# Patient Record
Sex: MALE | Race: WHITE | NOT HISPANIC OR LATINO | Employment: FULL TIME | URBAN - METROPOLITAN AREA
[De-identification: names, ages, dates, MRNs, and addresses within clinical notes are randomized per-mention and may not be internally consistent; named-entity substitution may affect disease eponyms.]

---

## 2019-05-19 ENCOUNTER — HISTORICAL (OUTPATIENT)
Dept: LAB | Facility: HOSPITAL | Age: 29
End: 2019-05-19

## 2023-01-26 ENCOUNTER — HOSPITAL ENCOUNTER (OUTPATIENT)
Dept: RADIOLOGY | Facility: HOSPITAL | Age: 33
Discharge: HOME | End: 2023-01-26
Attending: NURSE PRACTITIONER
Payer: COMMERCIAL

## 2023-01-26 ENCOUNTER — OFFICE VISIT (OUTPATIENT)
Dept: URGENT CARE | Facility: CLINIC | Age: 33
End: 2023-01-26
Payer: COMMERCIAL

## 2023-01-26 VITALS
WEIGHT: 204 LBS | HEART RATE: 74 BPM | TEMPERATURE: 98 F | DIASTOLIC BLOOD PRESSURE: 74 MMHG | SYSTOLIC BLOOD PRESSURE: 145 MMHG | OXYGEN SATURATION: 95 % | HEIGHT: 70 IN | BODY MASS INDEX: 29.2 KG/M2 | RESPIRATION RATE: 18 BRPM

## 2023-01-26 DIAGNOSIS — R20.2 PARESTHESIA OF BOTH HANDS: Primary | ICD-10-CM

## 2023-01-26 PROBLEM — F11.10 OPIOID ABUSE: Status: ACTIVE | Noted: 2023-01-26

## 2023-01-26 PROCEDURE — 99214 PR OFFICE/OUTPT VISIT, EST, LEVL IV, 30-39 MIN: ICD-10-PCS | Mod: S$PBB,,, | Performed by: NURSE PRACTITIONER

## 2023-01-26 PROCEDURE — 72040 X-RAY EXAM NECK SPINE 2-3 VW: CPT | Mod: TC

## 2023-01-26 PROCEDURE — 72070 X-RAY EXAM THORAC SPINE 2VWS: CPT | Mod: TC

## 2023-01-26 PROCEDURE — 99214 OFFICE O/P EST MOD 30 MIN: CPT | Mod: S$PBB,,, | Performed by: NURSE PRACTITIONER

## 2023-01-26 PROCEDURE — 99215 OFFICE O/P EST HI 40 MIN: CPT | Mod: PBBFAC | Performed by: NURSE PRACTITIONER

## 2023-01-26 RX ORDER — METHOCARBAMOL 500 MG/1
1000 TABLET, FILM COATED ORAL 4 TIMES DAILY
Qty: 56 TABLET | Refills: 0 | Status: SHIPPED | OUTPATIENT
Start: 2023-01-26 | End: 2023-02-02

## 2023-01-26 RX ORDER — METHYLPREDNISOLONE 4 MG/1
TABLET ORAL
Qty: 21 EACH | Refills: 0 | Status: SHIPPED | OUTPATIENT
Start: 2023-01-27 | End: 2023-02-16

## 2023-01-26 RX ORDER — DICLOFENAC SODIUM 75 MG/1
75 TABLET, DELAYED RELEASE ORAL 2 TIMES DAILY
Qty: 28 TABLET | Refills: 0 | Status: SHIPPED | OUTPATIENT
Start: 2023-01-26 | End: 2023-02-09

## 2023-01-26 NOTE — PATIENT INSTRUCTIONS
You have been referred to Dr. Palacio at [facility].  He is a hand and wrist specialist.  His office staff will call to give you an appointment, please answer any 337 area code numbers that you don't know in the next week.     - You got a steroid shot in the clinic today. This helps with inflammation and swelling.  - Please see a chiropractor: I recommend Wolf Torrez at [facility] in [place].   He accepts [facility] insurance.   Please have your XRAY results ready to show him when you go for an appointment.  - Wear your cock-up splints every night when you sleep  - Take diclofenac every day for the next 14 days, WITH food, never on an empty stomach, whether you have pain or not.    -Please use the prescriptions that were sent for you.   For the next 14 days do not take:  Ibuprofen  Naproxen  Advil  Aleve  Motrin  Ketorolac   Diclofenac  Meloxicam    It is okay to take Tylenol.  - I also sent you a non-habit forming muscle relaxer to try at night. 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     none

## 2023-01-26 NOTE — PROGRESS NOTES
"Subjective:       Patient ID: Elsie Ahuja is a 32 y.o. male.    Vitals:  height is 5' 10" (1.778 m) and weight is 92.5 kg (204 lb). His oral temperature is 98.2 °F (36.8 °C). His blood pressure is 145/74 (abnormal) and his pulse is 74. His respiration is 18 and oxygen saturation is 95%.     Chief Complaint: Numbness (Patient presents to [facility] with c/o numbness, tingling, unable to hold (fork, spoon) this morning. Patient states symptoms are on going for two weeks. )    HPI as stated in CC. Works offshore as /. Repetitive motion. No specific neck/back injury. Left rhomboid pain. Lifts weights. Inner upper left arm pain. Hands tingling, pins/needles, numb, worse at night.   ROS    Objective:      Physical Exam   Constitutional: He is oriented to person, place, and time.  Non-toxic appearance. He does not appear ill. No distress. normal  Cardiovascular:   Pulses:       Radial pulses are 2+ on the right side and 2+ on the left side.      Comments: Ulnar pulses palpable 1+ bilaterally   Pulmonary/Chest: Effort normal.   Musculoskeletal:         General: Tenderness present. No swelling, deformity or signs of injury.      Cervical back: He exhibits no tenderness.      Right hand: He exhibits decreased range of motion. Decreased strength noted. Motor /Testing: Wrist Extension: 4/5. Wrist Flexion: 5/5. : 4/5. Atrophy: There is no right thenar atrophy. There is no right hypothenar atrophy. Testing: Phalen's sign at right wrist. Tinel's sign at right wrist.      Left hand: He exhibits decreased range of motion. Decreased strength noted. Motor /Testing: Wrist Extension: 4/5. Wrist Flexion: 5/5. : 3/5. Atrophy: There is no left thenar atrophy. There is no left hypothenar atrophy. Testing: Phalen's sign at left wrist. no Tinel's sign at left wrist.   Neurological: He is alert and oriented to person, place, and time. He displays weakness and tremor. A sensory deficit is present. GCS eye subscore " is 4. GCS verbal subscore is 5. GCS motor subscore is 6.   Skin: Skin is warm and dry. Capillary refill takes 2 to 3 seconds.   Psychiatric: Mood, judgment and thought content normal.       Assessment:       1. Paresthesia of both hands        XR THORACIC SPINE AP LATERAL    Result Date: 1/26/2023  EXAMINATION: XR THORACIC SPINE AP LATERAL   CLINICAL HISTORY: Paresthesia of skin pain/numbness of bilateral hands;   COMPARISON: None.   FINDINGS: There is a slight S shaped curvature of the thoracic spine.  The vertebral heights and disc spaces are maintained.  The soft tissues are unremarkable.     1. No acute abnormality identified.   2. Slight S shaped curvature of the thoracic spine.   Electronically signed by: oRmy Moran   Date:    01/26/2023   Time:    10:50    XR Cervical Spine 2 or 3 Views    Result Date: 1/26/2023  EXAMINATION: XR CERVICAL SPINE 2 OR 3 VIEWS   CLINICAL HISTORY: Paresthesia of skin pain/numbness of bilateral hands; COMPARISON: None.   FINDINGS: Cervical alignment is normal.  The vertebral body heights and disc spaces are maintained.  The soft tissues are unremarkable.     No acute abnormality identified.   Electronically signed by: Romy Moran   Date:    01/26/2023   Time:    10:49     Plan:         Paresthesia of both hands  -     Ambulatory referral/consult to Orthopedics  -     XR Cervical Spine 2 or 3 Views  -     XR THORACIC SPINE AP LATERAL    Other orders  -     methylPREDNISolone sod suc(PF) injection 125 mg  -     methylPREDNISolone (MEDROL DOSEPACK) 4 mg tablet; use as directed  Dispense: 21 each; Refill: 0  -     diclofenac (VOLTAREN) 75 MG EC tablet; Take 1 tablet (75 mg total) by mouth 2 (two) times daily. With food. Do not combine with ibuprofen, naproxen or other NSAIDs. for 14 days  Dispense: 28 tablet; Refill: 0  -     methocarbamoL (ROBAXIN) 500 MG Tab; Take 2 tablets (1,000 mg total) by mouth 4 (four) times daily. May cause drowsiness or muscle uncoordination, do  not take before driving, working, drinking alcohol. for 7 days  Dispense: 56 tablet; Refill: 0       You have been referred to Dr. Palacio at [facility].  He is a hand and wrist specialist.  His office staff will call to give you an appointment, please answer any 337 area code numbers that you don't know in the next week.     - You got a steroid shot in the clinic today. This helps with inflammation and swelling.  - Please see a chiropractor: I recommend Wolf Torrez at [facility] in [place].   He accepts [employer] insurance.   Please have your XRAY results ready to show him when you go for an appointment.  - Wear your cock-up splints every night when you sleep  - Take diclofenac every day for the next 14 days, WITH food, never on an empty stomach, whether you have pain or not.    -Please use the prescriptions that were sent for you.   For the next 14 days do not take:  Ibuprofen  Naproxen  Advil  Aleve  Motrin  Ketorolac   Diclofenac  Meloxicam    It is okay to take Tylenol.  - I also sent you a non-habit forming muscle relaxer to try at night.

## 2023-03-06 ENCOUNTER — LAB VISIT (OUTPATIENT)
Dept: LAB | Facility: HOSPITAL | Age: 33
End: 2023-03-06
Attending: FAMILY MEDICINE
Payer: COMMERCIAL

## 2023-03-06 DIAGNOSIS — Z13.21 SCREENING FOR MALNUTRITION: ICD-10-CM

## 2023-03-06 DIAGNOSIS — E34.8 OTHER SPECIFIED ENDOCRINE DISORDERS: ICD-10-CM

## 2023-03-06 DIAGNOSIS — E55.9 AVITAMINOSIS D: ICD-10-CM

## 2023-03-06 DIAGNOSIS — I51.9 MYXEDEMA HEART DISEASE: ICD-10-CM

## 2023-03-06 DIAGNOSIS — E03.9 MYXEDEMA HEART DISEASE: ICD-10-CM

## 2023-03-06 DIAGNOSIS — R68.89 MECHANICAL AND MOTOR PROBLEMS WITH INTERNAL ORGANS: ICD-10-CM

## 2023-03-06 DIAGNOSIS — E29.1 3-OXO-5 ALPHA-STEROID DELTA 4-DEHYDROGENASE DEFICIENCY: Primary | ICD-10-CM

## 2023-03-06 LAB
ALBUMIN SERPL-MCNC: 4.1 G/DL (ref 3.5–5)
ALBUMIN/GLOB SERPL: 1.3 RATIO (ref 1.1–2)
ALP SERPL-CCNC: 83 UNIT/L (ref 40–150)
ALT SERPL-CCNC: 79 UNIT/L (ref 0–55)
AST SERPL-CCNC: 53 UNIT/L (ref 5–34)
BASOPHILS # BLD AUTO: 0.04 X10(3)/MCL (ref 0–0.2)
BASOPHILS NFR BLD AUTO: 1 %
BILIRUBIN DIRECT+TOT PNL SERPL-MCNC: 0.7 MG/DL
BUN SERPL-MCNC: 18.2 MG/DL (ref 8.9–20.6)
CALCIUM SERPL-MCNC: 9.7 MG/DL (ref 8.4–10.2)
CHLORIDE SERPL-SCNC: 105 MMOL/L (ref 98–107)
CO2 SERPL-SCNC: 26 MMOL/L (ref 22–29)
CREAT SERPL-MCNC: 1.11 MG/DL (ref 0.73–1.18)
DEPRECATED CALCIDIOL+CALCIFEROL SERPL-MC: 34.6 NG/ML (ref 30–80)
EOSINOPHIL # BLD AUTO: 0.14 X10(3)/MCL (ref 0–0.9)
EOSINOPHIL NFR BLD AUTO: 3.3 %
ERYTHROCYTE [DISTWIDTH] IN BLOOD BY AUTOMATED COUNT: 13.1 % (ref 11.5–17)
ESTRADIOL SERPL HS-MCNC: <24 PG/ML
FSH SERPL-ACNC: 1.27 MIU/ML
FT4I SERPL CALC-MCNC: 2.65 (ref 2.6–3.6)
GFR SERPLBLD CREATININE-BSD FMLA CKD-EPI: >60 MLS/MIN/1.73/M2
GLOBULIN SER-MCNC: 3.2 GM/DL (ref 2.4–3.5)
GLUCOSE SERPL-MCNC: 93 MG/DL (ref 74–100)
HCT VFR BLD AUTO: 48.8 % (ref 42–52)
HGB BLD-MCNC: 15.9 G/DL (ref 14–18)
IMM GRANULOCYTES # BLD AUTO: 0.04 X10(3)/MCL (ref 0–0.04)
IMM GRANULOCYTES NFR BLD AUTO: 1 %
LH SERPL-ACNC: 0.71 MIU/ML
LYMPHOCYTES # BLD AUTO: 1.34 X10(3)/MCL (ref 0.6–4.6)
LYMPHOCYTES NFR BLD AUTO: 31.9 %
MCH RBC QN AUTO: 29.4 PG
MCHC RBC AUTO-ENTMCNC: 32.6 G/DL (ref 33–36)
MCV RBC AUTO: 90.4 FL (ref 80–94)
MONOCYTES # BLD AUTO: 0.42 X10(3)/MCL (ref 0.1–1.3)
MONOCYTES NFR BLD AUTO: 10 %
NEUTROPHILS # BLD AUTO: 2.22 X10(3)/MCL (ref 2.1–9.2)
NEUTROPHILS NFR BLD AUTO: 52.8 %
NRBC BLD AUTO-RTO: 0 %
PLATELET # BLD AUTO: 234 X10(3)/MCL (ref 130–400)
PMV BLD AUTO: 10.2 FL (ref 7.4–10.4)
POTASSIUM SERPL-SCNC: 4 MMOL/L (ref 3.5–5.1)
PROLACTIN LEVEL (OHS): <0.82 NG/ML (ref 3.46–19.4)
PROT SERPL-MCNC: 7.3 GM/DL (ref 6.4–8.3)
RBC # BLD AUTO: 5.4 X10(6)/MCL (ref 4.7–6.1)
SODIUM SERPL-SCNC: 141 MMOL/L (ref 136–145)
T3FREE SERPL-MCNC: 3.65 PG/ML (ref 1.57–3.91)
T3RU NFR SERPL: 34.44 % (ref 31–39)
T3RU NFR SERPL: 34.44 % (ref 31–39)
T4 SERPL-MCNC: 7.69 UG/DL (ref 4.87–11.72)
T4 SERPL-MCNC: 7.69 UG/DL (ref 4.87–11.72)
TSH SERPL-ACNC: 1.35 UIU/ML (ref 0.35–4.94)
WBC # SPEC AUTO: 4.2 X10(3)/MCL (ref 4.5–11.5)

## 2023-03-06 PROCEDURE — 84402 ASSAY OF FREE TESTOSTERONE: CPT

## 2023-03-06 PROCEDURE — 83001 ASSAY OF GONADOTROPIN (FSH): CPT

## 2023-03-06 PROCEDURE — 82670 ASSAY OF TOTAL ESTRADIOL: CPT

## 2023-03-06 PROCEDURE — 83002 ASSAY OF GONADOTROPIN (LH): CPT

## 2023-03-06 PROCEDURE — 84436 ASSAY OF TOTAL THYROXINE: CPT

## 2023-03-06 PROCEDURE — 84305 ASSAY OF SOMATOMEDIN: CPT | Mod: 90

## 2023-03-06 PROCEDURE — 82306 VITAMIN D 25 HYDROXY: CPT

## 2023-03-06 PROCEDURE — 80053 COMPREHEN METABOLIC PANEL: CPT

## 2023-03-06 PROCEDURE — 84479 ASSAY OF THYROID (T3 OR T4): CPT

## 2023-03-06 PROCEDURE — 36415 COLL VENOUS BLD VENIPUNCTURE: CPT

## 2023-03-06 PROCEDURE — 84481 FREE ASSAY (FT-3): CPT

## 2023-03-06 PROCEDURE — 85025 COMPLETE CBC W/AUTO DIFF WBC: CPT

## 2023-03-06 PROCEDURE — 84443 ASSAY THYROID STIM HORMONE: CPT

## 2023-03-06 PROCEDURE — 84146 ASSAY OF PROLACTIN: CPT

## 2023-03-07 ENCOUNTER — OFFICE VISIT (OUTPATIENT)
Dept: FAMILY MEDICINE | Facility: CLINIC | Age: 33
End: 2023-03-07
Payer: COMMERCIAL

## 2023-03-07 VITALS
DIASTOLIC BLOOD PRESSURE: 82 MMHG | WEIGHT: 203.81 LBS | SYSTOLIC BLOOD PRESSURE: 126 MMHG | OXYGEN SATURATION: 98 % | RESPIRATION RATE: 16 BRPM | HEART RATE: 70 BPM | HEIGHT: 70 IN | BODY MASS INDEX: 29.18 KG/M2 | TEMPERATURE: 99 F

## 2023-03-07 DIAGNOSIS — B35.1 ONYCHOMYCOSIS: ICD-10-CM

## 2023-03-07 DIAGNOSIS — F90.9 ATTENTION DEFICIT HYPERACTIVITY DISORDER (ADHD), UNSPECIFIED ADHD TYPE: ICD-10-CM

## 2023-03-07 DIAGNOSIS — Z83.3 FAMILY HISTORY OF DIABETES MELLITUS: ICD-10-CM

## 2023-03-07 DIAGNOSIS — Z00.00 WELLNESS EXAMINATION: Primary | ICD-10-CM

## 2023-03-07 DIAGNOSIS — K21.9 GASTROESOPHAGEAL REFLUX DISEASE, UNSPECIFIED WHETHER ESOPHAGITIS PRESENT: ICD-10-CM

## 2023-03-07 DIAGNOSIS — F41.9 ANXIETY: ICD-10-CM

## 2023-03-07 DIAGNOSIS — F17.210 CIGARETTE SMOKER: ICD-10-CM

## 2023-03-07 PROCEDURE — 1159F MED LIST DOCD IN RCRD: CPT | Mod: CPTII,,, | Performed by: FAMILY MEDICINE

## 2023-03-07 PROCEDURE — 3079F DIAST BP 80-89 MM HG: CPT | Mod: CPTII,,, | Performed by: FAMILY MEDICINE

## 2023-03-07 PROCEDURE — 3008F BODY MASS INDEX DOCD: CPT | Mod: CPTII,,, | Performed by: FAMILY MEDICINE

## 2023-03-07 PROCEDURE — 99395 PR PREVENTIVE VISIT,EST,18-39: ICD-10-PCS | Mod: ,,, | Performed by: FAMILY MEDICINE

## 2023-03-07 PROCEDURE — 1160F PR REVIEW ALL MEDS BY PRESCRIBER/CLIN PHARMACIST DOCUMENTED: ICD-10-PCS | Mod: CPTII,,, | Performed by: FAMILY MEDICINE

## 2023-03-07 PROCEDURE — 3074F SYST BP LT 130 MM HG: CPT | Mod: CPTII,,, | Performed by: FAMILY MEDICINE

## 2023-03-07 PROCEDURE — 3074F PR MOST RECENT SYSTOLIC BLOOD PRESSURE < 130 MM HG: ICD-10-PCS | Mod: CPTII,,, | Performed by: FAMILY MEDICINE

## 2023-03-07 PROCEDURE — 99395 PREV VISIT EST AGE 18-39: CPT | Mod: ,,, | Performed by: FAMILY MEDICINE

## 2023-03-07 PROCEDURE — 1160F RVW MEDS BY RX/DR IN RCRD: CPT | Mod: CPTII,,, | Performed by: FAMILY MEDICINE

## 2023-03-07 PROCEDURE — 3008F PR BODY MASS INDEX (BMI) DOCUMENTED: ICD-10-PCS | Mod: CPTII,,, | Performed by: FAMILY MEDICINE

## 2023-03-07 PROCEDURE — 3079F PR MOST RECENT DIASTOLIC BLOOD PRESSURE 80-89 MM HG: ICD-10-PCS | Mod: CPTII,,, | Performed by: FAMILY MEDICINE

## 2023-03-07 PROCEDURE — 1159F PR MEDICATION LIST DOCUMENTED IN MEDICAL RECORD: ICD-10-PCS | Mod: CPTII,,, | Performed by: FAMILY MEDICINE

## 2023-03-07 RX ORDER — TERBINAFINE HYDROCHLORIDE 250 MG/1
250 TABLET ORAL DAILY
Qty: 42 TABLET | Refills: 1 | Status: SHIPPED | OUTPATIENT
Start: 2023-03-07 | End: 2023-03-08

## 2023-03-07 RX ORDER — CABERGOLINE 0.5 MG/1
0.5 TABLET ORAL
COMMUNITY
Start: 2023-03-03

## 2023-03-07 RX ORDER — TADALAFIL 20 MG/1
20 TABLET ORAL DAILY PRN
COMMUNITY
Start: 2023-03-03

## 2023-03-07 RX ORDER — LISDEXAMFETAMINE DIMESYLATE 40 MG/1
40 CAPSULE ORAL EVERY MORNING
COMMUNITY
Start: 2023-01-30

## 2023-03-07 RX ORDER — ANASTROZOLE 1 MG/1
1 TABLET ORAL
COMMUNITY
Start: 2023-03-03

## 2023-03-07 RX ORDER — BUSPIRONE HYDROCHLORIDE 5 MG/1
TABLET ORAL
COMMUNITY
Start: 2023-02-28

## 2023-03-07 RX ORDER — PANTOPRAZOLE SODIUM 40 MG/1
40 TABLET, DELAYED RELEASE ORAL DAILY
Qty: 30 TABLET | Refills: 11 | Status: SHIPPED | OUTPATIENT
Start: 2023-03-07 | End: 2024-03-06

## 2023-03-07 RX ORDER — ALBUTEROL SULFATE 90 UG/1
AEROSOL, METERED RESPIRATORY (INHALATION)
COMMUNITY
Start: 2022-10-23 | End: 2023-03-07

## 2023-03-07 RX ORDER — TAMOXIFEN CITRATE 20 MG/1
20 TABLET ORAL
COMMUNITY
Start: 2023-03-03

## 2023-03-07 RX ORDER — AMOXICILLIN 875 MG/1
TABLET, FILM COATED ORAL
COMMUNITY
Start: 2023-03-02 | End: 2023-07-10

## 2023-03-07 NOTE — PROGRESS NOTES
"Subjective:        Patient ID: Elsie Ahuja is a 32 y.o. male.    Chief Complaint: Follow-up (No PCP /Sees Dr. Momin for hormones/And Dr. Gay for psych /Sees Dr. Arita for carpal tunnel )      Patient presents to clinic unaccompanied to establish care.  He would like to do a wellness visit today    C/o gerd.  Has tried prilosec and zegerid.     Having some dental work.  On amoxicillin preventatively.  Has one day left.    He has anxiety and adhd.  Is on buspar and vyvanse.  Follows with psych, dr. Christy gay.  Sees her q3 months.      He follows with dr. Momin for hormones.      He has carpal tunnel bilaterally and sees dr. Palacio. Has appt with him 3/13/23.  Wearing wrist splints at night.     He is not allergic to any medications. He smokes cigarettes.      Has family history of diabetes in both parents. No family history of colon cancer.     Review of Systems   Constitutional: Negative.    HENT: Negative.     Eyes: Negative.    Respiratory: Negative.     Cardiovascular: Negative.    Gastrointestinal:         Gerd   Endocrine: Negative.    Genitourinary: Negative.    Musculoskeletal: Negative.    Skin:         Toenail fungus   Allergic/Immunologic: Negative.    Neurological: Negative.    Hematological: Negative.    Psychiatric/Behavioral: Negative.     All other systems reviewed and are negative.      Review of patient's allergies indicates:  No Known Allergies   Vitals:    03/07/23 1504   BP: 126/82   BP Location: Left arm   Pulse: 70   Resp: 16   Temp: 99.3 °F (37.4 °C)   TempSrc: Temporal   SpO2: 98%   Weight: 92.4 kg (203 lb 12.8 oz)   Height: 5' 10" (1.778 m)      Social History     Socioeconomic History    Marital status:    Tobacco Use    Smoking status: Some Days     Packs/day: 0.00     Types: Cigarettes    Smokeless tobacco: Never    Tobacco comments:     Smokes socially. Less than 1 pack in 6 months    Substance and Sexual Activity    Alcohol use: Not Currently    Drug use: Never    " Sexual activity: Yes     Partners: Female     Birth control/protection: I.U.D.      Family History   Problem Relation Age of Onset    Arthritis Mother     Depression Mother     Hyperlipidemia Mother     Hypertension Mother     Arthritis Father     Diabetes Father     Hearing loss Father     Hyperlipidemia Father     Hypertension Father           Objective:     Physical Exam  Vitals and nursing note reviewed.   Constitutional:       Appearance: Normal appearance. He is normal weight.   HENT:      Head: Normocephalic.      Nose: Nose normal.      Mouth/Throat:      Mouth: Mucous membranes are moist.      Pharynx: Oropharynx is clear.   Eyes:      Extraocular Movements: Extraocular movements intact.   Cardiovascular:      Rate and Rhythm: Normal rate and regular rhythm.   Pulmonary:      Effort: Pulmonary effort is normal.      Breath sounds: Normal breath sounds.   Musculoskeletal:         General: Normal range of motion.        Feet:    Feet:      Left foot:      Toenail Condition: Fungal disease present.     Comments: Left great toenail with fungal disease  Skin:     General: Skin is warm and dry.   Neurological:      General: No focal deficit present.      Mental Status: He is alert and oriented to person, place, and time. Mental status is at baseline.   Psychiatric:         Mood and Affect: Mood normal.     Current Outpatient Medications on File Prior to Visit   Medication Sig Dispense Refill    amoxicillin (AMOXIL) 875 MG tablet       anastrozole (ARIMIDEX) 1 mg Tab Take 1 mg by mouth 3 (three) times a week.      busPIRone (BUSPAR) 5 MG Tab take one tablet by mouth two times daily for anxiety      cabergoline (DOSTINEX) 0.5 mg tablet Take 0.5 mg by mouth every 7 days.      tadalafiL (CIALIS) 20 MG Tab Take 20 mg by mouth daily as needed.      tamoxifen (NOLVADEX) 20 MG Tab Take 20 mg by mouth.      VYVANSE 40 mg Cap Take 40 mg by mouth every morning.      [DISCONTINUED] albuterol (PROVENTIL/VENTOLIN HFA) 90  mcg/actuation inhaler        No current facility-administered medications on file prior to visit.     Health Maintenance   Topic Date Due    Hepatitis C Screening  Never done    Lipid Panel  Never done    TETANUS VACCINE  02/02/2033      Results for orders placed or performed in visit on 03/06/23   Comprehensive Metabolic Panel   Result Value Ref Range    Sodium Level 141 136 - 145 mmol/L    Potassium Level 4.0 3.5 - 5.1 mmol/L    Chloride 105 98 - 107 mmol/L    Carbon Dioxide 26 22 - 29 mmol/L    Glucose Level 93 74 - 100 mg/dL    Blood Urea Nitrogen 18.2 8.9 - 20.6 mg/dL    Creatinine 1.11 0.73 - 1.18 mg/dL    Calcium Level Total 9.7 8.4 - 10.2 mg/dL    Protein Total 7.3 6.4 - 8.3 gm/dL    Albumin Level 4.1 3.5 - 5.0 g/dL    Globulin 3.2 2.4 - 3.5 gm/dL    Albumin/Globulin Ratio 1.3 1.1 - 2.0 ratio    Bilirubin Total 0.7 <=1.5 mg/dL    Alkaline Phosphatase 83 40 - 150 unit/L    Alanine Aminotransferase 79 (H) 0 - 55 unit/L    Aspartate Aminotransferase 53 (H) 5 - 34 unit/L    eGFR >60 mls/min/1.73/m2   Estradiol   Result Value Ref Range    Estradiol Level <24 pg/mL   Follicle Stimulating Hormone   Result Value Ref Range    Follicle Stimulating Hormone 1.27 mIU/mL   Prolactin   Result Value Ref Range    Prolactin Level <0.82 (L) 3.46 - 19.40 ng/mL   Vitamin D   Result Value Ref Range    Vit D 25 OH 34.6 30.0 - 80.0 ng/mL   T3, Free (OLG)   Result Value Ref Range    T3 Free 3.65 1.57 - 3.91 pg/mL   Luteinizing Hormone   Result Value Ref Range    Luteinizing Hormone 0.71 mIU/mL   T3, Uptake   Result Value Ref Range    T3 Uptake 34.44 31.00 - 39.00 %   T4 (In-House)   Result Value Ref Range    Thyroxine 7.69 4.87 - 11.72 ug/dL   TSH   Result Value Ref Range    Thyroid Stimulating Hormone 1.348 0.350 - 4.940 uIU/mL   CBC with Differential   Result Value Ref Range    WBC 4.2 (L) 4.5 - 11.5 x10(3)/mcL    RBC 5.40 4.70 - 6.10 x10(6)/mcL    Hgb 15.9 14.0 - 18.0 g/dL    Hct 48.8 42.0 - 52.0 %    MCV 90.4 80.0 - 94.0 fL     MCH 29.4 pg    MCHC 32.6 (L) 33.0 - 36.0 g/dL    RDW 13.1 11.5 - 17.0 %    Platelet 234 130 - 400 x10(3)/mcL    MPV 10.2 7.4 - 10.4 fL    Neut % 52.8 %    Lymph % 31.9 %    Mono % 10.0 %    Eos % 3.3 %    Basophil % 1.0 %    Lymph # 1.34 0.6 - 4.6 x10(3)/mcL    Neut # 2.22 2.1 - 9.2 x10(3)/mcL    Mono # 0.42 0.1 - 1.3 x10(3)/mcL    Eos # 0.14 0 - 0.9 x10(3)/mcL    Baso # 0.04 0 - 0.2 x10(3)/mcL    IG# 0.04 0 - 0.04 x10(3)/mcL    IG% 1.0 %    NRBC% 0.0 %   T7   Result Value Ref Range    T3 Uptake 34.4400 31.00 - 39.00 %    Thyroxine 7.69 4.87 - 11.72 ug/dL    Free Thyroxine Index 2.65 2.60 - 3.60          Assessment & Plan:     Active Problem List with Overview Notes    Diagnosis Date Noted    Cigarette smoker 03/07/2023    Wellness examination 03/07/2023    Family history of diabetes mellitus 03/07/2023    Onychomycosis 03/07/2023    Anxiety     ADHD (attention deficit hyperactivity disorder)     Opioid abuse 01/26/2023       1. Wellness examination  Assessment & Plan:  Labs today. Will call with results when available  Denies family history of colon cancer  Declines immunizations today    Orders:  -     Cancel: CBC Auto Differential; Future; Expected date: 03/07/2024  -     Cancel: Comprehensive Metabolic Panel; Future; Expected date: 03/07/2024  -     Cancel: Lipid Panel; Future; Expected date: 03/07/2024  -     Cancel: TSH; Future; Expected date: 03/07/2024  -     Cancel: Hepatitis C Antibody; Future; Expected date: 03/07/2024  -     Cancel: HIV 1/2 Ag/Ab (4th Gen); Future; Expected date: 03/07/2024  -     Lipid Panel; Future; Expected date: 03/07/2023  -     Hemoglobin A1C; Future; Expected date: 03/07/2023  -     Hepatitis C Antibody; Future; Expected date: 03/07/2023  -     HIV 1/2 Ag/Ab (4th Gen); Future; Expected date: 03/07/2023    2. Anxiety  Assessment & Plan:  Stable on buspar. Keep appts with psych    Orders:  -     Cancel: CBC Auto Differential; Future; Expected date: 03/07/2024  -     Cancel:  Comprehensive Metabolic Panel; Future; Expected date: 03/07/2024  -     Cancel: Lipid Panel; Future; Expected date: 03/07/2024  -     Cancel: TSH; Future; Expected date: 03/07/2024  -     Cancel: Hepatitis C Antibody; Future; Expected date: 03/07/2024  -     Cancel: HIV 1/2 Ag/Ab (4th Gen); Future; Expected date: 03/07/2024  -     Lipid Panel; Future; Expected date: 03/07/2023  -     Hemoglobin A1C; Future; Expected date: 03/07/2023  -     Hepatitis C Antibody; Future; Expected date: 03/07/2023  -     HIV 1/2 Ag/Ab (4th Gen); Future; Expected date: 03/07/2023    3. Attention deficit hyperactivity disorder (ADHD), unspecified ADHD type  Assessment & Plan:  On vyvanse.  Keep appts with psych    Orders:  -     Cancel: CBC Auto Differential; Future; Expected date: 03/07/2024  -     Cancel: Comprehensive Metabolic Panel; Future; Expected date: 03/07/2024  -     Cancel: Lipid Panel; Future; Expected date: 03/07/2024  -     Cancel: TSH; Future; Expected date: 03/07/2024  -     Cancel: Hepatitis C Antibody; Future; Expected date: 03/07/2024  -     Cancel: HIV 1/2 Ag/Ab (4th Gen); Future; Expected date: 03/07/2024  -     Lipid Panel; Future; Expected date: 03/07/2023  -     Hemoglobin A1C; Future; Expected date: 03/07/2023  -     Hepatitis C Antibody; Future; Expected date: 03/07/2023  -     HIV 1/2 Ag/Ab (4th Gen); Future; Expected date: 03/07/2023    4. Cigarette smoker  Assessment & Plan:  Referral placed    Orders:  -     Ambulatory referral/consult to Smoking Cessation Program; Future; Expected date: 03/14/2023  -     Cancel: CBC Auto Differential; Future; Expected date: 03/07/2024  -     Cancel: Comprehensive Metabolic Panel; Future; Expected date: 03/07/2024  -     Cancel: Lipid Panel; Future; Expected date: 03/07/2024  -     Cancel: TSH; Future; Expected date: 03/07/2024  -     Cancel: Hepatitis C Antibody; Future; Expected date: 03/07/2024  -     Cancel: HIV 1/2 Ag/Ab (4th Gen); Future; Expected date: 03/07/2024  -      Lipid Panel; Future; Expected date: 03/07/2023  -     Hemoglobin A1C; Future; Expected date: 03/07/2023  -     Hepatitis C Antibody; Future; Expected date: 03/07/2023  -     HIV 1/2 Ag/Ab (4th Gen); Future; Expected date: 03/07/2023    5. Family history of diabetes mellitus  Assessment & Plan:  Will add a1c to labs      6. Onychomycosis  Assessment & Plan:  Declines topical soak.  Will send in terbinafine 250mg x 6 weeks.  Then he will need to get labs. Lab orders in. If wnl, will continue for 6 more weeks for total of 12 weeks. Avoid tylenol, alcohol and herbal teas. Patient is agreeable to plan and verbalized understanding    Orders:  -     Comprehensive Metabolic Panel; Future; Expected date: 04/18/2023    7. Gastroesophageal reflux disease, unspecified whether esophagitis present  -     pantoprazole (PROTONIX) 40 MG tablet; Take 1 tablet (40 mg total) by mouth once daily.  Dispense: 30 tablet; Refill: 11    Other orders  -     terbinafine HCL (LAMISIL) 250 mg tablet; Take 1 tablet (250 mg total) by mouth once daily. GO DO LABS PRIOR TO REFILL OF MEDS.  Dispense: 42 tablet; Refill: 1         Follow up in about 1 year (around 3/7/2024) for Wellness with Labs.

## 2023-03-07 NOTE — ASSESSMENT & PLAN NOTE
Declines topical soak.  Will send in terbinafine 250mg x 6 weeks.  Then he will need to get labs. Lab orders in. If wnl, will continue for 6 more weeks for total of 12 weeks. Avoid tylenol, alcohol and herbal teas. Patient is agreeable to plan and verbalized understanding

## 2023-03-07 NOTE — ASSESSMENT & PLAN NOTE
Labs today. Will call with results when available  Denies family history of colon cancer  Declines immunizations today

## 2023-03-08 ENCOUNTER — TELEPHONE (OUTPATIENT)
Dept: FAMILY MEDICINE | Facility: CLINIC | Age: 33
End: 2023-03-08
Payer: COMMERCIAL

## 2023-03-08 ENCOUNTER — LAB VISIT (OUTPATIENT)
Dept: LAB | Facility: HOSPITAL | Age: 33
End: 2023-03-08
Attending: FAMILY MEDICINE
Payer: COMMERCIAL

## 2023-03-08 DIAGNOSIS — R76.8 HEPATITIS C ANTIBODY TEST POSITIVE: Primary | ICD-10-CM

## 2023-03-08 DIAGNOSIS — F41.9 ANXIETY: ICD-10-CM

## 2023-03-08 DIAGNOSIS — Z00.00 WELLNESS EXAMINATION: ICD-10-CM

## 2023-03-08 DIAGNOSIS — F90.9 ATTENTION DEFICIT HYPERACTIVITY DISORDER (ADHD), UNSPECIFIED ADHD TYPE: ICD-10-CM

## 2023-03-08 DIAGNOSIS — B35.1 ONYCHOMYCOSIS: ICD-10-CM

## 2023-03-08 DIAGNOSIS — F17.210 CIGARETTE SMOKER: ICD-10-CM

## 2023-03-08 LAB
CHOLEST SERPL-MCNC: 140 MG/DL
CHOLEST/HDLC SERPL: 2 {RATIO} (ref 0–5)
EST. AVERAGE GLUCOSE BLD GHB EST-MCNC: 96.8 MG/DL
HBA1C MFR BLD: 5 %
HCV AB SERPL QL IA: REACTIVE
HDLC SERPL-MCNC: 57 MG/DL (ref 35–60)
HIV 1+2 AB+HIV1 P24 AG SERPL QL IA: NONREACTIVE
LDLC SERPL CALC-MCNC: 68 MG/DL (ref 50–140)
SHBG SERPL-SCNC: 64 NMOL/L
TESTOST FREE MFR SERPL: 1.2 %
TESTOST FREE SERPL-MCNC: 33 PG/ML
TESTOST SERPL-MCNC: 288 NG/DL
TESTOSTERONE.FREE+WB SERPL-MCNC: 97 NG/DL
TRIGL SERPL-MCNC: 75 MG/DL (ref 34–140)
VLDLC SERPL CALC-MCNC: 15 MG/DL

## 2023-03-08 PROCEDURE — 87389 HIV-1 AG W/HIV-1&-2 AB AG IA: CPT

## 2023-03-08 PROCEDURE — 36415 COLL VENOUS BLD VENIPUNCTURE: CPT

## 2023-03-08 PROCEDURE — 83036 HEMOGLOBIN GLYCOSYLATED A1C: CPT

## 2023-03-08 PROCEDURE — 80061 LIPID PANEL: CPT

## 2023-03-08 PROCEDURE — 86803 HEPATITIS C AB TEST: CPT

## 2023-03-08 NOTE — TELEPHONE ENCOUNTER
I spoke with Mariposa at [facility] and they asked if the cmp was to be done now. I advised and educated per Dr. Sosa's note that is was do in 6 weeks. She voiced understanding. Cecy

## 2023-03-08 NOTE — PROGRESS NOTES
Please inform patient of results.    1. His hep c antibody is positive.  Has he been tested for this in the past or treated? If not, we need to place a referral to ID to treat hep c.  I will place a referral to ID.  I would also like him to hold off on taking the terbinafine for his nail fungus. As we discussed, this med is metabolized in the liver and if he may have hep c I do not want to inflame his liver.  I will remove it from his med list.   2. Hiv is negative  3. Cholesterol and a1c are wnl    Other labwork within acceptable ranges.

## 2023-03-08 NOTE — TELEPHONE ENCOUNTER
----- Message from Aram Anand sent at 3/8/2023  8:49 AM CST -----  .Type:  Needs Medical Advice    Who Called: [facility] lab   Symptoms (please be specific):    How long has patient had these symptoms:    Pharmacy name and phone #:    Would the patient rather a call back or a response via [facility]?   Best Call Back Number: [phone]  Additional Information: She needs to speak with nurse re: some orders that she has for this patient.

## 2023-03-09 LAB
IGF-I SERPL-MCNC: 190 NG/ML (ref 54–310)
IGF-I Z-SCORE SERPL: 0.52 SD

## 2023-03-13 ENCOUNTER — HOSPITAL ENCOUNTER (OUTPATIENT)
Dept: RADIOLOGY | Facility: CLINIC | Age: 33
Discharge: HOME | End: 2023-03-13
Attending: STUDENT IN AN ORGANIZED HEALTH CARE EDUCATION/TRAINING PROGRAM
Payer: COMMERCIAL

## 2023-03-13 ENCOUNTER — OFFICE VISIT (OUTPATIENT)
Dept: ORTHOPEDICS | Facility: CLINIC | Age: 33
End: 2023-03-13
Payer: COMMERCIAL

## 2023-03-13 VITALS — HEART RATE: 94 BPM | DIASTOLIC BLOOD PRESSURE: 82 MMHG | SYSTOLIC BLOOD PRESSURE: 149 MMHG

## 2023-03-13 DIAGNOSIS — G56.23 CUBITAL TUNNEL SYNDROME, BILATERAL: ICD-10-CM

## 2023-03-13 DIAGNOSIS — M79.642 BILATERAL HAND PAIN: ICD-10-CM

## 2023-03-13 DIAGNOSIS — M79.641 BILATERAL HAND PAIN: ICD-10-CM

## 2023-03-13 DIAGNOSIS — G56.03 BILATERAL CARPAL TUNNEL SYNDROME: Primary | ICD-10-CM

## 2023-03-13 PROCEDURE — 1159F MED LIST DOCD IN RCRD: CPT | Mod: CPTII,,, | Performed by: STUDENT IN AN ORGANIZED HEALTH CARE EDUCATION/TRAINING PROGRAM

## 2023-03-13 PROCEDURE — 99203 PR OFFICE/OUTPT VISIT, NEW, LEVL III, 30-44 MIN: ICD-10-PCS | Mod: ,,, | Performed by: STUDENT IN AN ORGANIZED HEALTH CARE EDUCATION/TRAINING PROGRAM

## 2023-03-13 PROCEDURE — 3079F PR MOST RECENT DIASTOLIC BLOOD PRESSURE 80-89 MM HG: ICD-10-PCS | Mod: CPTII,,, | Performed by: STUDENT IN AN ORGANIZED HEALTH CARE EDUCATION/TRAINING PROGRAM

## 2023-03-13 PROCEDURE — 3077F SYST BP >= 140 MM HG: CPT | Mod: CPTII,,, | Performed by: STUDENT IN AN ORGANIZED HEALTH CARE EDUCATION/TRAINING PROGRAM

## 2023-03-13 PROCEDURE — 1159F PR MEDICATION LIST DOCUMENTED IN MEDICAL RECORD: ICD-10-PCS | Mod: CPTII,,, | Performed by: STUDENT IN AN ORGANIZED HEALTH CARE EDUCATION/TRAINING PROGRAM

## 2023-03-13 PROCEDURE — 3079F DIAST BP 80-89 MM HG: CPT | Mod: CPTII,,, | Performed by: STUDENT IN AN ORGANIZED HEALTH CARE EDUCATION/TRAINING PROGRAM

## 2023-03-13 PROCEDURE — 73130 X-RAY EXAM OF HAND: CPT | Mod: RT,,, | Performed by: STUDENT IN AN ORGANIZED HEALTH CARE EDUCATION/TRAINING PROGRAM

## 2023-03-13 PROCEDURE — 73130 XR HAND COMPLETE 3 VIEW RIGHT: ICD-10-PCS | Mod: RT,,, | Performed by: STUDENT IN AN ORGANIZED HEALTH CARE EDUCATION/TRAINING PROGRAM

## 2023-03-13 PROCEDURE — 73130 X-RAY EXAM OF HAND: CPT | Mod: LT,,, | Performed by: STUDENT IN AN ORGANIZED HEALTH CARE EDUCATION/TRAINING PROGRAM

## 2023-03-13 PROCEDURE — 3077F PR MOST RECENT SYSTOLIC BLOOD PRESSURE >= 140 MM HG: ICD-10-PCS | Mod: CPTII,,, | Performed by: STUDENT IN AN ORGANIZED HEALTH CARE EDUCATION/TRAINING PROGRAM

## 2023-03-13 PROCEDURE — 99203 OFFICE O/P NEW LOW 30 MIN: CPT | Mod: ,,, | Performed by: STUDENT IN AN ORGANIZED HEALTH CARE EDUCATION/TRAINING PROGRAM

## 2023-03-13 NOTE — PROGRESS NOTES
Chief Complaint:  Bilateral hand numbness and tingling    Consulting Physician: Lauren Lance FNP    History of present illness:    Patient is a 32-year-old male right-hand dominant who works off shore as an  presents for initial evaluation of bilateral hand numbness and tingling.  He states that he is had left worse than right hand numbness for several years but it has gotten worse over the past few months.  In the past when it 1st started he would affect him only at nighttime but has now progressed to throughout the day.  He is states that his hands are completely numb.  Does not have any neck pain.  Does complain of some mild pain in his left axilla but overall the pain that he feels is at the medial aspect of the elbow and at the wrist.  He states that sometimes if he twists the wrist the wrong way he feels a pop in the wrist with stinging and shocking sensations into fingertips.  He states that the thumb is the most affected finger but all 5 fingers are numb in both hands.  He sleeps with carpal tunnel braces which helps some.  He does not have significant weakness.    Past Medical History:   Diagnosis Date    ADHD (attention deficit hyperactivity disorder)     Anxiety        History reviewed. No pertinent surgical history.    Current Outpatient Medications   Medication Sig    amoxicillin (AMOXIL) 875 MG tablet     anastrozole (ARIMIDEX) 1 mg Tab Take 1 mg by mouth 3 (three) times a week.    busPIRone (BUSPAR) 5 MG Tab take one tablet by mouth two times daily for anxiety    cabergoline (DOSTINEX) 0.5 mg tablet Take 0.5 mg by mouth every 7 days.    pantoprazole (PROTONIX) 40 MG tablet Take 1 tablet (40 mg total) by mouth once daily.    tadalafiL (CIALIS) 20 MG Tab Take 20 mg by mouth daily as needed.    tamoxifen (NOLVADEX) 20 MG Tab Take 20 mg by mouth.    VYVANSE 40 mg Cap Take 40 mg by mouth every morning.     No current facility-administered medications for this visit.       Review of patient's  allergies indicates:  No Known Allergies    Family History   Problem Relation Age of Onset    Arthritis Mother     Depression Mother     Hyperlipidemia Mother     Hypertension Mother     Arthritis Father     Diabetes Father     Hearing loss Father     Hyperlipidemia Father     Hypertension Father        Social History     Socioeconomic History    Marital status:    Tobacco Use    Smoking status: Some Days     Packs/day: 0.00     Types: Cigarettes    Smokeless tobacco: Never    Tobacco comments:     Smokes socially. Less than 1 pack in 6 months    Substance and Sexual Activity    Alcohol use: Not Currently    Drug use: Never    Sexual activity: Yes     Partners: Female     Birth control/protection: I.U.D.       Review of Systems:    Constitution:   Denies chills, fever, and sweats.  HENT:   Denies headaches or blurry vision.  Cardiovascular:  Denies chest pain or irregular heart beat.  Respiratory:   Denies cough or shortness of breath.  Gastrointestinal:  Denies abdominal pain, nausea, or vomiting.  Musculoskeletal:   Denies muscle cramps.  Neurological:   Denies dizziness or focal weakness.  Psychiatric/Behavior: Normal mental status.  Hematology/Lymph:  Denies bleeding problem or easy bruising/bleeding.  Skin:    Denies rash or suspicious lesions.    Examination:    Vital Signs:    Vitals:    03/13/23 0953   BP: (!) 149/82   Pulse: 94   PainSc:   1       There is no height or weight on file to calculate BMI.    Constitution:   Well-developed, well nourished patient in no acute distress.  Neurological:   Alert and oriented x 3 and cooperative to examination.     Psychiatric/Behavior: Normal mental status.  Respiratory:   No shortness of breath.  Eyes:    Extraoccular muscles intact  Skin:    No scars, rash or suspicious lesions.    MSK:   Left upper extremity: No open wounds or rashes.  Full range of motion of the shoulder, elbow, wrist, hand.  Spurling is negative.  Tinel is positive over the ulnar nerve at  the elbow as well as the median nerve at the wrist.  Phalen's and Durkan's tests are positive.  Flexion and compression at the elbow reproduces the numbness in the ring and small finger.  Two-point discrimination is greater than 15 in the thumb index and middle fingers.  Two-point discrimination is 10 in the ring and small fingers.  Radial pulses 2+.  Apb strength is 5/5.  First dorsal interosseous strength is 5/5.  Hand is warm well perfused    Right upper extremity: No open wounds or rashes.  Full range of motion of the shoulder, elbow, wrist, hand.  Spurling is negative.  Tinel is positive over the ulnar nerve at the elbow as well as the median nerve at the wrist.  Phalen's and Durkan's tests are positive.  Flexion and compression at the elbow reproduces the numbness in the ring and small finger.  Two-point discrimination is greater than 15 in the thumb index and middle fingers.  Two-point discrimination is 10 in the ring and small fingers.  Radial pulses 2+.  Apb strength is 5/5.  First dorsal interosseous strength is 5/5.  Hand is warm well perfused    Imaging:   X-ray of the left hand shows no fractures or dislocations  X-ray of the right hand shows no fractures or dislocations     Assessment:  Bilateral carpal tunnel syndrome, bilateral cubital tunnel syndrome    Plan:  I think he has bilateral carpal tunnel and cubital tunnel syndromes.  I will send him for a EMG nerve conduction study to evaluate for bilateral carpal tunnel cubital tunnel as well as cervical radiculopathy.  I do not think he has cervical radiculopathy based on my examination.  I think he would benefit from carpal tunnel and cubital tunnel releases given how diminished sensation in the hand is.  I will see him back after the EMG nerve conduction study    Follow Up:  After EMG  Xray at next visit:  None

## 2023-04-13 ENCOUNTER — TELEPHONE (OUTPATIENT)
Dept: SMOKING CESSATION | Facility: CLINIC | Age: 33
End: 2023-04-13
Payer: COMMERCIAL

## 2023-04-13 NOTE — TELEPHONE ENCOUNTER
Pt had not shown up for his [facility].  Called pt.  No answer.  Unable to leave a voice message.  Mailbox is not setup.

## 2023-05-12 ENCOUNTER — TELEPHONE (OUTPATIENT)
Dept: ORTHOPEDICS | Facility: CLINIC | Age: 33
End: 2023-05-12
Payer: COMMERCIAL

## 2023-05-12 ENCOUNTER — TELEPHONE (OUTPATIENT)
Dept: SMOKING CESSATION | Facility: CLINIC | Age: 33
End: 2023-05-12
Payer: COMMERCIAL

## 2023-05-12 NOTE — TELEPHONE ENCOUNTER
Attempted to contact patient to rescheduled missed [facility] appointment.  Patient.  Patient did not answer.  Counselor was unable to leave a voice message.  Voicemail was not available.

## 2023-05-12 NOTE — TELEPHONE ENCOUNTER
Attempted to call patient to discuss if he did his EMG. His appointment is Monday and I do not have results. Patient did not answer and can't leave .

## 2023-05-23 ENCOUNTER — TELEPHONE (OUTPATIENT)
Dept: ORTHOPEDICS | Facility: CLINIC | Age: 33
End: 2023-05-23
Payer: COMMERCIAL

## 2023-05-23 NOTE — TELEPHONE ENCOUNTER
Attempted to call the patient to schedule an appointment for EMG results. No answer and cannot LVM.

## 2023-06-12 PROBLEM — Z00.00 WELLNESS EXAMINATION: Status: RESOLVED | Noted: 2023-03-07 | Resolved: 2023-06-12

## 2023-07-10 ENCOUNTER — LAB VISIT (OUTPATIENT)
Dept: LAB | Facility: HOSPITAL | Age: 33
End: 2023-07-10
Attending: FAMILY MEDICINE
Payer: COMMERCIAL

## 2023-07-10 ENCOUNTER — PATIENT MESSAGE (OUTPATIENT)
Dept: FAMILY MEDICINE | Facility: CLINIC | Age: 33
End: 2023-07-10

## 2023-07-10 ENCOUNTER — OFFICE VISIT (OUTPATIENT)
Dept: FAMILY MEDICINE | Facility: CLINIC | Age: 33
End: 2023-07-10
Payer: COMMERCIAL

## 2023-07-10 VITALS
OXYGEN SATURATION: 98 % | RESPIRATION RATE: 16 BRPM | HEIGHT: 70 IN | DIASTOLIC BLOOD PRESSURE: 82 MMHG | HEART RATE: 66 BPM | BODY MASS INDEX: 29.66 KG/M2 | WEIGHT: 207.19 LBS | SYSTOLIC BLOOD PRESSURE: 150 MMHG | TEMPERATURE: 99 F

## 2023-07-10 DIAGNOSIS — Z11.3 SCREEN FOR STD (SEXUALLY TRANSMITTED DISEASE): ICD-10-CM

## 2023-07-10 DIAGNOSIS — R03.0 ELEVATED BLOOD PRESSURE READING: ICD-10-CM

## 2023-07-10 DIAGNOSIS — B19.20 HEPATITIS C VIRUS INFECTION WITHOUT HEPATIC COMA, UNSPECIFIED CHRONICITY: Primary | ICD-10-CM

## 2023-07-10 DIAGNOSIS — R35.1 NOCTURIA: ICD-10-CM

## 2023-07-10 DIAGNOSIS — B19.20 HEPATITIS C VIRUS INFECTION WITHOUT HEPATIC COMA, UNSPECIFIED CHRONICITY: ICD-10-CM

## 2023-07-10 DIAGNOSIS — K43.9 HERNIA OF ABDOMINAL WALL: ICD-10-CM

## 2023-07-10 DIAGNOSIS — Z83.3 FAMILY HISTORY OF DIABETES MELLITUS: ICD-10-CM

## 2023-07-10 LAB
APPEARANCE UR: CLEAR
BILIRUB UR QL STRIP.AUTO: NEGATIVE MG/DL
COLOR UR: NORMAL
GLUCOSE UR QL STRIP.AUTO: NEGATIVE MG/DL
KETONES UR QL STRIP.AUTO: NEGATIVE MG/DL
LEUKOCYTE ESTERASE UR QL STRIP.AUTO: NEGATIVE UNIT/L
NITRITE UR QL STRIP.AUTO: NEGATIVE
PH UR STRIP.AUTO: 6 [PH]
PROT UR QL STRIP.AUTO: NEGATIVE MG/DL
RBC UR QL AUTO: NEGATIVE UNIT/L
SP GR UR STRIP.AUTO: 1.01
UROBILINOGEN UR STRIP-ACNC: 0.2 MG/DL

## 2023-07-10 PROCEDURE — 81003 URINALYSIS AUTO W/O SCOPE: CPT

## 2023-07-10 PROCEDURE — 1159F MED LIST DOCD IN RCRD: CPT | Mod: CPTII,,, | Performed by: FAMILY MEDICINE

## 2023-07-10 PROCEDURE — 3077F PR MOST RECENT SYSTOLIC BLOOD PRESSURE >= 140 MM HG: ICD-10-PCS | Mod: CPTII,,, | Performed by: FAMILY MEDICINE

## 2023-07-10 PROCEDURE — 1159F PR MEDICATION LIST DOCUMENTED IN MEDICAL RECORD: ICD-10-PCS | Mod: CPTII,,, | Performed by: FAMILY MEDICINE

## 2023-07-10 PROCEDURE — 3079F DIAST BP 80-89 MM HG: CPT | Mod: CPTII,,, | Performed by: FAMILY MEDICINE

## 2023-07-10 PROCEDURE — 1160F RVW MEDS BY RX/DR IN RCRD: CPT | Mod: CPTII,,, | Performed by: FAMILY MEDICINE

## 2023-07-10 PROCEDURE — 3008F PR BODY MASS INDEX (BMI) DOCUMENTED: ICD-10-PCS | Mod: CPTII,,, | Performed by: FAMILY MEDICINE

## 2023-07-10 PROCEDURE — 99214 OFFICE O/P EST MOD 30 MIN: CPT | Mod: ,,, | Performed by: FAMILY MEDICINE

## 2023-07-10 PROCEDURE — 3008F BODY MASS INDEX DOCD: CPT | Mod: CPTII,,, | Performed by: FAMILY MEDICINE

## 2023-07-10 PROCEDURE — 3077F SYST BP >= 140 MM HG: CPT | Mod: CPTII,,, | Performed by: FAMILY MEDICINE

## 2023-07-10 PROCEDURE — 99214 PR OFFICE/OUTPT VISIT, EST, LEVL IV, 30-39 MIN: ICD-10-PCS | Mod: ,,, | Performed by: FAMILY MEDICINE

## 2023-07-10 PROCEDURE — 1160F PR REVIEW ALL MEDS BY PRESCRIBER/CLIN PHARMACIST DOCUMENTED: ICD-10-PCS | Mod: CPTII,,, | Performed by: FAMILY MEDICINE

## 2023-07-10 PROCEDURE — 3079F PR MOST RECENT DIASTOLIC BLOOD PRESSURE 80-89 MM HG: ICD-10-PCS | Mod: CPTII,,, | Performed by: FAMILY MEDICINE

## 2023-07-10 RX ORDER — LISDEXAMFETAMINE DIMESYLATE 50 MG/1
50 CAPSULE ORAL EVERY MORNING
COMMUNITY
Start: 2023-06-03

## 2023-07-10 NOTE — ASSESSMENT & PLAN NOTE
Initial and repeat elevated. No history of htn. Not on meds.  Used nicotine gum prior to appt.  Monitor at home. Call with readings. Wife is in healthcare

## 2023-07-10 NOTE — PROGRESS NOTES
Subjective:        Patient ID: Elsie Ahuja is a 32 y.o. male.    Chief Complaint: Follow-up (Patient has c/o possible hernia. First noticed 6 weeks ago/Would like hep C follow up/Patient would also like STD testing ordered )      Patient presents to clinic unaccompanied for c/o hernia issues.  He is due for his wellness visit in march    Hep c screening was positive in 3/2023.  Was referred to ID.  States has not heard from anyone. Will place referral again today. He would like std screening ordered as well. Denies symptoms.  HIV screening 3/2023 was negative. Orders placed. Also c/o nocturia.  Used anabolic steroids in the past    He also c/o hernia symptoms x 6 weeks. Bulge above belly button. Has also noted more oval shape. No pain.  No problems with BM. He works out regularly.  States due to his job with [employer], needs to be evaluated.     His bp is elevated today. No history of htn.  Chewed nicotine gum prior to appt.            C/o gerd.  Has tried prilosec and zegerid.      He has anxiety and adhd.  Is on buspar and vyvanse.  Follows with psych, dr. Christy mejia.  Sees her q3 months.       He follows with dr. Momin for hormones.       He has carpal tunnel bilaterally and sees dr. Palacio. Has appt with him 3/13/23.  Wearing wrist splints at night.      He is not allergic to any medications. He smokes cigarettes.       Has family history of diabetes in both parents. No family history of colon cancer. Is in [employer].     Review of Systems   Constitutional: Negative.    HENT: Negative.     Eyes: Negative.    Respiratory: Negative.     Cardiovascular: Negative.    Gastrointestinal: Negative.  Negative for abdominal pain, blood in stool and constipation.   Endocrine: Negative.    Genitourinary:  Positive for frequency. Negative for genital sores, penile discharge, penile pain, penile swelling, scrotal swelling, testicular pain and urgency.   Musculoskeletal: Negative.    Skin: Negative.   "  Allergic/Immunologic: Negative.    Neurological: Negative.    Hematological: Negative.    Psychiatric/Behavioral: Negative.     All other systems reviewed and are negative.      Review of patient's allergies indicates:  No Known Allergies   Vitals:    07/10/23 1546 07/10/23 1612   BP: (!) 154/80 (!) 150/82   BP Location: Left arm    Pulse: 66    Resp: 16    Temp: 98.7 °F (37.1 °C)    TempSrc: Temporal    SpO2: 98%    Weight: 94 kg (207 lb 3.2 oz)    Height: 5' 10" (1.778 m)       Social History     Socioeconomic History    Marital status:    Tobacco Use    Smoking status: Some Days     Packs/day: 0.00     Types: Cigarettes    Smokeless tobacco: Never    Tobacco comments:     Smokes socially. Less than 1 pack in 6 months    Substance and Sexual Activity    Alcohol use: Not Currently    Drug use: Never    Sexual activity: Yes     Partners: Female     Birth control/protection: I.U.D.      Family History   Problem Relation Age of Onset    Arthritis Mother     Depression Mother     Hyperlipidemia Mother     Hypertension Mother     Arthritis Father     Diabetes Father     Hearing loss Father     Hyperlipidemia Father     Hypertension Father           Objective:     Physical Exam  Vitals and nursing note reviewed.   Constitutional:       Appearance: Normal appearance. He is normal weight.   HENT:      Head: Normocephalic.      Nose: Nose normal.      Mouth/Throat:      Mouth: Mucous membranes are moist.      Pharynx: Oropharynx is clear.   Eyes:      Extraocular Movements: Extraocular movements intact.   Cardiovascular:      Rate and Rhythm: Normal rate and regular rhythm.   Pulmonary:      Effort: Pulmonary effort is normal.      Breath sounds: Normal breath sounds.   Abdominal:      General: Bowel sounds are normal.      Tenderness: There is no abdominal tenderness.          Comments: Questionable small periumbilical hernia.  Will place referral to gen surg for eval per patient request   Musculoskeletal:         " General: Normal range of motion.   Skin:     General: Skin is warm and dry.   Neurological:      General: No focal deficit present.      Mental Status: He is alert and oriented to person, place, and time. Mental status is at baseline.   Psychiatric:         Mood and Affect: Mood normal.     Current Outpatient Medications on File Prior to Visit   Medication Sig Dispense Refill    anastrozole (ARIMIDEX) 1 mg Tab Take 1 mg by mouth 3 (three) times a week.      busPIRone (BUSPAR) 5 MG Tab take one tablet by mouth two times daily for anxiety      cabergoline (DOSTINEX) 0.5 mg tablet Take 0.5 mg by mouth every 7 days.      pantoprazole (PROTONIX) 40 MG tablet Take 1 tablet (40 mg total) by mouth once daily. 30 tablet 11    tadalafiL (CIALIS) 20 MG Tab Take 20 mg by mouth daily as needed.      tamoxifen (NOLVADEX) 20 MG Tab Take 20 mg by mouth.      VYVANSE 50 mg capsule Take 50 mg by mouth every morning.      VYVANSE 40 mg Cap Take 40 mg by mouth every morning.      [DISCONTINUED] amoxicillin (AMOXIL) 875 MG tablet        No current facility-administered medications on file prior to visit.     Health Maintenance   Topic Date Due    TETANUS VACCINE  02/02/2033    Hepatitis C Screening  Completed    Lipid Panel  Completed      Results for orders placed or performed in visit on 03/08/23   Lipid Panel   Result Value Ref Range    Cholesterol Total 140 <=200 mg/dL    HDL Cholesterol 57 35 - 60 mg/dL    Triglyceride 75 34 - 140 mg/dL    Cholesterol/HDL Ratio 2 0 - 5    Very Low Density Lipoprotein 15     LDL Cholesterol 68.00 50.00 - 140.00 mg/dL   Hemoglobin A1C   Result Value Ref Range    Hemoglobin A1c 5.0 <=7.0 %    Estimated Average Glucose 96.8 mg/dL   Hepatitis C Antibody   Result Value Ref Range    Hep C Ab Interp Reactive (A) Nonreactive   HIV 1/2 Ag/Ab (4th Gen)   Result Value Ref Range    HIV Nonreactive Nonreactive          Assessment & Plan:     Active Problem List with Overview Notes    Diagnosis Date Noted     Hepatitis C 07/10/2023    Nocturia 07/10/2023    Hernia of abdominal wall 07/10/2023    Screen for STD (sexually transmitted disease) 07/10/2023    Elevated blood pressure reading 07/10/2023    Cigarette smoker 03/07/2023    Family history of diabetes mellitus 03/07/2023    Onychomycosis 03/07/2023    Anxiety     ADHD (attention deficit hyperactivity disorder)     Opioid abuse 01/26/2023       1. Hepatitis C virus infection without hepatic coma, unspecified chronicity  Assessment & Plan:  On wellness labs 3/2023. Will place referral to ID     Orders:  -     Ambulatory referral/consult to Infectious Disease; Future; Expected date: 07/17/2023  -     Chlamydia/GC, PCR; Future; Expected date: 07/10/2023  -     SYPHILIS ANTIBODY (WITH REFLEX RPR); Future; Expected date: 07/10/2023  -     PSA, Screening; Future; Expected date: 07/10/2023  -     Cancel: Urinalysis; Future; Expected date: 07/10/2023  -     Hemoglobin A1C; Future; Expected date: 07/10/2023  -     Urinalysis; Future; Expected date: 07/10/2023    2. Screen for STD (sexually transmitted disease)  Assessment & Plan:  Denies symptoms    Rpr, urine gc/clam ordered. Will call with results when available.       Hep c +3/2023. HIV - 3/2023    Orders:  -     Chlamydia/GC, PCR; Future; Expected date: 07/10/2023  -     SYPHILIS ANTIBODY (WITH REFLEX RPR); Future; Expected date: 07/10/2023  -     PSA, Screening; Future; Expected date: 07/10/2023  -     Cancel: Urinalysis; Future; Expected date: 07/10/2023  -     Hemoglobin A1C; Future; Expected date: 07/10/2023  -     Urinalysis; Future; Expected date: 07/10/2023    3. Nocturia  Assessment & Plan:  Will get psa and ua.      Orders:  -     PSA, Screening; Future; Expected date: 07/10/2023  -     Cancel: Urinalysis; Future; Expected date: 07/10/2023  -     Hemoglobin A1C; Future; Expected date: 07/10/2023  -     Urinalysis; Future; Expected date: 07/10/2023    4. Family history of diabetes mellitus  Assessment & Plan:  Lab  Results   Component Value Date    HGBA1C 5.0 03/08/2023     Will repeat per patient request.     Orders:  -     PSA, Screening; Future; Expected date: 07/10/2023  -     Cancel: Urinalysis; Future; Expected date: 07/10/2023  -     Hemoglobin A1C; Future; Expected date: 07/10/2023  -     Urinalysis; Future; Expected date: 07/10/2023    5. Hernia of abdominal wall  Assessment & Plan:  Will place referral to gen surg for eval per patient request.      Orders:  -     Ambulatory referral/consult to General Surgery; Future; Expected date: 07/17/2023    6. Elevated blood pressure reading  Assessment & Plan:  Initial and repeat elevated. No history of htn. Not on meds.  Used nicotine gum prior to appt.  Monitor at home. Call with readings. Wife is in healthcare           Follow up for as scheduled or sooner prn.

## 2023-07-10 NOTE — ASSESSMENT & PLAN NOTE
Denies symptoms    Rpr, urine gc/clam ordered. Will call with results when available.       Hep c +3/2023. HIV - 3/2023

## 2023-07-11 ENCOUNTER — LAB VISIT (OUTPATIENT)
Dept: LAB | Facility: HOSPITAL | Age: 33
End: 2023-07-11
Attending: FAMILY MEDICINE
Payer: COMMERCIAL

## 2023-07-11 ENCOUNTER — TELEPHONE (OUTPATIENT)
Dept: FAMILY MEDICINE | Facility: CLINIC | Age: 33
End: 2023-07-11
Payer: COMMERCIAL

## 2023-07-11 DIAGNOSIS — R53.83 FATIGUE, UNSPECIFIED TYPE: ICD-10-CM

## 2023-07-11 DIAGNOSIS — Z13.21 SCREENING FOR MALNUTRITION: ICD-10-CM

## 2023-07-11 DIAGNOSIS — E07.9 DISEASE OF THYROID GLAND: ICD-10-CM

## 2023-07-11 DIAGNOSIS — E34.8 OTHER SPECIFIED ENDOCRINE DISORDERS: ICD-10-CM

## 2023-07-11 DIAGNOSIS — E29.1 3-OXO-5 ALPHA-STEROID DELTA 4-DEHYDROGENASE DEFICIENCY: Primary | ICD-10-CM

## 2023-07-11 DIAGNOSIS — R68.89 MECHANICAL AND MOTOR PROBLEMS WITH INTERNAL ORGANS: ICD-10-CM

## 2023-07-11 DIAGNOSIS — E29.8 OTHER TESTICULAR DYSFUNCTION: ICD-10-CM

## 2023-07-11 DIAGNOSIS — E55.9 VITAMIN D DEFICIENCY: ICD-10-CM

## 2023-07-11 LAB
ALBUMIN SERPL-MCNC: 3.7 G/DL (ref 3.5–5)
ALBUMIN/GLOB SERPL: 1.1 RATIO (ref 1.1–2)
ALP SERPL-CCNC: 78 UNIT/L (ref 40–150)
ALT SERPL-CCNC: 51 UNIT/L (ref 0–55)
AST SERPL-CCNC: 55 UNIT/L (ref 5–34)
BASOPHILS # BLD AUTO: 0.07 X10(3)/MCL
BASOPHILS NFR BLD AUTO: 1 %
BILIRUBIN DIRECT+TOT PNL SERPL-MCNC: 0.5 MG/DL
BUN SERPL-MCNC: 21.7 MG/DL (ref 8.9–20.6)
CALCIUM SERPL-MCNC: 9.4 MG/DL (ref 8.4–10.2)
CHLORIDE SERPL-SCNC: 101 MMOL/L (ref 98–107)
CO2 SERPL-SCNC: 26 MMOL/L (ref 22–29)
CREAT SERPL-MCNC: 1.29 MG/DL (ref 0.73–1.18)
DEPRECATED CALCIDIOL+CALCIFEROL SERPL-MC: 48.8 NG/ML (ref 30–80)
EOSINOPHIL # BLD AUTO: 0.12 X10(3)/MCL (ref 0–0.9)
EOSINOPHIL NFR BLD AUTO: 1.7 %
ERYTHROCYTE [DISTWIDTH] IN BLOOD BY AUTOMATED COUNT: 18.4 % (ref 11.5–17)
ESTRADIOL SERPL HS-MCNC: <24 PG/ML
FSH SERPL-ACNC: <0.11 MIU/ML
GFR SERPLBLD CREATININE-BSD FMLA CKD-EPI: >60 MLS/MIN/1.73/M2
GLOBULIN SER-MCNC: 3.3 GM/DL (ref 2.4–3.5)
GLUCOSE SERPL-MCNC: 127 MG/DL (ref 74–100)
HCT VFR BLD AUTO: 49.1 % (ref 42–52)
HGB BLD-MCNC: 15.8 G/DL (ref 14–18)
IMM GRANULOCYTES # BLD AUTO: 0.07 X10(3)/MCL (ref 0–0.04)
IMM GRANULOCYTES NFR BLD AUTO: 1 %
LH SERPL-ACNC: <0.12 MIU/ML
LYMPHOCYTES # BLD AUTO: 1.88 X10(3)/MCL (ref 0.6–4.6)
LYMPHOCYTES NFR BLD AUTO: 26.9 %
MCH RBC QN AUTO: 26.6 PG (ref 27–31)
MCHC RBC AUTO-ENTMCNC: 32.2 G/DL (ref 33–36)
MCV RBC AUTO: 82.5 FL (ref 80–94)
MONOCYTES # BLD AUTO: 0.57 X10(3)/MCL (ref 0.1–1.3)
MONOCYTES NFR BLD AUTO: 8.2 %
NEUTROPHILS # BLD AUTO: 4.27 X10(3)/MCL (ref 2.1–9.2)
NEUTROPHILS NFR BLD AUTO: 61.2 %
NRBC BLD AUTO-RTO: 0 %
PLATELET # BLD AUTO: 311 X10(3)/MCL (ref 130–400)
PMV BLD AUTO: 9.4 FL (ref 7.4–10.4)
POTASSIUM SERPL-SCNC: 4.1 MMOL/L (ref 3.5–5.1)
PROLACTIN LEVEL (OHS): <0.82 NG/ML (ref 3.46–19.4)
PROT SERPL-MCNC: 7 GM/DL (ref 6.4–8.3)
RBC # BLD AUTO: 5.95 X10(6)/MCL (ref 4.7–6.1)
SODIUM SERPL-SCNC: 138 MMOL/L (ref 136–145)
T3FREE SERPL-MCNC: 3.17 PG/ML (ref 1.58–3.91)
T3RU NFR SERPL: 59.85 % (ref 31–39)
T4 SERPL-MCNC: 4.35 UG/DL (ref 4.87–11.72)
TSH SERPL-ACNC: 1.11 UIU/ML (ref 0.35–4.94)
WBC # SPEC AUTO: 6.98 X10(3)/MCL (ref 4.5–11.5)

## 2023-07-11 PROCEDURE — 84443 ASSAY THYROID STIM HORMONE: CPT

## 2023-07-11 PROCEDURE — 84402 ASSAY OF FREE TESTOSTERONE: CPT

## 2023-07-11 PROCEDURE — 83001 ASSAY OF GONADOTROPIN (FSH): CPT

## 2023-07-11 PROCEDURE — 84481 FREE ASSAY (FT-3): CPT

## 2023-07-11 PROCEDURE — 84146 ASSAY OF PROLACTIN: CPT

## 2023-07-11 PROCEDURE — 36415 COLL VENOUS BLD VENIPUNCTURE: CPT

## 2023-07-11 PROCEDURE — 80053 COMPREHEN METABOLIC PANEL: CPT

## 2023-07-11 PROCEDURE — 84305 ASSAY OF SOMATOMEDIN: CPT

## 2023-07-11 PROCEDURE — 83002 ASSAY OF GONADOTROPIN (LH): CPT

## 2023-07-11 PROCEDURE — 84436 ASSAY OF TOTAL THYROXINE: CPT

## 2023-07-11 PROCEDURE — 85025 COMPLETE CBC W/AUTO DIFF WBC: CPT

## 2023-07-11 PROCEDURE — 84436 ASSAY OF TOTAL THYROXINE: CPT | Mod: 91

## 2023-07-11 PROCEDURE — 82670 ASSAY OF TOTAL ESTRADIOL: CPT

## 2023-07-11 PROCEDURE — 82306 VITAMIN D 25 HYDROXY: CPT

## 2023-07-11 PROCEDURE — 84479 ASSAY OF THYROID (T3 OR T4): CPT

## 2023-07-11 PROCEDURE — 86376 MICROSOMAL ANTIBODY EACH: CPT

## 2023-07-11 NOTE — TELEPHONE ENCOUNTER
Sure.  I will send a prescription for compounded medication to [facility].  They will call him before they fill it. Use as directed.  Rx is signed in my folder

## 2023-07-11 NOTE — TELEPHONE ENCOUNTER
----- Message from Zayda Sosa MD sent at 7/11/2023  8:02 AM CDT -----  Please inform patient of results.    1. Urine shows no infection.   2. Urine GC/Chlam is negative for both.   3. psa is normal  4. Syphilis test is negative.   5. A1c is 5.0- no dmii.      Encourage fluids. Monitor.  If he is still concerned about his nocturia, we can consider urology referral but all his labs are wnl.

## 2023-07-12 ENCOUNTER — TELEPHONE (OUTPATIENT)
Dept: FAMILY MEDICINE | Facility: CLINIC | Age: 33
End: 2023-07-12
Payer: COMMERCIAL

## 2023-07-12 DIAGNOSIS — B19.20 HEPATITIS C VIRUS INFECTION WITHOUT HEPATIC COMA, UNSPECIFIED CHRONICITY: Primary | ICD-10-CM

## 2023-07-12 NOTE — TELEPHONE ENCOUNTER
I have signed for the following orders AND/OR meds.  Please call the patient and ask the patient to schedule the testing AND/OR inform about any medications that were sent.      Orders Placed This Encounter   Procedures    Ambulatory referral/consult to Infectious Disease     Standing Status:   Future     Standing Expiration Date:   8/12/2024     Referral Priority:   Routine     Referral Type:   Consultation     Referral Reason:   Specialty Services Required     Referred to Provider:   Bruno Schaeffer MD     Requested Specialty:   Infectious Diseases     Number of Visits Requested:   1

## 2023-07-13 LAB
FT4I SERPL CALC-MCNC: 4.7 MCG/DL (ref 4.8–12.7)
SHBG SERPL-SCNC: 16 NMOL/L
T4 SERPL IA-MCNC: 4.2 MCG/DL (ref 4.5–11.7)
TESTOST FREE MFR SERPL: ABNORMAL %
TESTOST FREE SERPL-MCNC: ABNORMAL PG/ML
TESTOST SERPL-MCNC: >1500 NG/DL
TESTOSTERONE.FREE+WB SERPL-MCNC: ABNORMAL NG/DL
THYROID PEROXIDASE QUANT (OLG): <4 IU/ML
TOTAL TBC SERPL: 0.9 TBI (ref 0.8–1.3)

## 2023-07-15 LAB
IGF-I SERPL-MCNC: 168 NG/ML (ref 54–310)
IGF-I Z-SCORE SERPL: 0.25 SD

## 2023-08-12 ENCOUNTER — PATIENT MESSAGE (OUTPATIENT)
Dept: FAMILY MEDICINE | Facility: CLINIC | Age: 33
End: 2023-08-12
Payer: COMMERCIAL

## 2023-08-12 DIAGNOSIS — I10 HYPERTENSION, UNSPECIFIED TYPE: Primary | ICD-10-CM

## 2023-08-14 ENCOUNTER — TELEPHONE (OUTPATIENT)
Dept: FAMILY MEDICINE | Facility: CLINIC | Age: 33
End: 2023-08-14
Payer: COMMERCIAL

## 2023-08-14 DIAGNOSIS — I10 HYPERTENSION, UNSPECIFIED TYPE: ICD-10-CM

## 2023-08-14 RX ORDER — LISINOPRIL AND HYDROCHLOROTHIAZIDE 10; 12.5 MG/1; MG/1
1 TABLET ORAL DAILY
Qty: 30 TABLET | Refills: 11 | Status: SHIPPED | OUTPATIENT
Start: 2023-08-14 | End: 2024-08-13

## 2023-08-14 RX ORDER — LISINOPRIL AND HYDROCHLOROTHIAZIDE 10; 12.5 MG/1; MG/1
1 TABLET ORAL DAILY
Qty: 30 TABLET | Refills: 11 | Status: SHIPPED | OUTPATIENT
Start: 2023-08-14 | End: 2023-08-14 | Stop reason: SDUPTHER

## 2023-08-14 NOTE — TELEPHONE ENCOUNTER
Wife came she said his BP has been high , daily . [place] now til the 9/13/23 and leaving 09/14/23 til oct . Can you send medication to [facility] pharmacy on     Address: [address] [place] [address], [place] [address]  Hours:   Open ? Closes 7? PM  Phone: [phone]    She said he will be there this week in [place] .     If you have any question you can call his wife.

## 2023-08-14 NOTE — TELEPHONE ENCOUNTER
I have signed for the following orders AND/OR meds.  Please call the patient and ask the patient to schedule the testing AND/OR inform about any medications that were sent.        Medications Ordered This Encounter   Medications    lisinopriL-hydrochlorothiazide (PRINZIDE,ZESTORETIC) 10-12.5 mg per tablet     Sig: Take 1 tablet by mouth once daily.     Dispense:  30 tablet     Refill:  11     .

## 2023-08-14 NOTE — TELEPHONE ENCOUNTER
Noted.  Lov note 7/10/23 reviewed as well.     Lets try low dose bp med. I will send in. Take as directed.  Please have him continue to monitor his bp at home and bring log with him to appt.  Please schedule a visit in the next 1-2 weeks so we can check his bp again in office.      I have signed for the following orders AND/OR meds.  Please call the patient and ask the patient to schedule the testing AND/OR inform about any medications that were sent.        Medications Ordered This Encounter   Medications    lisinopriL-hydrochlorothiazide (PRINZIDE,ZESTORETIC) 10-12.5 mg per tablet     Sig: Take 1 tablet by mouth once daily.     Dispense:  30 tablet     Refill:  11     .

## 2023-08-14 NOTE — TELEPHONE ENCOUNTER
Lvm to set up BP nurse appointment for pt for 8/24/23 at 8 am , waiting to see if that appointment is agreeable .

## 2023-09-11 ENCOUNTER — TELEPHONE (OUTPATIENT)
Dept: FAMILY MEDICINE | Facility: CLINIC | Age: 33
End: 2023-09-11
Payer: COMMERCIAL

## 2023-09-11 DIAGNOSIS — K43.9 HERNIA OF ABDOMINAL WALL: Primary | ICD-10-CM

## 2023-09-13 NOTE — TELEPHONE ENCOUNTER
I have signed for the following orders AND/OR meds.  Please call the patient and ask the patient to schedule the testing AND/OR inform about any medications that were sent.      Orders Placed This Encounter   Procedures    Ambulatory referral/consult to General Surgery     Standing Status:   Future     Standing Expiration Date:   10/13/2024     Referral Priority:   Routine     Referral Type:   Consultation     Referral Reason:   Specialty Services Required     Requested Specialty:   General Surgery     Number of Visits Requested:   1

## 2024-02-19 ENCOUNTER — TELEPHONE (OUTPATIENT)
Dept: FAMILY MEDICINE | Facility: CLINIC | Age: 34
End: 2024-02-19
Payer: COMMERCIAL

## 2024-02-19 DIAGNOSIS — I10 HYPERTENSION, UNSPECIFIED TYPE: ICD-10-CM

## 2024-02-19 DIAGNOSIS — K21.9 GASTROESOPHAGEAL REFLUX DISEASE, UNSPECIFIED WHETHER ESOPHAGITIS PRESENT: ICD-10-CM

## 2024-02-19 DIAGNOSIS — F41.9 ANXIETY: ICD-10-CM

## 2024-02-19 DIAGNOSIS — R03.0 ELEVATED BLOOD PRESSURE READING: ICD-10-CM

## 2024-02-19 DIAGNOSIS — F90.9 ATTENTION DEFICIT HYPERACTIVITY DISORDER (ADHD), UNSPECIFIED ADHD TYPE: ICD-10-CM

## 2024-02-19 DIAGNOSIS — F17.210 CIGARETTE SMOKER: ICD-10-CM

## 2024-02-19 DIAGNOSIS — Z00.00 WELLNESS EXAMINATION: Primary | ICD-10-CM

## 2024-03-30 ENCOUNTER — HOSPITAL ENCOUNTER (EMERGENCY)
Facility: HOSPITAL | Age: 34
Discharge: HOME | End: 2024-03-31
Attending: INTERNAL MEDICINE
Payer: COMMERCIAL

## 2024-03-30 DIAGNOSIS — D78.31: Primary | ICD-10-CM

## 2024-03-30 LAB
ALBUMIN SERPL-MCNC: 3.5 G/DL (ref 3.5–5)
ALBUMIN/GLOB SERPL: 1.1 RATIO (ref 1.1–2)
ALP SERPL-CCNC: 89 UNIT/L (ref 40–150)
ALT SERPL-CCNC: 41 UNIT/L (ref 0–55)
APPEARANCE UR: CLEAR
APTT PPP: 30.7 SECONDS (ref 23.4–33.9)
AST SERPL-CCNC: 33 UNIT/L (ref 5–34)
BASOPHILS # BLD AUTO: 0.09 X10(3)/MCL
BASOPHILS NFR BLD AUTO: 0.8 %
BILIRUB SERPL-MCNC: 0.2 MG/DL
BILIRUB UR QL STRIP.AUTO: NEGATIVE
BUN SERPL-MCNC: 20.6 MG/DL (ref 8.9–20.6)
CALCIUM SERPL-MCNC: 9.5 MG/DL (ref 8.4–10.2)
CHLORIDE SERPL-SCNC: 101 MMOL/L (ref 98–107)
CO2 SERPL-SCNC: 27 MMOL/L (ref 22–29)
COLOR UR AUTO: NORMAL
CREAT SERPL-MCNC: 1.32 MG/DL (ref 0.73–1.18)
EOSINOPHIL # BLD AUTO: 0.46 X10(3)/MCL (ref 0–0.9)
EOSINOPHIL NFR BLD AUTO: 4.3 %
ERYTHROCYTE [DISTWIDTH] IN BLOOD BY AUTOMATED COUNT: 14.1 % (ref 11.5–17)
GFR SERPLBLD CREATININE-BSD FMLA CKD-EPI: >60 MLS/MIN/1.73/M2
GLOBULIN SER-MCNC: 3.1 GM/DL (ref 2.4–3.5)
GLUCOSE SERPL-MCNC: 118 MG/DL (ref 74–100)
GLUCOSE UR QL STRIP.AUTO: NEGATIVE
HCT VFR BLD AUTO: 31 % (ref 42–52)
HGB BLD-MCNC: 10.2 G/DL (ref 14–18)
IMM GRANULOCYTES # BLD AUTO: 0.12 X10(3)/MCL (ref 0–0.04)
IMM GRANULOCYTES NFR BLD AUTO: 1.1 %
INR PPP: 1 (ref 2–3)
KETONES UR QL STRIP.AUTO: NEGATIVE
LEUKOCYTE ESTERASE UR QL STRIP.AUTO: NEGATIVE
LIPASE SERPL-CCNC: 50 U/L
LYMPHOCYTES # BLD AUTO: 1.96 X10(3)/MCL (ref 0.6–4.6)
LYMPHOCYTES NFR BLD AUTO: 18.2 %
MAGNESIUM SERPL-MCNC: 2 MG/DL (ref 1.6–2.6)
MCH RBC QN AUTO: 29.2 PG (ref 27–31)
MCHC RBC AUTO-ENTMCNC: 32.9 G/DL (ref 33–36)
MCV RBC AUTO: 88.8 FL (ref 80–94)
MONOCYTES # BLD AUTO: 1.24 X10(3)/MCL (ref 0.1–1.3)
MONOCYTES NFR BLD AUTO: 11.5 %
NEUTROPHILS # BLD AUTO: 6.91 X10(3)/MCL (ref 2.1–9.2)
NEUTROPHILS NFR BLD AUTO: 64.1 %
NITRITE UR QL STRIP.AUTO: NEGATIVE
NRBC BLD AUTO-RTO: 0 %
PH UR STRIP.AUTO: 6 [PH]
PLATELET # BLD AUTO: 797 X10(3)/MCL (ref 130–400)
PMV BLD AUTO: 9.1 FL (ref 7.4–10.4)
POTASSIUM SERPL-SCNC: 3.9 MMOL/L (ref 3.5–5.1)
PROT SERPL-MCNC: 6.6 GM/DL (ref 6.4–8.3)
PROT UR QL STRIP.AUTO: NEGATIVE
PROTHROMBIN TIME: 12.8 SECONDS (ref 11.7–14.5)
RBC # BLD AUTO: 3.49 X10(6)/MCL (ref 4.7–6.1)
RBC UR QL AUTO: NEGATIVE
SODIUM SERPL-SCNC: 137 MMOL/L (ref 136–145)
SP GR UR STRIP.AUTO: 1.01 (ref 1–1.03)
UROBILINOGEN UR STRIP-ACNC: 0.2
WBC # SPEC AUTO: 10.78 X10(3)/MCL (ref 4.5–11.5)

## 2024-03-30 PROCEDURE — 85730 THROMBOPLASTIN TIME PARTIAL: CPT | Performed by: INTERNAL MEDICINE

## 2024-03-30 PROCEDURE — 83605 ASSAY OF LACTIC ACID: CPT | Performed by: INTERNAL MEDICINE

## 2024-03-30 PROCEDURE — 99285 EMERGENCY DEPT VISIT HI MDM: CPT | Mod: 25

## 2024-03-30 PROCEDURE — 25000003 PHARM REV CODE 250: Performed by: INTERNAL MEDICINE

## 2024-03-30 PROCEDURE — 83690 ASSAY OF LIPASE: CPT | Performed by: INTERNAL MEDICINE

## 2024-03-30 PROCEDURE — 83735 ASSAY OF MAGNESIUM: CPT | Performed by: INTERNAL MEDICINE

## 2024-03-30 PROCEDURE — 85025 COMPLETE CBC W/AUTO DIFF WBC: CPT | Performed by: INTERNAL MEDICINE

## 2024-03-30 PROCEDURE — 80053 COMPREHEN METABOLIC PANEL: CPT | Performed by: INTERNAL MEDICINE

## 2024-03-30 PROCEDURE — 85610 PROTHROMBIN TIME: CPT | Performed by: INTERNAL MEDICINE

## 2024-03-30 PROCEDURE — 81003 URINALYSIS AUTO W/O SCOPE: CPT | Performed by: INTERNAL MEDICINE

## 2024-03-30 PROCEDURE — 96361 HYDRATE IV INFUSION ADD-ON: CPT

## 2024-03-30 RX ADMIN — IOHEXOL 100 ML: 350 INJECTION, SOLUTION INTRAVENOUS at 11:03

## 2024-03-30 RX ADMIN — SODIUM CHLORIDE 1000 ML: 9 INJECTION, SOLUTION INTRAVENOUS at 10:03

## 2024-03-31 VITALS
BODY MASS INDEX: 29.35 KG/M2 | HEIGHT: 70 IN | WEIGHT: 205 LBS | OXYGEN SATURATION: 98 % | HEART RATE: 96 BPM | SYSTOLIC BLOOD PRESSURE: 134 MMHG | DIASTOLIC BLOOD PRESSURE: 72 MMHG | TEMPERATURE: 99 F | RESPIRATION RATE: 20 BRPM

## 2024-03-31 LAB
LACTATE SERPL-SCNC: 0.6 MMOL/L (ref 0.5–2.2)
MONO SCR (OHS): NEGATIVE

## 2024-03-31 PROCEDURE — 25500020 PHARM REV CODE 255: Performed by: INTERNAL MEDICINE

## 2024-03-31 PROCEDURE — 63600175 PHARM REV CODE 636 W HCPCS: Performed by: INTERNAL MEDICINE

## 2024-03-31 PROCEDURE — 96365 THER/PROPH/DIAG IV INF INIT: CPT

## 2024-03-31 PROCEDURE — 87040 BLOOD CULTURE FOR BACTERIA: CPT | Performed by: INTERNAL MEDICINE

## 2024-03-31 PROCEDURE — 25000003 PHARM REV CODE 250: Performed by: INTERNAL MEDICINE

## 2024-03-31 PROCEDURE — 86308 HETEROPHILE ANTIBODY SCREEN: CPT | Performed by: INTERNAL MEDICINE

## 2024-03-31 RX ORDER — CEFDINIR 300 MG/1
300 CAPSULE ORAL 2 TIMES DAILY
Qty: 20 CAPSULE | Refills: 0 | Status: SHIPPED | OUTPATIENT
Start: 2024-03-31 | End: 2024-04-10

## 2024-03-31 RX ORDER — CEFDINIR 300 MG/1
300 CAPSULE ORAL 2 TIMES DAILY
Qty: 20 CAPSULE | Refills: 0 | Status: SHIPPED | OUTPATIENT
Start: 2024-03-31 | End: 2024-03-31

## 2024-03-31 RX ADMIN — SODIUM CHLORIDE 1000 ML: 9 INJECTION, SOLUTION INTRAVENOUS at 12:03

## 2024-03-31 RX ADMIN — PIPERACILLIN SODIUM AND TAZOBACTAM SODIUM 4.5 G: 4; .5 INJECTION, POWDER, LYOPHILIZED, FOR SOLUTION INTRAVENOUS at 12:03

## 2024-03-31 NOTE — ED PROVIDER NOTES
Source of History:  Patient, significant other, no limitations    Chief complaint:  Abdominal Pain (C/o abdominal discomfort/pain -had spleen removed 10 days ago in [place]-stitches approximated-no drainage-has an appt 4/4 with dr edwards for follow up care)      HPI:  Elsie Ahuja is a 33 y.o. 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 Endorses good appetite and regular BM.     Patient presents for evaluation of abdominal pain. Onset of symptoms was gradual starting a few days ago with gradually worsening course since that time. The pain is located diffusely. The pain is rated as moderate and generalized. The pain is made worse by pressure and is relieved by nothing. The patient also complains of chills. The patient denies anorexia, constipation, diarrhea, and vomiting. The past workup has included surgery.       Review of Systems   Constitutional symptoms:  one time fever of 101F yesterday  Skin symptoms:  Negative except as documented in HPI.   HEENT symptoms:  Negative except as documented in HPI.   Respiratory symptoms:  Negative except as documented in HPI.   Cardiovascular symptoms:  Negative except as documented in HPI.   Gastrointestinal symptoms:  Negative except as documented in HPI.    Genitourinary symptoms:  Negative except as documented in HPI.   Musculoskeletal symptoms:  Negative except as documented in HPI.   Neurologic symptoms:  Negative except as documented in HPI.   Psychiatric symptoms:  Negative except as documented in HPI.   Allergy/immunologic symptoms:  Negative except as documented in HPI.             Additional review of systems information: All other systems reviewed and  "otherwise negative.      Review of patient's allergies indicates:   Allergen Reactions    Sulfa (sulfonamide antibiotics) Nausea And Vomiting       PMH:  As per HPI and below:    Past Medical History:   Diagnosis Date    ADHD (attention deficit hyperactivity disorder)     Anxiety         Family History   Problem Relation Age of Onset    Arthritis Mother     Depression Mother     Hyperlipidemia Mother     Hypertension Mother     Arthritis Father     Diabetes Father     Hearing loss Father     Hyperlipidemia Father     Hypertension Father        History reviewed. No pertinent surgical history.    Social History     Tobacco Use    Smoking status: Some Days     Types: Cigarettes    Smokeless tobacco: Never    Tobacco comments:     Smokes socially. Less than 1 pack in 6 months    Substance Use Topics    Alcohol use: Not Currently    Drug use: Never       Patient Active Problem List   Diagnosis    Opioid abuse    Anxiety    ADHD (attention deficit hyperactivity disorder)    Cigarette smoker    Family history of diabetes mellitus    Onychomycosis    Hepatitis C    Nocturia    Hernia of abdominal wall    Screen for STD (sexually transmitted disease)    Elevated blood pressure reading        Physical Exam:    /72   Pulse 96   Temp 98.8 °F (37.1 °C) (Oral)   Resp 20   Ht 5' 10" (1.778 m)   Wt 93 kg (205 lb)   SpO2 98%   BMI 29.41 kg/m²     Nursing note and vital signs reviewed.    General:  Alert, no acute distress.   Skin: Normal for Ethnic Origin, No cyanosis, post surgical wound well appearing with staples in place  HEENT: Normocephalic and atraumatic, Vision unchanged, Pupils symmetric, No icterus , Nasal mucosa is pink and moist  Cardiovascular:  Regular rate and rhythm  Chest Wall: No deformity, equal chest rise  Respiratory:  Lungs are clear to auscultation, respirations are non-labored.    Musculoskeletal:  No deformity, Normal perfusion to all extremities  Gastrointestinal:  Soft, Non distended, " generalized tenderness, active BS  Neurological:  Alert and oriented, normal motor observed, normal speech observed.    Psychiatric:  Cooperative, appropriate mood & affect.         Media Information    File Link    Scan on 3/30/2024 10:08 PM by Vic Reed DO         Labs that have been ordered have been independently reviewed and interpreted by myself.     Old Chart Reviewed.      Initial Impression/ Differential Dx:  GERD, intestinal spasm, gastroenteritis, gastritis, ulcer, cholecystitis, cholelithiasis, gallstones, pancreatitis, ileus, small bowel obstruction, appendicitis, diverticulitis, colitis, constipation, intestinal gas pain.      MDM:      Reviewed Nurses Note.    Reviewed Pertinent old records.    Orders Placed This Encounter    Blood Culture #1 **CANNOT BE ORDERED STAT**    Blood Culture #2 **CANNOT BE ORDERED STAT**    CT Abdomen Pelvis With IV Contrast NO Oral Contrast    CBC Auto Differential    Comprehensive Metabolic Panel    Protime-INR    APTT    Lipase    Magnesium    Urinalysis, Reflex to Urine Culture    CBC with Differential    Lactic Acid, Plasma    MONONUCLEOSIS SCREEN    Ambulatory referral/consult to General Surgery    Inpatient consult to General surgery    Insert peripheral IV    sodium chloride 0.9% bolus 1,000 mL 1,000 mL    iohexoL (OMNIPAQUE 350) injection 100 mL    sodium chloride 0.9% bolus 1,000 mL 1,000 mL    piperacillin-tazobactam (ZOSYN) 4.5 g in dextrose 5 % in water (D5W) 100 mL IVPB (MB+)    cefdinir (OMNICEF) 300 MG capsule                    Labs Reviewed   COMPREHENSIVE METABOLIC PANEL - Abnormal; Notable for the following components:       Result Value    Glucose Level 118 (*)     Creatinine 1.32 (*)     All other components within normal limits   PROTIME-INR - Abnormal; Notable for the following components:    INR 1.0 (*)     All other components within normal limits   CBC WITH DIFFERENTIAL - Abnormal; Notable for the following components:    RBC 3.49 (*)      Hgb 10.2 (*)     Hct 31.0 (*)     MCHC 32.9 (*)     Platelet 797 (*)     IG# 0.12 (*)     All other components within normal limits   APTT - Normal   LIPASE - Normal   MAGNESIUM - Normal   URINALYSIS, REFLEX TO URINE CULTURE - Normal   LACTIC ACID, PLASMA - Normal   BLOOD CULTURE OLG   BLOOD CULTURE OLG   CBC W/ AUTO DIFFERENTIAL    Narrative:     The following orders were created for panel order CBC Auto Differential.  Procedure                               Abnormality         Status                     ---------                               -----------         ------                     CBC with Differential[[id number]]       Abnormal            Final result                 Please view results for these tests on the individual orders.   MONONUCLEOSIS SCREEN          CT Abdomen Pelvis With IV Contrast NO Oral Contrast              Admission on 03/30/2024   Component Date Value Ref Range Status    Sodium Level 03/30/2024 137  136 - 145 mmol/L Final    Potassium Level 03/30/2024 3.9  3.5 - 5.1 mmol/L Final    Chloride 03/30/2024 101  98 - 107 mmol/L Final    Carbon Dioxide 03/30/2024 27  22 - 29 mmol/L Final    Glucose Level 03/30/2024 118 (H)  74 - 100 mg/dL Final    Blood Urea Nitrogen 03/30/2024 20.6  8.9 - 20.6 mg/dL Final    Creatinine 03/30/2024 1.32 (H)  0.73 - 1.18 mg/dL Final    Calcium Level Total 03/30/2024 9.5  8.4 - 10.2 mg/dL Final    Protein Total 03/30/2024 6.6  6.4 - 8.3 gm/dL Final    Albumin Level 03/30/2024 3.5  3.5 - 5.0 g/dL Final    Globulin 03/30/2024 3.1  2.4 - 3.5 gm/dL Final    Albumin/Globulin Ratio 03/30/2024 1.1  1.1 - 2.0 ratio Final    Bilirubin Total 03/30/2024 0.2  <=1.5 mg/dL Final    Alkaline Phosphatase 03/30/2024 89  40 - 150 unit/L Final    Alanine Aminotransferase 03/30/2024 41  0 - 55 unit/L Final    Aspartate Aminotransferase 03/30/2024 33  5 - 34 unit/L Final    eGFR 03/30/2024 >60  mls/min/1.73/m2 Final    PT 03/30/2024 12.8  11.7 - 14.5 seconds Final    INR 03/30/2024  1.0 (L)  2.0 - 3.0 Final    PTT 03/30/2024 30.7  23.4 - 33.9 seconds Final    Lipase Level 03/30/2024 50  <=60 U/L Final    Magnesium Level 03/30/2024 2.00  1.60 - 2.60 mg/dL Final    Color, UA 03/30/2024 Light-Yellow  Yellow, Light-Yellow, Dark Yellow, Maria T, Straw Final    Appearance, UA 03/30/2024 Clear  Clear Final    Specific Gravity, UA 03/30/2024 1.010  1.005 - 1.030 Final    pH, UA 03/30/2024 6.0  5.0 - 8.5 Final    Protein, UA 03/30/2024 Negative  Negative Final    Glucose, UA 03/30/2024 Negative  Negative, Normal Final    Ketones, UA 03/30/2024 Negative  Negative Final    Blood, UA 03/30/2024 Negative  Negative Final    Bilirubin, UA 03/30/2024 Negative  Negative Final    Urobilinogen, UA 03/30/2024 0.2  0.2, 1.0, Normal Final    Nitrites, UA 03/30/2024 Negative  Negative Final    Leukocyte Esterase, UA 03/30/2024 Negative  Negative Final    WBC 03/30/2024 10.78  4.50 - 11.50 x10(3)/mcL Final    RBC 03/30/2024 3.49 (L)  4.70 - 6.10 x10(6)/mcL Final    Hgb 03/30/2024 10.2 (L)  14.0 - 18.0 g/dL Final    Hct 03/30/2024 31.0 (L)  42.0 - 52.0 % Final    MCV 03/30/2024 88.8  80.0 - 94.0 fL Final    MCH 03/30/2024 29.2  27.0 - 31.0 pg Final    MCHC 03/30/2024 32.9 (L)  33.0 - 36.0 g/dL Final    RDW 03/30/2024 14.1  11.5 - 17.0 % Final    Platelet 03/30/2024 797 (H)  130 - 400 x10(3)/mcL Final    MPV 03/30/2024 9.1  7.4 - 10.4 fL Final    Neut % 03/30/2024 64.1  % Final    Lymph % 03/30/2024 18.2  % Final    Mono % 03/30/2024 11.5  % Final    Eos % 03/30/2024 4.3  % Final    Basophil % 03/30/2024 0.8  % Final    Lymph # 03/30/2024 1.96  0.6 - 4.6 x10(3)/mcL Final    Neut # 03/30/2024 6.91  2.1 - 9.2 x10(3)/mcL Final    Mono # 03/30/2024 1.24  0.1 - 1.3 x10(3)/mcL Final    Eos # 03/30/2024 0.46  0 - 0.9 x10(3)/mcL Final    Baso # 03/30/2024 0.09  <=0.2 x10(3)/mcL Final    IG# 03/30/2024 0.12 (H)  0 - 0.04 x10(3)/mcL Final    IG% 03/30/2024 1.1  % Final    NRBC% 03/30/2024 0.0  % Final    Lactic Acid Level  03/30/2024 0.6  0.5 - 2.2 mmol/L Final       Imaging Results              CT Abdomen Pelvis With IV Contrast NO Oral Contrast (Preliminary result)  Result time 03/30/24 23:37:12      Preliminary result by Bryn Soler MD (03/30/24 23:37:12)                   Narrative:    START OF REPORT:  Technique: CT of the abdomen and pelvis was performed with axial images as well as sagittal and coronal reconstruction images without intravenous contrast.    Comparison: None available.    Clinical History: Abdominal abscess/infection suspected.    Dosage Information: Automated Exposure Control was utilized 320.24 mGy.cm.    Findings:  Lines and Tubes: None.  Thorax:  Lungs: The visualized lung bases appear unremarkable.  Pleura: No effusions or thickening.  Heart: The heart size is within normal limits.  Abdomen:  Abdominal Wall: There are ill-defined hypodensities along the left anterior abdominal wall musculature with streaky fat stranding along their fat planes. Cutaneous surgical staples overlying this region are also noted. These are likely reflective of post-operative changes. There are no focal rim enhancing collections to suggest abscess formation.  Liver: The liver appears unremarkable.  Biliary System: No extrahepatic biliary duct dilatation is seen.  Gallbladder: The gallbladder appears unremarkable.  Pancreas: The pancreas appears unremarkable.  Spleen: There is an ill-defined somewhat heterogeneous fluid collection in the region of the spleen. This likely represents a postoperative hematoma.  Adrenals: The adrenal glands appear unremarkable.  Kidneys: The kidneys appear unremarkable with no stones cysts masses or hydronephrosis.  Aorta: The visualized abdominal aorta appears unremarkable.  IVC: Unremarkable.  Bowel:  Esophagus: The visualized distal esophagus appears unremarkable.  Stomach: The stomach appears unremarkable.  Duodenum: Unremarkable appearing duodenum.  Small Bowel: The small bowel appears  unremarkable.  Colon: Nondistended.  Appendix: The appendix is not identified but no inflammatory changes are seen in the right lower quadrant to suggest appendicitis.  Peritoneum: No intraperitoneal free air or ascites is seen.    Pelvis:  Bladder: The bladder appears unremarkable.  Male:  Prostate gland: The prostate gland appears unremarkable.    Bony structures:  Dorsal Spine: The visualized dorsal spine appears unremarkable.  Bony Pelvis: The visualized bony structures of the pelvis appear unremarkable.      Impression:  1. There is an ill-defined somewhat heterogeneous fluid collection in the region of the spleen. This likely represents a postoperative hematoma. The possibility of an infectious component is not excluded although no organized collection is identified to suggest abscess. Follow-up as clinically indicated.  2. No acute intraabdominal or pelvic solid organ or bowel pathology identified. Details and other findings as discussed above.                                                       ED Course as of 03/31/24 0153   Sat Mar 30, 2024   2345 WBC: 10.78 [MP]   2346 Hemoglobin(!): 10.2 [MP]   2346 Hematocrit(!): 31.0 [MP]   2346 Platelet Count(!): 797 [MP]   2350 Spoke with Surgery Resident Dr Corona, says she will call back  [MP]   Sun Mar 31, 2024   0020 Lactic Acid Level: 0.6 [MP]   0123 Gen Surg resident reviewed case, ok with clinic follow up [MP]      ED Course User Index  [MP] Vic Reed DO                        Diagnostic Impression:    1. Postoperative hematoma of spleen following procedure on spleen         ED Disposition Condition    Discharge Stable             Follow-up Information       [facility] - Emergency Dept.    Specialty: Emergency Medicine  Why: If symptoms worsen  Contact information:  [address]  [facility] [address] [phone]             Call  Nikunj Trinidad MD.    Specialty: General Surgery  Contact  information:  [address]  [address]  [facility] [address]  [phone]                              ED Prescriptions       Medication Sig Dispense Start Date End Date Auth. Provider    cefdinir (OMNICEF) 300 MG capsule  (Status: Discontinued) Take 1 capsule (300 mg total) by mouth 2 (two) times daily. for 10 days 20 capsule 3/31/2024 3/31/2024 Vic Reed DO    cefdinir (OMNICEF) 300 MG capsule Take 1 capsule (300 mg total) by mouth 2 (two) times daily. for 10 days 20 capsule 3/31/2024 4/10/2024 Vic Reed DO          Follow-up Information       Follow up With Specialties Details Why Contact Info    [facility] - Emergency Dept Emergency Medicine  If symptoms worsen [address]  [facility] [address]  [phone]    Nikunj Trinidad MD General Surgery Call   [address]  [address]  [facility] [address]  [phone]               Vic Reed DO  03/31/24 0154

## 2024-04-04 ENCOUNTER — TELEPHONE (OUTPATIENT)
Dept: FAMILY MEDICINE | Facility: CLINIC | Age: 34
End: 2024-04-04

## 2024-04-04 ENCOUNTER — HOSPITAL ENCOUNTER (EMERGENCY)
Facility: HOSPITAL | Age: 34
Discharge: HOME | End: 2024-04-04
Attending: EMERGENCY MEDICINE
Payer: COMMERCIAL

## 2024-04-04 ENCOUNTER — LAB VISIT (OUTPATIENT)
Dept: LAB | Facility: HOSPITAL | Age: 34
End: 2024-04-04
Attending: FAMILY MEDICINE
Payer: COMMERCIAL

## 2024-04-04 ENCOUNTER — OFFICE VISIT (OUTPATIENT)
Dept: FAMILY MEDICINE | Facility: CLINIC | Age: 34
End: 2024-04-04
Payer: COMMERCIAL

## 2024-04-04 VITALS
SYSTOLIC BLOOD PRESSURE: 149 MMHG | RESPIRATION RATE: 18 BRPM | DIASTOLIC BLOOD PRESSURE: 68 MMHG | BODY MASS INDEX: 27.2 KG/M2 | TEMPERATURE: 98 F | WEIGHT: 190 LBS | HEIGHT: 70 IN | HEART RATE: 89 BPM | OXYGEN SATURATION: 99 %

## 2024-04-04 VITALS
HEIGHT: 70 IN | TEMPERATURE: 98 F | DIASTOLIC BLOOD PRESSURE: 74 MMHG | RESPIRATION RATE: 18 BRPM | HEART RATE: 85 BPM | BODY MASS INDEX: 26.47 KG/M2 | OXYGEN SATURATION: 96 % | WEIGHT: 184.88 LBS | SYSTOLIC BLOOD PRESSURE: 130 MMHG

## 2024-04-04 DIAGNOSIS — Z09 HOSPITAL DISCHARGE FOLLOW-UP: Primary | ICD-10-CM

## 2024-04-04 DIAGNOSIS — Z90.81 S/P SPLENECTOMY: ICD-10-CM

## 2024-04-04 DIAGNOSIS — Z90.81 THROMBOCYTOSIS AFTER SPLENECTOMY: ICD-10-CM

## 2024-04-04 DIAGNOSIS — F41.9 ANXIETY: Primary | ICD-10-CM

## 2024-04-04 DIAGNOSIS — D75.838 THROMBOCYTOSIS AFTER SPLENECTOMY: ICD-10-CM

## 2024-04-04 DIAGNOSIS — I10 HYPERTENSION, UNSPECIFIED TYPE: ICD-10-CM

## 2024-04-04 DIAGNOSIS — R76.8 HEPATITIS C ANTIBODY TEST POSITIVE: ICD-10-CM

## 2024-04-04 DIAGNOSIS — T14.8XXA HEMATOMA: ICD-10-CM

## 2024-04-04 DIAGNOSIS — R10.10 PAIN OF UPPER ABDOMEN: ICD-10-CM

## 2024-04-04 DIAGNOSIS — R79.89 ELEVATED SERUM CREATININE: ICD-10-CM

## 2024-04-04 LAB
ALBUMIN SERPL-MCNC: 3.9 G/DL (ref 3.5–5)
ALBUMIN/GLOB SERPL: 1.1 RATIO (ref 1.1–2)
ALP SERPL-CCNC: 104 UNIT/L (ref 40–150)
ALT SERPL-CCNC: 38 UNIT/L (ref 0–55)
AST SERPL-CCNC: 31 UNIT/L (ref 5–34)
BASOPHILS # BLD AUTO: 0.1 X10(3)/MCL
BASOPHILS NFR BLD AUTO: 0.8 %
BILIRUB SERPL-MCNC: 0.4 MG/DL
BUN SERPL-MCNC: 20.2 MG/DL (ref 8.9–20.6)
CALCIUM SERPL-MCNC: 9.8 MG/DL (ref 8.4–10.2)
CHLORIDE SERPL-SCNC: 101 MMOL/L (ref 98–107)
CO2 SERPL-SCNC: 26 MMOL/L (ref 22–29)
CREAT SERPL-MCNC: 1.04 MG/DL (ref 0.73–1.18)
EOSINOPHIL # BLD AUTO: 0.34 X10(3)/MCL (ref 0–0.9)
EOSINOPHIL NFR BLD AUTO: 2.9 %
ERYTHROCYTE [DISTWIDTH] IN BLOOD BY AUTOMATED COUNT: 14.1 % (ref 11.5–17)
EST. AVERAGE GLUCOSE BLD GHB EST-MCNC: 91.1 MG/DL
GFR SERPLBLD CREATININE-BSD FMLA CKD-EPI: >60 MLS/MIN/1.73/M2
GLOBULIN SER-MCNC: 3.4 GM/DL (ref 2.4–3.5)
GLUCOSE SERPL-MCNC: 83 MG/DL (ref 74–100)
HBA1C MFR BLD: 4.8 %
HCT VFR BLD AUTO: 38.2 % (ref 42–52)
HGB BLD-MCNC: 12.5 G/DL (ref 14–18)
IMM GRANULOCYTES # BLD AUTO: 0.04 X10(3)/MCL (ref 0–0.04)
IMM GRANULOCYTES NFR BLD AUTO: 0.3 %
LYMPHOCYTES # BLD AUTO: 1.31 X10(3)/MCL (ref 0.6–4.6)
LYMPHOCYTES NFR BLD AUTO: 11 %
MCH RBC QN AUTO: 29.2 PG (ref 27–31)
MCHC RBC AUTO-ENTMCNC: 32.7 G/DL (ref 33–36)
MCV RBC AUTO: 89.3 FL (ref 80–94)
MONOCYTES # BLD AUTO: 1.11 X10(3)/MCL (ref 0.1–1.3)
MONOCYTES NFR BLD AUTO: 9.3 %
NEUTROPHILS # BLD AUTO: 9.02 X10(3)/MCL (ref 2.1–9.2)
NEUTROPHILS NFR BLD AUTO: 75.7 %
NRBC BLD AUTO-RTO: 0 %
PLATELET # BLD AUTO: 786 X10(3)/MCL (ref 130–400)
PMV BLD AUTO: 9.8 FL (ref 7.4–10.4)
POTASSIUM SERPL-SCNC: 4.1 MMOL/L (ref 3.5–5.1)
PROT SERPL-MCNC: 7.3 GM/DL (ref 6.4–8.3)
RBC # BLD AUTO: 4.28 X10(6)/MCL (ref 4.7–6.1)
SODIUM SERPL-SCNC: 137 MMOL/L (ref 136–145)
WBC # SPEC AUTO: 11.92 X10(3)/MCL (ref 4.5–11.5)

## 2024-04-04 PROCEDURE — 99281 EMR DPT VST MAYX REQ PHY/QHP: CPT

## 2024-04-04 PROCEDURE — 85025 COMPLETE CBC W/AUTO DIFF WBC: CPT

## 2024-04-04 PROCEDURE — 87522 HEPATITIS C REVRS TRNSCRPJ: CPT

## 2024-04-04 PROCEDURE — 4010F ACE/ARB THERAPY RXD/TAKEN: CPT | Mod: CPTII,,, | Performed by: FAMILY MEDICINE

## 2024-04-04 PROCEDURE — 99214 OFFICE O/P EST MOD 30 MIN: CPT | Mod: ,,, | Performed by: FAMILY MEDICINE

## 2024-04-04 PROCEDURE — 3078F DIAST BP <80 MM HG: CPT | Mod: CPTII,,, | Performed by: FAMILY MEDICINE

## 2024-04-04 PROCEDURE — 3075F SYST BP GE 130 - 139MM HG: CPT | Mod: CPTII,,, | Performed by: FAMILY MEDICINE

## 2024-04-04 PROCEDURE — 36415 COLL VENOUS BLD VENIPUNCTURE: CPT

## 2024-04-04 PROCEDURE — 80053 COMPREHEN METABOLIC PANEL: CPT

## 2024-04-04 PROCEDURE — 83036 HEMOGLOBIN GLYCOSYLATED A1C: CPT

## 2024-04-04 PROCEDURE — 87902 NFCT AGT GNTYP ALYS HEP C: CPT

## 2024-04-04 PROCEDURE — 1160F RVW MEDS BY RX/DR IN RCRD: CPT | Mod: CPTII,,, | Performed by: FAMILY MEDICINE

## 2024-04-04 PROCEDURE — 1159F MED LIST DOCD IN RCRD: CPT | Mod: CPTII,,, | Performed by: FAMILY MEDICINE

## 2024-04-04 NOTE — ED PROVIDER NOTES
"Encounter Date: 4/4/2024       History     Chief Complaint   Patient presents with    Abdominal Pain     Reports of having a spleenectomy performed on 3/16 after having some internal bleeding. Patient was just seen pta by his doctor for follow up after discharge from the hospital and he states after she palpated his abdomen he began having the same sensation he was having when he was having the internal bleeding. Vital signs stable in triage. Reports of upper left abdominal "swelling sensation" and had 1 episode of emesis at home.      Patient states he has anxiety for which he takes BuSpar and that he is now realizing that he was just becoming very anxious.  He was afraid that when his doctor pushed on his abdomen that she had "open something up on the inside" and that he was going to go to sleep and not wake up.  He states that pain he is having in his abdomen is similar to his postoperative pain status post splenectomy.      Review of patient's allergies indicates:   Allergen Reactions    Sulfa (sulfonamide antibiotics) Nausea And Vomiting     Past Medical History:   Diagnosis Date    ADHD (attention deficit hyperactivity disorder)     Anxiety      No past surgical history on file.  Family History   Problem Relation Age of Onset    Arthritis Mother     Depression Mother     Hyperlipidemia Mother     Hypertension Mother     Arthritis Father     Diabetes Father     Hearing loss Father     Hyperlipidemia Father     Hypertension Father      Social History     Tobacco Use    Smoking status: Never    Smokeless tobacco: Never    Tobacco comments:     Smokes socially. Less than 1 pack in 6 months    Substance Use Topics    Alcohol use: Not Currently    Drug use: Never     Review of Systems   All other systems reviewed and are negative.      Physical Exam     Initial Vitals [04/04/24 1010]   BP Pulse Resp Temp SpO2   (!) 150/67 93 20 97.5 °F (36.4 °C) 99 %      MAP       --         Physical Exam    Nursing note and vitals " reviewed.  Constitutional: He appears well-developed.   HENT:   Head: Normocephalic.   Eyes: Conjunctivae are normal.   Cardiovascular:  Normal rate, regular rhythm and normal heart sounds.           Pulmonary/Chest: Breath sounds normal.   Abdominal: Abdomen is soft. Bowel sounds are normal. He exhibits no distension. There is abdominal tenderness.   Patient has very mild tenderness in his epigastrium and left upper quadrant.  There is no guarding no rebound.  There is no ecchymosis.  Otherwise his abdomen is soft nontender nondistended with no abnormal bowel sounds.   Musculoskeletal:         General: No edema.     Lymphadenopathy:     He has no cervical adenopathy.   Neurological: He is alert.   Skin: Skin is warm.   Psychiatric: He has a normal mood and affect.         ED Course   Procedures  Labs Reviewed - No data to display       Imaging Results    None          Medications - No data to display  Medical Decision Making  Medical Decision Making  Problem list/ differential diagnosis including but not limited to:  Pancreatitis, gastritis, perforated viscus, aortic aneurysm, aortic dissection     Patient's chronic illnesses impacting care:  Anxiety        Diagnostic test considered but not ordered: CT a/p     Radiology reports       Discussion of case with external qualified healthcare professionals:  Not applicable        Review of external notes( inpt, ems, NH, clinic): in [facility]        Decision rules/scores:     Medications reviewed: all  Medications ordered in the ER:   Discharge prescriptions:      Social variables possible impacting patient's healthcare: none     Code status/discussion     Shared decision making:     Consideration for admission versus discharge: stable for discharge      Amount and/or Complexity of Data Reviewed  Labs:  CBC and CMP were performed today and were reviewed in the results system in epic.  Everything was normal except for a white blood cell count of 11.5.  Radiology: ordered.      Risk        Amount and/or Complexity of Data Reviewed  External Data Reviewed: notes.  Labs:  Decision-making details documented in ED Course.                                      Clinical Impression:  Final diagnoses:  [F41.9] Anxiety (Primary)  [R10.10] Pain of upper abdomen - postoperative, chronic  [I10] Hypertension, unspecified type          ED Disposition Condition    Discharge Stable          ED Prescriptions    None       Follow-up Information       Follow up With Specialties Details Why Contact Info    Zayda Sosa MD Family Medicine In 2 days  [address] [place] [address]  [address] [place] [id number]  [phone]               Papo Khan Jr., MD  04/04/24 9013

## 2024-04-04 NOTE — PROGRESS NOTES
Transitional Care Note  Subjective:   Patient ID: Elsie Ahuja is a 33 y.o. male.    Chief Complaint: Follow-up (F/U from surgery. Spleen rupture. Patient reports weight loss of 25 lb since March 16. Abd. discomfort)     Date of Hospital Discharge: 03/16/2024   Family and/or Caretaker present at visit?  No  Diagnostic tests reviewed/disposition: No diagnosic tests pending after this hospitalization.  Admission disease/illness: Ruptured spleen s/p splenectomy  Home health/community services discussion/referrals: Patient does not have home health established from hospital visit.  They do not need home health.  If needed, we will set up home health for the patient.   Discussion with other health care providers:  Referral to general surgery sent .     HPI: 33-year-old male who presents for follow-up after recent hospital discharge and ED visit.  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  The patient states that the nares port was in [place] and he was taken to the hospital for evaluation.  He states that CT abdomen and pelvis revealed internal bleeding however when patient was being prepped for repeat imaging he states he lost consciousness.  During exploratory laparotomy patient was found to have ruptured spleen and underwent splenectomy.  Postop patient returned to his home however began having fevers and continued abdominal discomfort.  He was taken to the ED on 03/31/2024.  CT abdomen and pelvis revealed fluid collection concerning for hematoma and patient was referred to General surgery.  He was also treated with Omnicef.  The patient states he has been afebrile but has continued to have intermittent abdominal discomfort especially to palpation.  He denies severe abdominal pain, nausea or vomiting.  During ED visit patient was also found to have anemia and thrombocytosis.  The patient requests further evaluation for hepatitis-C as he had a positive  "hepatitis-C antibody approximately 8 months ago.  He previous treatment for hepatitis-C.      Review of Systems   Constitutional:  Negative for chills, fatigue and fever.   Respiratory:  Negative for shortness of breath.    Cardiovascular:  Negative for chest pain.   Gastrointestinal:  Positive for abdominal pain. Negative for abdominal distention, diarrhea, nausea and vomiting.   Neurological:  Negative for dizziness.     History:     Past Medical History:   Diagnosis Date    ADHD (attention deficit hyperactivity disorder)     Anxiety       History reviewed. No pertinent surgical history.  Family History   Problem Relation Age of Onset    Arthritis Mother     Depression Mother     Hyperlipidemia Mother     Hypertension Mother     Arthritis Father     Diabetes Father     Hearing loss Father     Hyperlipidemia Father     Hypertension Father       Social History     Tobacco Use    Smoking status: Never    Smokeless tobacco: Never    Tobacco comments:     Smokes socially. Less than 1 pack in 6 months    Substance and Sexual Activity    Alcohol use: Not Currently    Drug use: Never    Sexual activity: Yes     Partners: Female     Birth control/protection: I.U.D.        Allergies:   Review of patient's allergies indicates:   Allergen Reactions    Sulfa (sulfonamide antibiotics) Nausea And Vomiting     Objective:     Vitals:    04/04/24 0744   BP: 130/74   Pulse: 85   Resp: 18   Temp: 98.4 °F (36.9 °C)   TempSrc: Oral   SpO2: 96%   Weight: 83.9 kg (184 lb 14.4 oz)   Height: 5' 10" (1.778 m)   PainSc:   2   PainLoc: Abdomen     Body mass index is 26.53 kg/m².   Wt Readings from Last 4 Encounters:   04/04/24 83.9 kg (184 lb 14.4 oz)   03/30/24 93 kg (205 lb)   07/10/23 94 kg (207 lb 3.2 oz)   03/07/23 92.4 kg (203 lb 12.8 oz)     Physical Examination:   Physical Exam  Constitutional:       General: He is not in acute distress.     Appearance: Normal appearance. He is not ill-appearing or toxic-appearing.   HENT:      Head: " Normocephalic and atraumatic.      Nose: Nose normal.      Mouth/Throat:      Mouth: Mucous membranes are moist.   Eyes:      Extraocular Movements: Extraocular movements intact.      Conjunctiva/sclera: Conjunctivae normal.   Cardiovascular:      Rate and Rhythm: Normal rate and regular rhythm.      Pulses: Normal pulses.      Heart sounds: Normal heart sounds. No murmur heard.     No gallop.   Pulmonary:      Effort: Pulmonary effort is normal. No respiratory distress.      Breath sounds: Normal breath sounds. No stridor. No wheezing, rhonchi or rales.   Abdominal:          Comments: Mild erythema, no discharge, no swelling, tenderness proximal to incision sites, no guarding, no rebound, no distention, positive bowel sounds in all 4 quadrants, no shifting dullness no rigidity, no tenderness at McBurney's point   Musculoskeletal:         General: Normal range of motion.      Cervical back: Normal range of motion.   Skin:     General: Skin is warm and dry.      Coloration: Skin is not pale.   Neurological:      General: No focal deficit present.      Mental Status: He is alert and oriented to person, place, and time.   Psychiatric:         Mood and Affect: Mood normal.         Behavior: Behavior normal.         Thought Content: Thought content normal.         Diagnostic Tests:  Significant Imaging: I have reviewed and interpreted all pertinent imaging results/findings.  CT Abdomen Pelvis With IV Contrast NO Oral Contrast  Narrative: Technique: CT of the abdomen and pelvis was performed with axial images as well as sagittal and coronal reconstruction images without intravenous contrast.    Comparison: None available.    Clinical History: Abdominal abscess/infection suspected.    Dosage Information: Automated Exposure Control was utilized 320.24 mGy.cm.    Findings:    Lines and Tubes: None.    Thorax:    Lungs: The visualized lung bases appear unremarkable.    Pleura: No effusions or thickening.    Heart: The heart  size is within normal limits.    Abdomen:    Abdominal Wall: There are ill-defined hypodensities along the left anterior abdominal wall musculature with streaky fat stranding along their fat planes. Cutaneous surgical staples overlying this region are also noted. These are likely reflective of post-operative changes. There are no focal rim enhancing collections to suggest abscess formation.    Liver: The liver appears unremarkable.    Biliary System: No extrahepatic biliary duct dilatation is seen.    Gallbladder: The gallbladder appears unremarkable.    Pancreas: The pancreas appears unremarkable.    Spleen: There is an ill-defined somewhat heterogeneous fluid collection in the region of the spleen. This likely represents a postoperative hematoma.    Adrenals: The adrenal glands appear unremarkable.    Kidneys: The kidneys appear unremarkable with no stones cysts masses or hydronephrosis.    Aorta: The visualized abdominal aorta appears unremarkable.    IVC: Unremarkable.    Bowel:    Esophagus: The visualized distal esophagus appears unremarkable.    Stomach: The stomach appears unremarkable.    Duodenum: Unremarkable appearing duodenum.    Small Bowel: The small bowel appears unremarkable.    Colon: Nondistended.    Appendix: The appendix is not identified but no inflammatory changes are seen in the right lower quadrant to suggest appendicitis.    Peritoneum: No intraperitoneal free air or ascites is seen.    Pelvis:    Bladder: The bladder appears unremarkable.    Male:    Prostate gland: The prostate gland appears unremarkable.    Bony structures:    Dorsal Spine: The visualized dorsal spine appears unremarkable.    Bony Pelvis: The visualized bony structures of the pelvis appear unremarkable.  Impression: Impression:    1. There is an ill-defined somewhat heterogeneous fluid collection in the region of the spleen. This likely represents a postoperative hematoma. The possibility of an infectious component is  not excluded although no organized collection is identified to suggest abscess. Follow-up as clinically indicated.    2. No acute intraabdominal or pelvic solid organ or bowel pathology identified. Details and other findings as discussed above.    No significant discrepancy with overnight report.    Electronically signed by: Finesse Paz  Date:    03/31/2024  Time:    08:21      Labs:   Lab Results   Component Value Date    WBC 10.78 03/30/2024    RBC 3.49 (L) 03/30/2024    HGB 10.2 (L) 03/30/2024    HCT 31.0 (L) 03/30/2024    MCV 88.8 03/30/2024    MCH 29.2 03/30/2024     (H) 03/30/2024     03/30/2024    K 3.9 03/30/2024    BUN 20.6 03/30/2024    CREATININE 1.32 (H) 03/30/2024    AST 33 03/30/2024    ALT 41 03/30/2024    BILITOT 0.2 03/30/2024    TSH 1.108 07/11/2023    HGBA1C 5.0 07/10/2023    PSA 0.46 07/10/2023        Laboratory Data:  All pertinent labs have been reviewed.  I have reviewed the following records today:     Details:   [x] Labs [] Internal  [] External    [] Micro [] Internal  [] External    [] Pathology [] Internal  [] External    [x] Imaging [] Internal  [] External    [x] Cardiology Procedures [] Internal  [] External    [x] Provider Records [] Internal  [] External    [] Other [] Internal  [] External      Medications:     Medication List with Changes/Refills   Current Medications    ANASTROZOLE (ARIMIDEX) 1 MG TAB    Take 1 mg by mouth 3 (three) times a week.    BUSPIRONE (BUSPAR) 5 MG TAB    take one tablet by mouth two times daily for anxiety    CABERGOLINE (DOSTINEX) 0.5 MG TABLET    Take 0.5 mg by mouth every 7 days.    CEFDINIR (OMNICEF) 300 MG CAPSULE    Take 1 capsule (300 mg total) by mouth 2 (two) times daily. for 10 days    LISINOPRIL-HYDROCHLOROTHIAZIDE (PRINZIDE,ZESTORETIC) 10-12.5 MG PER TABLET    Take 1 tablet by mouth once daily.    PANTOPRAZOLE (PROTONIX) 40 MG TABLET    Take 1 tablet (40 mg total) by mouth once daily.    TADALAFIL (CIALIS) 20 MG TAB    Take 20 mg  by mouth daily as needed.    TAMOXIFEN (NOLVADEX) 20 MG TAB    Take 20 mg by mouth.    VYVANSE 50 MG CAPSULE    Take 50 mg by mouth every morning.   Discontinued Medications    VYVANSE 40 MG CAP    Take 40 mg by mouth every morning.     Assessment:     1. Hospital discharge follow-up    2. S/P splenectomy    3. Thrombocytosis after splenectomy    4. Hematoma    5. Hepatitis C antibody test positive    6. Elevated serum creatinine      Plan:     Problem List Items Addressed This Visit    None  Visit Diagnoses       Hospital discharge follow-up    -  Primary    S/P splenectomy        Relevant Orders    CBC Auto Differential    Comprehensive Metabolic Panel    Ambulatory referral/consult to General Surgery    Hemoglobin A1C    Thrombocytosis after splenectomy        Relevant Orders    CBC Auto Differential    Comprehensive Metabolic Panel    Ambulatory referral/consult to General Surgery    Hemoglobin A1C    Hematoma        Relevant Orders    Ambulatory referral/consult to General Surgery    Hemoglobin A1C    Hepatitis C antibody test positive        Relevant Orders    Hepatitis C Viral(HCV) RNA, Quant Real-Time PCR w/Reflexs    Hepatitis C Genotype    Elevated serum creatinine        Relevant Orders    Comprehensive Metabolic Panel             1.  Continue current medications  2.  Labs ordered above.  3.  Progress diet as tolerated.  4.  Referral to General surgery recent due to concern for intra-abdominal hematoma.    5.  Strict ED precautions discussed in detail with patient who verbalized understanding and agreement.  6.  Call with increased complaints or concerns      Follow Up:   Follow up in about 2 weeks (around 4/18/2024) for s/p splenectomy and concern for blood sugar.    I spent greater than 30 minutes today both in chart review and greater than 50% of that time in discussion with the patient regarding health maintenance, diagnoses, diagnostic tests, medications, treatments, symptom management, expected  results and adverse effects. Patient verbalized understanding and all questions were answered.    This note includes dictation done on [employer] word recognition program.  There are word recognition mistakes that are occasionally missed on review.

## 2024-04-05 LAB
BACTERIA BLD CULT: NORMAL
BACTERIA BLD CULT: NORMAL
HCV RNA SERPL NAA+PROBE-ACNC: ABNORMAL IU/ML

## 2024-04-08 LAB — HCV GENTYP SERPL NAA+PROBE: ABNORMAL

## 2024-04-09 DIAGNOSIS — R76.8 HEPATITIS C ANTIBODY TEST POSITIVE: Primary | ICD-10-CM

## 2024-04-18 ENCOUNTER — OFFICE VISIT (OUTPATIENT)
Dept: FAMILY MEDICINE | Facility: CLINIC | Age: 34
End: 2024-04-18
Payer: COMMERCIAL

## 2024-04-18 VITALS
RESPIRATION RATE: 20 BRPM | HEART RATE: 66 BPM | DIASTOLIC BLOOD PRESSURE: 81 MMHG | BODY MASS INDEX: 26.86 KG/M2 | OXYGEN SATURATION: 98 % | SYSTOLIC BLOOD PRESSURE: 130 MMHG | HEIGHT: 70 IN | WEIGHT: 187.63 LBS

## 2024-04-18 DIAGNOSIS — F41.9 ANXIETY: ICD-10-CM

## 2024-04-18 DIAGNOSIS — F41.0 PANIC ATTACK: ICD-10-CM

## 2024-04-18 DIAGNOSIS — Z90.81 THROMBOCYTOSIS AFTER SPLENECTOMY: ICD-10-CM

## 2024-04-18 DIAGNOSIS — D64.9 POSTOPERATIVE ANEMIA: ICD-10-CM

## 2024-04-18 DIAGNOSIS — D72.829 LEUKOCYTOSIS, UNSPECIFIED TYPE: ICD-10-CM

## 2024-04-18 DIAGNOSIS — Z90.81 S/P SPLENECTOMY: Primary | ICD-10-CM

## 2024-04-18 DIAGNOSIS — D75.838 THROMBOCYTOSIS AFTER SPLENECTOMY: ICD-10-CM

## 2024-04-18 PROCEDURE — 1159F MED LIST DOCD IN RCRD: CPT | Mod: CPTII,,, | Performed by: FAMILY MEDICINE

## 2024-04-18 PROCEDURE — 3075F SYST BP GE 130 - 139MM HG: CPT | Mod: CPTII,,, | Performed by: FAMILY MEDICINE

## 2024-04-18 PROCEDURE — 3079F DIAST BP 80-89 MM HG: CPT | Mod: CPTII,,, | Performed by: FAMILY MEDICINE

## 2024-04-18 PROCEDURE — 1160F RVW MEDS BY RX/DR IN RCRD: CPT | Mod: CPTII,,, | Performed by: FAMILY MEDICINE

## 2024-04-18 PROCEDURE — 3044F HG A1C LEVEL LT 7.0%: CPT | Mod: CPTII,,, | Performed by: FAMILY MEDICINE

## 2024-04-18 PROCEDURE — 4010F ACE/ARB THERAPY RXD/TAKEN: CPT | Mod: CPTII,,, | Performed by: FAMILY MEDICINE

## 2024-04-18 PROCEDURE — 99214 OFFICE O/P EST MOD 30 MIN: CPT | Mod: ,,, | Performed by: FAMILY MEDICINE

## 2024-04-18 PROCEDURE — 3008F BODY MASS INDEX DOCD: CPT | Mod: CPTII,,, | Performed by: FAMILY MEDICINE

## 2024-04-18 NOTE — PROGRESS NOTES
"Subjective:      Patient ID: Elsie Ahuja is a 33 y.o. male.    Chief Complaint: Follow-up (F/u splenectomy /Bruising and discomfort/SOB approx 1 month /No OTC meds)    Disclaimer:  This note is prepared using voice recognition software and as such is likely to have errors despite attempts at proofreading. Please contact me for questions.       33yoM who presents for 2 weeks follow up. He is s/p splenectomy 2/2 rupture. Patient admits to improvement in pain, also admits to numbness near incision sites. Has had intermittent sharp pain between sternum and umbilicus with palpation.  He admits to vomiting after eating apples however it has resolved since avoiding the fruit. Patient also admits to SOB described as light-headedness when holding his breath and feeling "winded" when talking. Patient had recent US (Monday 04/15/2023) and has follow up with GI as well as EGD on 04/30/2024. Denies fever and having regular bowel movements.  Anxiety - Admits to recent panic attack. He was seen in the ED on 04/04/2024.  Hospital discharge follow-up on the same day and states that after examination and mild palpation he became anxious and concerned that he may have intra-abdominal bleeding.  He states that after going to the ED he recognized the symptoms as a panic attack and he states this was confirmed by the physician who evaluated him.  The patient is prescribed BuSpar 5 mg b.i.d. for anxiety.  He states symptoms have improved and he has not had recurrent panic attacks.      Past Medical History:   Diagnosis Date    ADHD (attention deficit hyperactivity disorder)     Anxiety         Current Outpatient Medications on File Prior to Visit   Medication Sig Dispense Refill    busPIRone (BUSPAR) 5 MG Tab take one tablet by mouth two times daily for anxiety      lisinopriL-hydrochlorothiazide (PRINZIDE,ZESTORETIC) 10-12.5 mg per tablet Take 1 tablet by mouth once daily. 30 tablet 11    tadalafiL (CIALIS) 20 MG Tab Take 20 mg by " "mouth daily as needed.      VYVANSE 50 mg capsule Take 50 mg by mouth every morning.      anastrozole (ARIMIDEX) 1 mg Tab Take 1 mg by mouth 3 (three) times a week. (Patient not taking: Reported on 4/18/2024.)      cabergoline (DOSTINEX) 0.5 mg tablet Take 0.5 mg by mouth every 7 days. (Patient not taking: Reported on 4/18/2024)      pantoprazole (PROTONIX) 40 MG tablet Take 1 tablet (40 mg total) by mouth once daily. 30 tablet 11    [DISCONTINUED] tamoxifen (NOLVADEX) 20 MG Tab Take 20 mg by mouth. (Patient not taking: Reported on 4/18/2024.)       No current facility-administered medications on file prior to visit.        Review of patient's allergies indicates:   Allergen Reactions    Sulfa (sulfonamide antibiotics) Nausea And Vomiting        Lab Results   Component Value Date    WBC 11.92 (H) 04/04/2024    HGB 12.5 (L) 04/04/2024    HCT 38.2 (L) 04/04/2024     (H) 04/04/2024    ALT 38 04/04/2024    AST 31 04/04/2024     04/04/2024    K 4.1 04/04/2024    CREATININE 1.04 04/04/2024    BUN 20.2 04/04/2024    CO2 26 04/04/2024    HGBA1C 4.8 04/04/2024    CALCIUM 9.8 04/04/2024       Review of Systems   Constitutional:  Negative for chills, fever and malaise/fatigue.   Respiratory:  Positive for shortness of breath. Negative for cough and wheezing.    Cardiovascular:  Negative for chest pain.   Gastrointestinal:  Positive for abdominal pain. Negative for constipation, diarrhea, nausea and vomiting (resolved).   Neurological:  Negative for dizziness and headaches.   Psychiatric/Behavioral:  The patient is nervous/anxious.        Objective:     Vitals:    04/18/24 0737   BP: 130/81   BP Location: Right arm   Patient Position: Sitting   BP Method: Large (Automatic)   Pulse: 66   Resp: 20   SpO2: 98%   Weight: 85.1 kg (187 lb 9.6 oz)   Height: 5' 10" (1.778 m)     Physical Exam  Constitutional:       General: He is not in acute distress.     Appearance: Normal appearance. He is not ill-appearing or " toxic-appearing.   HENT:      Head: Normocephalic and atraumatic.      Nose: Nose normal.      Mouth/Throat:      Mouth: Mucous membranes are moist.   Eyes:      Extraocular Movements: Extraocular movements intact.      Conjunctiva/sclera: Conjunctivae normal.   Cardiovascular:      Rate and Rhythm: Normal rate and regular rhythm.      Pulses: Normal pulses.      Heart sounds: Normal heart sounds. No murmur heard.     No gallop.   Pulmonary:      Effort: Pulmonary effort is normal. No respiratory distress.      Breath sounds: Normal breath sounds. No stridor. No wheezing, rhonchi or rales.   Abdominal:      General: Abdomen is flat. Bowel sounds are normal.      Palpations: Abdomen is soft.          Comments: Incision sites well healed, no bleeding, discharge, swelling, or induration.   Musculoskeletal:         General: Normal range of motion.      Cervical back: Normal range of motion.   Skin:     General: Skin is warm and dry.      Coloration: Skin is not pale.   Neurological:      General: No focal deficit present.      Mental Status: He is alert and oriented to person, place, and time.   Psychiatric:         Mood and Affect: Mood normal.         Behavior: Behavior normal.         Thought Content: Thought content normal.         Assessment:     1. S/P splenectomy    2. Anxiety    3. Panic attack    4. Postoperative anemia    5. Thrombocytosis after splenectomy    6. Leukocytosis, unspecified type      Plan:     1. S/P splenectomy  Keep follow up with GI.   Patient awaiting results of abdominal  US, records to be requested.  Strict ED precautions discussed.    2. Anxiety  Stable   Continue Buspar 5 mg BID, medication to be titrated as needed.  Recommend relaxation techniques and coping strategies (i.e. essential oils, deep breathing, exercise, etc).  Seek immediate medical treatment for SOB, persistent panic attack, chest pain, suicidal thoughts or hallucinations.    3. Panic attack  See #2.    4. Postoperative  anemia  - CBC Auto Differential; Future  Improving.  Repeat CBC in 1 month.    5. 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    6. Leukocytosis, unspecified type  - CBC Auto Differential; Future  Mild leukocytosis on recent CBC.  Patient afebrile, pain improving.  Repeat CBC ordered to monitor for resolution.     Follow up in about 22 days (around 5/10/2024) for s/p splenectomy and discuss returning to work with PCP.  Elsie Ahuja was given education on their disease process and medications.

## 2024-04-29 ENCOUNTER — PATIENT MESSAGE (OUTPATIENT)
Dept: ORTHOPEDICS | Facility: CLINIC | Age: 34
End: 2024-04-29

## 2024-04-29 ENCOUNTER — OFFICE VISIT (OUTPATIENT)
Dept: ORTHOPEDICS | Facility: CLINIC | Age: 34
End: 2024-04-29
Payer: COMMERCIAL

## 2024-04-29 ENCOUNTER — HOSPITAL ENCOUNTER (OUTPATIENT)
Dept: RADIOLOGY | Facility: HOSPITAL | Age: 34
Discharge: HOME | End: 2024-04-29
Attending: STUDENT IN AN ORGANIZED HEALTH CARE EDUCATION/TRAINING PROGRAM
Payer: COMMERCIAL

## 2024-04-29 VITALS
WEIGHT: 190 LBS | HEIGHT: 70 IN | DIASTOLIC BLOOD PRESSURE: 83 MMHG | HEART RATE: 73 BPM | SYSTOLIC BLOOD PRESSURE: 126 MMHG | BODY MASS INDEX: 27.2 KG/M2

## 2024-04-29 DIAGNOSIS — G56.01 SEVERE CARPAL TUNNEL SYNDROME, RIGHT: ICD-10-CM

## 2024-04-29 DIAGNOSIS — G56.03 BILATERAL CARPAL TUNNEL SYNDROME: ICD-10-CM

## 2024-04-29 DIAGNOSIS — G56.03 BILATERAL CARPAL TUNNEL SYNDROME: Primary | ICD-10-CM

## 2024-04-29 PROCEDURE — 71046 X-RAY EXAM CHEST 2 VIEWS: CPT | Mod: TC

## 2024-04-29 PROCEDURE — 3008F BODY MASS INDEX DOCD: CPT | Mod: CPTII,,, | Performed by: STUDENT IN AN ORGANIZED HEALTH CARE EDUCATION/TRAINING PROGRAM

## 2024-04-29 PROCEDURE — 99214 OFFICE O/P EST MOD 30 MIN: CPT | Mod: ,,, | Performed by: STUDENT IN AN ORGANIZED HEALTH CARE EDUCATION/TRAINING PROGRAM

## 2024-04-29 PROCEDURE — 3079F DIAST BP 80-89 MM HG: CPT | Mod: CPTII,,, | Performed by: STUDENT IN AN ORGANIZED HEALTH CARE EDUCATION/TRAINING PROGRAM

## 2024-04-29 PROCEDURE — 4010F ACE/ARB THERAPY RXD/TAKEN: CPT | Mod: CPTII,,, | Performed by: STUDENT IN AN ORGANIZED HEALTH CARE EDUCATION/TRAINING PROGRAM

## 2024-04-29 PROCEDURE — 3074F SYST BP LT 130 MM HG: CPT | Mod: CPTII,,, | Performed by: STUDENT IN AN ORGANIZED HEALTH CARE EDUCATION/TRAINING PROGRAM

## 2024-04-29 PROCEDURE — 1159F MED LIST DOCD IN RCRD: CPT | Mod: CPTII,,, | Performed by: STUDENT IN AN ORGANIZED HEALTH CARE EDUCATION/TRAINING PROGRAM

## 2024-04-29 PROCEDURE — 3044F HG A1C LEVEL LT 7.0%: CPT | Mod: CPTII,,, | Performed by: STUDENT IN AN ORGANIZED HEALTH CARE EDUCATION/TRAINING PROGRAM

## 2024-04-29 RX ORDER — SODIUM CHLORIDE 9 MG/ML
INJECTION, SOLUTION INTRAVENOUS CONTINUOUS
Status: CANCELLED | OUTPATIENT
Start: 2024-04-29

## 2024-04-29 NOTE — H&P (VIEW-ONLY)
Chief Complaint:  Bilateral hand numbness and tingling    Consulting Physician: No ref. 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  He states that he is had left worse than right hand numbness for several years but it has gotten worse over the past few months.  In the past when it 1st started he would affect him only at nighttime but has now progressed to throughout the day.  He is states that his hands are completely numb.  Does not have any neck pain.  Does complain of some mild pain in his left axilla but overall the pain that he feels is at the medial aspect of the elbow and at the wrist.  He states that sometimes if he twists the wrist the wrong way he feels a pop in the wrist with stinging and shocking sensations into fingertips.  He states that the thumb is the most affected finger but all 5 fingers are numb in both hands.  He sleeps with carpal tunnel braces which helps some.  He does not have significant weakness.    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  He is recovering from that currently and so he would like to get his carpal tunnel and cubital tunnels addressed during this recovery.  So he does not have to miss additional work.  I sent her for EMG last time and he is here for the results.  The EMG states that he has severe bilateral carpal tunnel syndrome.    Past Medical History:   Diagnosis Date    ADHD (attention deficit hyperactivity disorder)     Anxiety        Past Surgical History:   Procedure Laterality Date    SPLENECTOMY, TOTAL         Current Outpatient Medications   Medication Sig Dispense Refill    busPIRone (BUSPAR) 5 MG Tab take one tablet by mouth two times daily for anxiety      lisinopriL-hydrochlorothiazide (PRINZIDE,ZESTORETIC) 10-12.5 mg per tablet Take 1 tablet by mouth once daily. 30 tablet 11     pantoprazole (PROTONIX) 40 MG tablet Take 1 tablet (40 mg total) by mouth once daily. 30 tablet 11    VYVANSE 50 mg capsule Take 50 mg by mouth every morning.      anastrozole (ARIMIDEX) 1 mg Tab Take 1 mg by mouth 3 (three) times a week. (Patient not taking: Reported on 4/18/2024.)      cabergoline (DOSTINEX) 0.5 mg tablet Take 0.5 mg by mouth every 7 days. (Patient not taking: Reported on 4/18/2024)      tadalafiL (CIALIS) 20 MG Tab Take 20 mg by mouth daily as needed. (Patient not taking: Reported on 4/29/2024)       No current facility-administered medications for this visit.       Review of patient's allergies indicates:   Allergen Reactions    Sulfa (sulfonamide antibiotics) Nausea And Vomiting       Family History   Problem Relation Name Age of Onset    Arthritis Mother Meghann Ahuja     Depression Mother Meghann Ahuja     Hyperlipidemia Mother Mehgannines Ahuja     Hypertension Mother Meghann Ahuja     Arthritis Father Amando Ahuja     Diabetes Father Amando Ahuja     Hearing loss Father Amando Ahuja     Hyperlipidemia Father Amando Ahuja     Hypertension Father Amando Ahuja        Social History     Socioeconomic History    Marital status:    Tobacco Use    Smoking status: Former     Current packs/day: 0.25     Average packs/day: 0.3 packs/day for 5.0 years (1.3 ttl pk-yrs)     Types: Cigarettes    Smokeless tobacco: Never    Tobacco comments:     Smokes socially. Less than 1 pack in 6 months    Substance and Sexual Activity    Alcohol use: Not Currently    Drug use: Never    Sexual activity: Yes     Partners: Female     Birth control/protection: I.U.D.     Social Determinants of Health     Financial Resource Strain: Low Risk  (4/1/2024)    Overall Financial Resource Strain (CARDIA)     Difficulty of Paying Living Expenses: Not very hard   Food Insecurity: No Food Insecurity (4/1/2024)    Hunger Vital Sign     Worried About Running Out of Food in the Last Year: Never true     Ran Out of Food in the Last  "Year: Never true   Transportation Needs: No Transportation Needs (4/1/2024)    PRAPARE - Transportation     Lack of Transportation (Medical): No     Lack of Transportation (Non-Medical): No   Physical Activity: Sufficiently Active (4/1/2024)    Exercise Vital Sign     Days of Exercise per Week: 7 days     Minutes of Exercise per Session: 90 min   Stress: Stress Concern Present (4/1/2024)    [employer] [place] [employer] - Occupational Stress Questionnaire     Feeling of Stress : To some extent   Social Connections: Unknown (4/1/2024)    Social Connection and Isolation Panel [NHANES]     Frequency of Communication with Friends and Family: More than three times a week     Frequency of Social Gatherings with Friends and Family: Once a week     Active Member of Clubs or Organizations: No     Attends Club or Organization Meetings: Never     Marital Status:    Housing Stability: Low Risk  (4/1/2024)    Housing Stability Vital Sign     Unable to Pay for Housing in the Last Year: No     Number of Places Lived in the Last Year: 1     Unstable Housing in the Last Year: No       Review of Systems:    Constitution:   Denies chills, fever, and sweats.  HENT:   Denies headaches or blurry vision.  Cardiovascular:  Denies chest pain or irregular heart beat.  Respiratory:   Denies cough or shortness of breath.  Gastrointestinal:  Denies abdominal pain, nausea, or vomiting.  Musculoskeletal:   Denies muscle cramps.  Neurological:   Denies dizziness or focal weakness.  Psychiatric/Behavior: Normal mental status.  Hematology/Lymph:  Denies bleeding problem or easy bruising/bleeding.  Skin:    Denies rash or suspicious lesions.    Examination:    Vital Signs:    Vitals:    04/29/24 0801   BP: 126/83   Pulse: 73   Weight: 86.2 kg (190 lb)   Height: 5' 10" (1.778 m)       Body mass index is 27.26 kg/m².    Constitution:   Well-developed, well nourished patient in no acute distress.  Neurological:   Alert and oriented x 3 " and cooperative to examination.     Psychiatric/Behavior: Normal mental status.  Respiratory:   No shortness of breath.  Eyes:    Extraoccular muscles intact  Skin:    No scars, rash or suspicious lesions.    MSK:   Left upper extremity: No open wounds or rashes.  Full range of motion of the shoulder, elbow, wrist, hand.  Spurling is negative.  Tinel is positive over the ulnar nerve at the elbow as well as the median nerve at the wrist.  Phalen's and Durkan's tests are positive.  Flexion and compression at the elbow reproduces the numbness in the ring and small finger.  Two-point discrimination is greater than 15 in the thumb index and middle fingers.  Two-point discrimination is 10 in the ring and small fingers.  Radial pulses 2+.  Apb strength is 5/5.  First dorsal interosseous strength is 5/5.  Hand is warm well perfused    Right upper extremity: No open wounds or rashes.  Full range of motion of the shoulder, elbow, wrist, hand.  Spurling is negative.  Tinel is positive over the ulnar nerve at the elbow as well as the median nerve at the wrist.  Phalen's and Durkan's tests are positive.  Flexion and compression at the elbow reproduces the numbness in the ring and small finger.  Two-point discrimination is greater than 15 in the thumb index and middle fingers.  Two-point discrimination is 10 in the ring and small fingers.  Radial pulses 2+.  Apb strength is 5/5.  First dorsal interosseous strength is 5/5.  Hand is warm well perfused    Imaging:   X-ray of the left hand shows no fractures or dislocations  X-ray of the right hand shows no fractures or dislocations     Assessment:  Bilateral carpal tunnel syndrome, bilateral cubital tunnel syndrome    Plan:  He has severe carpal tunnel syndrome and I think he would benefit from carpal tunnel release.  I will also discuss cubital tunnel release.  He states that his little finger does go numb especially when he was working.  Clinically I also think he has cubital  tunnel syndrome.  We will also release his cubital tunnel when we release the carpal tunnel.  I will book him for 05/07/2024.    I explained that surgery and the nature of their condition are not without risks. These include, but are not limited to, bleeding, infection, neurovascular compromise, wound complications, scarring, cosmetic defects, need for later and/or repeated surgeries, pain, loss of ROM, loss of function, deformity, functional abnormalities, stiffness, thromboembolic complications, compartment syndrome, loss of limb, loss of life, anesthetic complications, and other imponderables. I explained that these can occur despite the adequacy of treatments rendered, and that their risks are heightened given the nature of their condition. They verbalized understanding. They would like to continue with surgery at this time. If appropriate family was involved with surgical discussion.     Follow Up:  after surgery  Xray at next visit:  None

## 2024-04-29 NOTE — PROGRESS NOTES
Chief Complaint:  Bilateral hand numbness and tingling    Consulting Physician: No ref. 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  He states that he is had left worse than right hand numbness for several years but it has gotten worse over the past few months.  In the past when it 1st started he would affect him only at nighttime but has now progressed to throughout the day.  He is states that his hands are completely numb.  Does not have any neck pain.  Does complain of some mild pain in his left axilla but overall the pain that he feels is at the medial aspect of the elbow and at the wrist.  He states that sometimes if he twists the wrist the wrong way he feels a pop in the wrist with stinging and shocking sensations into fingertips.  He states that the thumb is the most affected finger but all 5 fingers are numb in both hands.  He sleeps with carpal tunnel braces which helps some.  He does not have significant weakness.    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  He is recovering from that currently and so he would like to get his carpal tunnel and cubital tunnels addressed during this recovery.  So he does not have to miss additional work.  I sent her for EMG last time and he is here for the results.  The EMG states that he has severe bilateral carpal tunnel syndrome.    Past Medical History:   Diagnosis Date    ADHD (attention deficit hyperactivity disorder)     Anxiety        Past Surgical History:   Procedure Laterality Date    SPLENECTOMY, TOTAL         Current Outpatient Medications   Medication Sig Dispense Refill    busPIRone (BUSPAR) 5 MG Tab take one tablet by mouth two times daily for anxiety      lisinopriL-hydrochlorothiazide (PRINZIDE,ZESTORETIC) 10-12.5 mg per tablet Take 1 tablet by mouth once daily. 30 tablet 11     pantoprazole (PROTONIX) 40 MG tablet Take 1 tablet (40 mg total) by mouth once daily. 30 tablet 11    VYVANSE 50 mg capsule Take 50 mg by mouth every morning.      anastrozole (ARIMIDEX) 1 mg Tab Take 1 mg by mouth 3 (three) times a week. (Patient not taking: Reported on 4/18/2024.)      cabergoline (DOSTINEX) 0.5 mg tablet Take 0.5 mg by mouth every 7 days. (Patient not taking: Reported on 4/18/2024)      tadalafiL (CIALIS) 20 MG Tab Take 20 mg by mouth daily as needed. (Patient not taking: Reported on 4/29/2024)       No current facility-administered medications for this visit.       Review of patient's allergies indicates:   Allergen Reactions    Sulfa (sulfonamide antibiotics) Nausea And Vomiting       Family History   Problem Relation Name Age of Onset    Arthritis Mother Meghann Ahuja     Depression Mother Meghann Ahuja     Hyperlipidemia Mother Meghannines Ahuja     Hypertension Mother Meghann Ahuja     Arthritis Father Amando Ahuja     Diabetes Father Amando Ahuja     Hearing loss Father Amando Ahuja     Hyperlipidemia Father Amando Ahuja     Hypertension Father Amando Ahuja        Social History     Socioeconomic History    Marital status:    Tobacco Use    Smoking status: Former     Current packs/day: 0.25     Average packs/day: 0.3 packs/day for 5.0 years (1.3 ttl pk-yrs)     Types: Cigarettes    Smokeless tobacco: Never    Tobacco comments:     Smokes socially. Less than 1 pack in 6 months    Substance and Sexual Activity    Alcohol use: Not Currently    Drug use: Never    Sexual activity: Yes     Partners: Female     Birth control/protection: I.U.D.     Social Determinants of Health     Financial Resource Strain: Low Risk  (4/1/2024)    Overall Financial Resource Strain (CARDIA)     Difficulty of Paying Living Expenses: Not very hard   Food Insecurity: No Food Insecurity (4/1/2024)    Hunger Vital Sign     Worried About Running Out of Food in the Last Year: Never true     Ran Out of Food in the Last  "Year: Never true   Transportation Needs: No Transportation Needs (4/1/2024)    PRAPARE - Transportation     Lack of Transportation (Medical): No     Lack of Transportation (Non-Medical): No   Physical Activity: Sufficiently Active (4/1/2024)    Exercise Vital Sign     Days of Exercise per Week: 7 days     Minutes of Exercise per Session: 90 min   Stress: Stress Concern Present (4/1/2024)    [employer] [place] [employer] - Occupational Stress Questionnaire     Feeling of Stress : To some extent   Social Connections: Unknown (4/1/2024)    Social Connection and Isolation Panel [NHANES]     Frequency of Communication with Friends and Family: More than three times a week     Frequency of Social Gatherings with Friends and Family: Once a week     Active Member of Clubs or Organizations: No     Attends Club or Organization Meetings: Never     Marital Status:    Housing Stability: Low Risk  (4/1/2024)    Housing Stability Vital Sign     Unable to Pay for Housing in the Last Year: No     Number of Places Lived in the Last Year: 1     Unstable Housing in the Last Year: No       Review of Systems:    Constitution:   Denies chills, fever, and sweats.  HENT:   Denies headaches or blurry vision.  Cardiovascular:  Denies chest pain or irregular heart beat.  Respiratory:   Denies cough or shortness of breath.  Gastrointestinal:  Denies abdominal pain, nausea, or vomiting.  Musculoskeletal:   Denies muscle cramps.  Neurological:   Denies dizziness or focal weakness.  Psychiatric/Behavior: Normal mental status.  Hematology/Lymph:  Denies bleeding problem or easy bruising/bleeding.  Skin:    Denies rash or suspicious lesions.    Examination:    Vital Signs:    Vitals:    04/29/24 0801   BP: 126/83   Pulse: 73   Weight: 86.2 kg (190 lb)   Height: 5' 10" (1.778 m)       Body mass index is 27.26 kg/m².    Constitution:   Well-developed, well nourished patient in no acute distress.  Neurological:   Alert and oriented x 3 " and cooperative to examination.     Psychiatric/Behavior: Normal mental status.  Respiratory:   No shortness of breath.  Eyes:    Extraoccular muscles intact  Skin:    No scars, rash or suspicious lesions.    MSK:   Left upper extremity: No open wounds or rashes.  Full range of motion of the shoulder, elbow, wrist, hand.  Spurling is negative.  Tinel is positive over the ulnar nerve at the elbow as well as the median nerve at the wrist.  Phalen's and Durkan's tests are positive.  Flexion and compression at the elbow reproduces the numbness in the ring and small finger.  Two-point discrimination is greater than 15 in the thumb index and middle fingers.  Two-point discrimination is 10 in the ring and small fingers.  Radial pulses 2+.  Apb strength is 5/5.  First dorsal interosseous strength is 5/5.  Hand is warm well perfused    Right upper extremity: No open wounds or rashes.  Full range of motion of the shoulder, elbow, wrist, hand.  Spurling is negative.  Tinel is positive over the ulnar nerve at the elbow as well as the median nerve at the wrist.  Phalen's and Durkan's tests are positive.  Flexion and compression at the elbow reproduces the numbness in the ring and small finger.  Two-point discrimination is greater than 15 in the thumb index and middle fingers.  Two-point discrimination is 10 in the ring and small fingers.  Radial pulses 2+.  Apb strength is 5/5.  First dorsal interosseous strength is 5/5.  Hand is warm well perfused    Imaging:   X-ray of the left hand shows no fractures or dislocations  X-ray of the right hand shows no fractures or dislocations     Assessment:  Bilateral carpal tunnel syndrome, bilateral cubital tunnel syndrome    Plan:  He has severe carpal tunnel syndrome and I think he would benefit from carpal tunnel release.  I will also discuss cubital tunnel release.  He states that his little finger does go numb especially when he was working.  Clinically I also think he has cubital  tunnel syndrome.  We will also release his cubital tunnel when we release the carpal tunnel.  I will book him for 05/07/2024.    I explained that surgery and the nature of their condition are not without risks. These include, but are not limited to, bleeding, infection, neurovascular compromise, wound complications, scarring, cosmetic defects, need for later and/or repeated surgeries, pain, loss of ROM, loss of function, deformity, functional abnormalities, stiffness, thromboembolic complications, compartment syndrome, loss of limb, loss of life, anesthetic complications, and other imponderables. I explained that these can occur despite the adequacy of treatments rendered, and that their risks are heightened given the nature of their condition. They verbalized understanding. They would like to continue with surgery at this time. If appropriate family was involved with surgical discussion.     Follow Up:  after surgery  Xray at next visit:  None

## 2024-04-30 ENCOUNTER — ANESTHESIA EVENT (OUTPATIENT)
Dept: SURGERY | Facility: HOSPITAL | Age: 34
End: 2024-04-30
Payer: COMMERCIAL

## 2024-04-30 ENCOUNTER — TELEPHONE (OUTPATIENT)
Dept: ORTHOPEDICS | Facility: CLINIC | Age: 34
End: 2024-04-30
Payer: COMMERCIAL

## 2024-04-30 NOTE — TELEPHONE ENCOUNTER
Elsie sent a message through the [facility] portal requesting to change the laterality of his surgery. Patient wants to do left carpal and cubital tunnel release.     I spoke to Dr. Palacio who agreed to changing the laterality.     I called Sarahy in surgery scheduling and had her change the case request to left carpal and cubital tunnel release. Felicity Hahn PA-C is going to get a new consent signed by the patient the morning of surgery.

## 2024-05-02 ENCOUNTER — OFFICE VISIT (OUTPATIENT)
Dept: FAMILY MEDICINE | Facility: CLINIC | Age: 34
End: 2024-05-02
Payer: COMMERCIAL

## 2024-05-02 VITALS
BODY MASS INDEX: 27.39 KG/M2 | HEART RATE: 65 BPM | SYSTOLIC BLOOD PRESSURE: 138 MMHG | HEIGHT: 70 IN | RESPIRATION RATE: 16 BRPM | OXYGEN SATURATION: 99 % | TEMPERATURE: 99 F | WEIGHT: 191.31 LBS | DIASTOLIC BLOOD PRESSURE: 78 MMHG

## 2024-05-02 DIAGNOSIS — Z00.00 WELLNESS EXAMINATION: ICD-10-CM

## 2024-05-02 DIAGNOSIS — G56.00 CARPAL TUNNEL SYNDROME, UNSPECIFIED LATERALITY: ICD-10-CM

## 2024-05-02 DIAGNOSIS — I10 PRIMARY HYPERTENSION: Primary | ICD-10-CM

## 2024-05-02 DIAGNOSIS — Z90.81 S/P SPLENECTOMY: ICD-10-CM

## 2024-05-02 DIAGNOSIS — B19.20 HEPATITIS C VIRUS INFECTION WITHOUT HEPATIC COMA, UNSPECIFIED CHRONICITY: ICD-10-CM

## 2024-05-02 PROBLEM — R03.0 ELEVATED BLOOD PRESSURE READING: Status: RESOLVED | Noted: 2023-07-10 | Resolved: 2024-05-02

## 2024-05-02 PROCEDURE — 3075F SYST BP GE 130 - 139MM HG: CPT | Mod: CPTII,,, | Performed by: FAMILY MEDICINE

## 2024-05-02 PROCEDURE — 3078F DIAST BP <80 MM HG: CPT | Mod: CPTII,,, | Performed by: FAMILY MEDICINE

## 2024-05-02 PROCEDURE — 3044F HG A1C LEVEL LT 7.0%: CPT | Mod: CPTII,,, | Performed by: FAMILY MEDICINE

## 2024-05-02 PROCEDURE — 3008F BODY MASS INDEX DOCD: CPT | Mod: CPTII,,, | Performed by: FAMILY MEDICINE

## 2024-05-02 PROCEDURE — 1159F MED LIST DOCD IN RCRD: CPT | Mod: CPTII,,, | Performed by: FAMILY MEDICINE

## 2024-05-02 PROCEDURE — 4010F ACE/ARB THERAPY RXD/TAKEN: CPT | Mod: CPTII,,, | Performed by: FAMILY MEDICINE

## 2024-05-02 PROCEDURE — 1160F RVW MEDS BY RX/DR IN RCRD: CPT | Mod: CPTII,,, | Performed by: FAMILY MEDICINE

## 2024-05-02 PROCEDURE — 99214 OFFICE O/P EST MOD 30 MIN: CPT | Mod: ,,, | Performed by: FAMILY MEDICINE

## 2024-05-02 NOTE — ASSESSMENT & PLAN NOTE
Will request records from hospital stay 3/2024. Believes he got pneumonia vaccine while in hospital in [place]. Will verify records.  Healed well. Abd ct 3/30/24 reviewed.  Keep appts with GI.

## 2024-05-02 NOTE — PROGRESS NOTES
Subjective:        Patient ID: Elsie Ahuja is a 33 y.o. male.    Chief Complaint: Follow-up (Follow up from splenectomy and discuss work release )      Patient presents to clinic unaccompanied for c/o hernia issues.  He is due for his wellness visit in march    He is s/p splenectomy 2/2 rupture in [place] 3/2024. No trauma or injury. Not sure why it happened.  Got pneumonia shot while inpatient.  Staples removed 4/2024. Still having slight discomfort between belly button and incision.  Patient had US (Monday 04/15/2023) and has follow up with GI (Dr. Balderas).  Had EGD on 04/30/2024. Was good. Is on ppi.   Will be starting treatment for hep c with him.  Denies fever and having regular bowel movements.   He is an .   He has not been at work since.  Does not have any paperwork for me to complete.  Plans on returning to work after his carpal tunnel surgery.     Has planned carpal tunnel surgery planned with dr. Palacio.              Hep c screening was positive in 3/2023.      He has htn. Reports compliance with meds.       has gerd.  On ppi.       He has anxiety and adhd.  Is on vyvanse and buspar.  Follows with psych, dr. Christy mejia.  Sees her q3 months.       He follows with dr. Momin for hormones.  Is no longer seeing him so not taking meds.   Will establish with urology     He is not allergic to any medications. He smokes cigarettes- has not smoked in a while.        Has family history of diabetes in both parents. No family history of colon cancer. Is in [employer].            Review of Systems   Constitutional: Negative.    HENT: Negative.     Eyes: Negative.    Respiratory: Negative.     Cardiovascular: Negative.    Gastrointestinal: Negative.    Endocrine: Negative.    Genitourinary: Negative.    Musculoskeletal: Negative.    Skin: Negative.    Allergic/Immunologic: Negative.    Neurological: Negative.    Hematological: Negative.    Psychiatric/Behavioral: Negative.     All other systems  "reviewed and are negative.        Review of patient's allergies indicates:   Allergen Reactions    Sulfa (sulfonamide antibiotics) Nausea And Vomiting and Nausea Only      Vitals:    05/02/24 1121   BP: 138/78   BP Location: Left arm   Pulse: 65   Resp: 16   Temp: 98.9 °F (37.2 °C)   TempSrc: Temporal   SpO2: 99%   Weight: 86.8 kg (191 lb 4.8 oz)   Height: 5' 10" (1.778 m)      Social History     Socioeconomic History    Marital status:    Tobacco Use    Smoking status: Former     Current packs/day: 0.25     Average packs/day: 0.3 packs/day for 5.0 years (1.3 ttl pk-yrs)     Types: Cigarettes    Smokeless tobacco: Never    Tobacco comments:     Smokes socially. Less than 1 pack in 6 months    Substance and Sexual Activity    Alcohol use: Not Currently    Drug use: Never    Sexual activity: Yes     Partners: Female     Birth control/protection: I.U.D.     Social Determinants of Health     Financial Resource Strain: Low Risk  (4/1/2024)    Overall Financial Resource Strain (CARDIA)     Difficulty of Paying Living Expenses: Not very hard   Food Insecurity: No Food Insecurity (4/1/2024)    Hunger Vital Sign     Worried About Running Out of Food in the Last Year: Never true     Ran Out of Food in the Last Year: Never true   Transportation Needs: No Transportation Needs (4/1/2024)    PRAPARE - Transportation     Lack of Transportation (Medical): No     Lack of Transportation (Non-Medical): No   Physical Activity: Sufficiently Active (4/1/2024)    Exercise Vital Sign     Days of Exercise per Week: 7 days     Minutes of Exercise per Session: 90 min   Stress: Stress Concern Present (4/1/2024)    [employer] - Occupational Stress Questionnaire     Feeling of Stress : To some extent   Housing Stability: Low Risk  (4/1/2024)    Housing Stability Vital Sign     Unable to Pay for Housing in the Last Year: No     Number of Places Lived in the Last Year: 1     Unstable Housing in the Last Year: " No      Family History   Problem Relation Name Age of Onset    Arthritis Mother Meghann Ahuja     Depression Mother Meghann Ahuja     Hyperlipidemia Mother Meghann Ahuja     Hypertension Mother Meghann Ahuja     Arthritis Father Amando Ahuja     Diabetes Father Amando Ahuja     Hearing loss Father Amando Ahuja     Hyperlipidemia Father Amando Ahuja     Hypertension Father Amando Ahuja           Objective:     Physical Exam  Vitals and nursing note reviewed.   Constitutional:       Appearance: Normal appearance. He is normal weight.   HENT:      Head: Normocephalic.      Nose: Nose normal.      Mouth/Throat:      Mouth: Mucous membranes are moist.      Pharynx: Oropharynx is clear.   Eyes:      Extraocular Movements: Extraocular movements intact.   Cardiovascular:      Rate and Rhythm: Normal rate and regular rhythm.   Pulmonary:      Effort: Pulmonary effort is normal. No accessory muscle usage, respiratory distress or retractions.      Breath sounds: Normal breath sounds.   Abdominal:          Comments: Well healed surgical scars.    Musculoskeletal:         General: Normal range of motion.   Skin:     General: Skin is warm and dry.   Neurological:      General: No focal deficit present.      Mental Status: He is alert and oriented to person, place, and time. Mental status is at baseline.   Psychiatric:         Mood and Affect: Mood normal.       Current Outpatient Medications on File Prior to Visit   Medication Sig Dispense Refill    busPIRone (BUSPAR) 5 MG Tab take one tablet by mouth two times daily for anxiety      lisinopriL-hydrochlorothiazide (PRINZIDE,ZESTORETIC) 10-12.5 mg per tablet Take 1 tablet by mouth once daily. 30 tablet 11    VYVANSE 50 mg capsule Take 50 mg by mouth every morning.      pantoprazole (PROTONIX) 40 MG tablet Take 1 tablet (40 mg total) by mouth once daily. 30 tablet 11    [DISCONTINUED] anastrozole (ARIMIDEX) 1 mg Tab Take 1 mg by mouth 3 (three) times a week. (Patient not taking:  Reported on 4/18/2024.)      [DISCONTINUED] cabergoline (DOSTINEX) 0.5 mg tablet Take 0.5 mg by mouth every 7 days. (Patient not taking: Reported on 4/18/2024)      [DISCONTINUED] tadalafiL (CIALIS) 20 MG Tab Take 20 mg by mouth daily as needed. (Patient not taking: Reported on 4/29/2024)       No current facility-administered medications on file prior to visit.     Health Maintenance   Topic Date Due    Lipid Panel  03/08/2028    TETANUS VACCINE  02/02/2033    Hepatitis C Screening  Completed      Results for orders placed or performed in visit on 04/29/24   Comprehensive metabolic panel   Result Value Ref Range    Sodium Level 139 136 - 145 mmol/L    Potassium Level 4.1 3.5 - 5.1 mmol/L    Chloride 100 98 - 107 mmol/L    Carbon Dioxide 30 (H) 22 - 29 mmol/L    Glucose Level 95 74 - 100 mg/dL    Blood Urea Nitrogen 23.5 (H) 8.9 - 20.6 mg/dL    Creatinine 0.96 0.73 - 1.18 mg/dL    Calcium Level Total 9.8 8.4 - 10.2 mg/dL    Protein Total 7.6 6.4 - 8.3 gm/dL    Albumin Level 4.2 3.5 - 5.0 g/dL    Globulin 3.4 2.4 - 3.5 gm/dL    Albumin/Globulin Ratio 1.2 1.1 - 2.0 ratio    Bilirubin Total 0.5 <=1.5 mg/dL    Alkaline Phosphatase 99 40 - 150 unit/L    Alanine Aminotransferase 53 0 - 55 unit/L    Aspartate Aminotransferase 42 (H) 5 - 34 unit/L    eGFR >60 mls/min/1.73/m2   CBC with Differential   Result Value Ref Range    WBC 10.00 4.50 - 11.50 x10(3)/mcL    RBC 5.14 4.70 - 6.10 x10(6)/mcL    Hgb 14.7 14.0 - 18.0 g/dL    Hct 44.8 42.0 - 52.0 %    MCV 87.2 80.0 - 94.0 fL    MCH 28.6 27.0 - 31.0 pg    MCHC 32.8 (L) 33.0 - 36.0 g/dL    RDW 13.1 11.5 - 17.0 %    Platelet 490 (H) 130 - 400 x10(3)/mcL    MPV 9.7 7.4 - 10.4 fL    Neut % 67.5 %    Lymph % 18.7 %    Mono % 9.0 %    Eos % 3.3 %    Basophil % 1.0 %    Lymph # 1.87 0.6 - 4.6 x10(3)/mcL    Neut # 6.75 2.1 - 9.2 x10(3)/mcL    Mono # 0.90 0.1 - 1.3 x10(3)/mcL    Eos # 0.33 0 - 0.9 x10(3)/mcL    Baso # 0.10 <=0.2 x10(3)/mcL    IG# 0.05 (H) 0 - 0.04 x10(3)/mcL    IG%  0.5 %    NRBC% 0.0 %          Assessment & Plan:     Active Problem List with Overview Notes    Diagnosis Date Noted    Hypertension 05/02/2024    S/P splenectomy 05/02/2024    Carpal tunnel syndrome     Hepatitis C 07/10/2023    Nocturia 07/10/2023    Hernia of abdominal wall 07/10/2023    Screen for STD (sexually transmitted disease) 07/10/2023    Cigarette smoker 03/07/2023    Family history of diabetes mellitus 03/07/2023    Onychomycosis 03/07/2023    Anxiety     ADHD (attention deficit hyperactivity disorder)     Opioid abuse 01/26/2023       1. Primary hypertension  Assessment & Plan:  Well controlled on current prescription med.     Orders:  -     CBC Auto Differential; Future; Expected date: 03/02/2025  -     Comprehensive Metabolic Panel; Future; Expected date: 03/02/2025  -     Lipid Panel; Future; Expected date: 03/02/2025  -     TSH; Future; Expected date: 03/02/2025  -     Urinalysis; Future; Expected date: 03/02/2025    2. S/P splenectomy  Assessment & Plan:  Will request records from hospital stay 3/2024. Believes he got pneumonia vaccine while in hospital in [place]. Will verify records.  Healed well. Abd ct 3/30/24 reviewed.  Keep appts with GI.     Orders:  -     CBC Auto Differential; Future; Expected date: 03/02/2025  -     Comprehensive Metabolic Panel; Future; Expected date: 03/02/2025  -     Lipid Panel; Future; Expected date: 03/02/2025  -     TSH; Future; Expected date: 03/02/2025  -     Urinalysis; Future; Expected date: 03/02/2025    3. Hepatitis C virus infection without hepatic coma, unspecified chronicity  Assessment & Plan:  Treatment planned with Dr. Balderas.     Orders:  -     CBC Auto Differential; Future; Expected date: 03/02/2025  -     Comprehensive Metabolic Panel; Future; Expected date: 03/02/2025  -     Lipid Panel; Future; Expected date: 03/02/2025  -     TSH; Future; Expected date: 03/02/2025  -     Urinalysis; Future; Expected date: 03/02/2025    4. Carpal tunnel syndrome,  unspecified laterality  Assessment & Plan:  Planned surgery with dr. Palacio 5/2024    Orders:  -     CBC Auto Differential; Future; Expected date: 03/02/2025  -     Comprehensive Metabolic Panel; Future; Expected date: 03/02/2025  -     Lipid Panel; Future; Expected date: 03/02/2025  -     TSH; Future; Expected date: 03/02/2025  -     Urinalysis; Future; Expected date: 03/02/2025    5. Wellness examination  -     CBC Auto Differential; Future; Expected date: 03/02/2025  -     Comprehensive Metabolic Panel; Future; Expected date: 03/02/2025  -     Lipid Panel; Future; Expected date: 03/02/2025  -     TSH; Future; Expected date: 03/02/2025  -     Urinalysis; Future; Expected date: 03/02/2025         Follow up in about 10 months (around 3/2/2025) for Wellness with Labs.

## 2024-05-07 ENCOUNTER — ANESTHESIA (OUTPATIENT)
Dept: SURGERY | Facility: HOSPITAL | Age: 34
End: 2024-05-07
Payer: COMMERCIAL

## 2024-05-07 ENCOUNTER — HOSPITAL ENCOUNTER (OUTPATIENT)
Facility: HOSPITAL | Age: 34
Discharge: HOME | End: 2024-05-07
Attending: STUDENT IN AN ORGANIZED HEALTH CARE EDUCATION/TRAINING PROGRAM | Admitting: STUDENT IN AN ORGANIZED HEALTH CARE EDUCATION/TRAINING PROGRAM
Payer: COMMERCIAL

## 2024-05-07 VITALS
OXYGEN SATURATION: 98 % | HEART RATE: 67 BPM | TEMPERATURE: 97 F | SYSTOLIC BLOOD PRESSURE: 117 MMHG | RESPIRATION RATE: 17 BRPM | DIASTOLIC BLOOD PRESSURE: 63 MMHG | WEIGHT: 187.38 LBS | HEIGHT: 70 IN | BODY MASS INDEX: 26.82 KG/M2

## 2024-05-07 DIAGNOSIS — G89.18 POST-OP PAIN: Primary | ICD-10-CM

## 2024-05-07 DIAGNOSIS — G56.03 BILATERAL CARPAL TUNNEL SYNDROME: ICD-10-CM

## 2024-05-07 DIAGNOSIS — G56.01 SEVERE CARPAL TUNNEL SYNDROME, RIGHT: ICD-10-CM

## 2024-05-07 PROCEDURE — 64415 NJX AA&/STRD BRCH PLXS IMG: CPT | Performed by: ANESTHESIOLOGY

## 2024-05-07 PROCEDURE — 36000706: Performed by: STUDENT IN AN ORGANIZED HEALTH CARE EDUCATION/TRAINING PROGRAM

## 2024-05-07 PROCEDURE — 37000009 HC ANESTHESIA EA ADD 15 MINS: Performed by: STUDENT IN AN ORGANIZED HEALTH CARE EDUCATION/TRAINING PROGRAM

## 2024-05-07 PROCEDURE — 25000003 PHARM REV CODE 250: Performed by: ANESTHESIOLOGY

## 2024-05-07 PROCEDURE — D9220A PRA ANESTHESIA: Mod: CRNA,,, | Performed by: NURSE ANESTHETIST, CERTIFIED REGISTERED

## 2024-05-07 PROCEDURE — 37000008 HC ANESTHESIA 1ST 15 MINUTES: Performed by: STUDENT IN AN ORGANIZED HEALTH CARE EDUCATION/TRAINING PROGRAM

## 2024-05-07 PROCEDURE — 63600175 PHARM REV CODE 636 W HCPCS: Performed by: ANESTHESIOLOGY

## 2024-05-07 PROCEDURE — 29999 UNLISTED PX ARTHROSCOPY: CPT | Mod: ,,, | Performed by: STUDENT IN AN ORGANIZED HEALTH CARE EDUCATION/TRAINING PROGRAM

## 2024-05-07 PROCEDURE — 63600175 PHARM REV CODE 636 W HCPCS: Performed by: NURSE ANESTHETIST, CERTIFIED REGISTERED

## 2024-05-07 PROCEDURE — 36000707: Performed by: STUDENT IN AN ORGANIZED HEALTH CARE EDUCATION/TRAINING PROGRAM

## 2024-05-07 PROCEDURE — D9220A PRA ANESTHESIA: Mod: ANES,,, | Performed by: ANESTHESIOLOGY

## 2024-05-07 PROCEDURE — 71000016 HC POSTOP RECOV ADDL HR: Performed by: STUDENT IN AN ORGANIZED HEALTH CARE EDUCATION/TRAINING PROGRAM

## 2024-05-07 PROCEDURE — 64721 CARPAL TUNNEL SURGERY: CPT | Mod: 51,RT,, | Performed by: STUDENT IN AN ORGANIZED HEALTH CARE EDUCATION/TRAINING PROGRAM

## 2024-05-07 PROCEDURE — 64415 NJX AA&/STRD BRCH PLXS IMG: CPT | Mod: 59,LT,, | Performed by: ANESTHESIOLOGY

## 2024-05-07 PROCEDURE — 99499 UNLISTED E&M SERVICE: CPT | Mod: ,,,

## 2024-05-07 PROCEDURE — 25000003 PHARM REV CODE 250: Performed by: NURSE ANESTHETIST, CERTIFIED REGISTERED

## 2024-05-07 PROCEDURE — 71000015 HC POSTOP RECOV 1ST HR: Performed by: STUDENT IN AN ORGANIZED HEALTH CARE EDUCATION/TRAINING PROGRAM

## 2024-05-07 RX ORDER — BUPIVACAINE HYDROCHLORIDE 2.5 MG/ML
INJECTION, SOLUTION EPIDURAL; INFILTRATION; INTRACAUDAL
Status: DISCONTINUED
Start: 2024-05-07 | End: 2024-05-07 | Stop reason: WASHOUT

## 2024-05-07 RX ORDER — HYDROCODONE BITARTRATE AND ACETAMINOPHEN 5; 325 MG/1; MG/1
1 TABLET ORAL EVERY 4 HOURS PRN
Status: DISCONTINUED | OUTPATIENT
Start: 2024-05-07 | End: 2024-05-07 | Stop reason: HOSPADM

## 2024-05-07 RX ORDER — MORPHINE SULFATE 4 MG/ML
4 INJECTION, SOLUTION INTRAMUSCULAR; INTRAVENOUS
Status: DISCONTINUED | OUTPATIENT
Start: 2024-05-07 | End: 2024-05-07 | Stop reason: HOSPADM

## 2024-05-07 RX ORDER — ALUMINUM HYDROXIDE, MAGNESIUM HYDROXIDE, AND SIMETHICONE 1200; 120; 1200 MG/30ML; MG/30ML; MG/30ML
30 SUSPENSION ORAL EVERY 6 HOURS PRN
Status: DISCONTINUED | OUTPATIENT
Start: 2024-05-07 | End: 2024-05-07 | Stop reason: HOSPADM

## 2024-05-07 RX ORDER — HYDROMORPHONE HYDROCHLORIDE 2 MG/ML
0.2 INJECTION, SOLUTION INTRAMUSCULAR; INTRAVENOUS; SUBCUTANEOUS EVERY 5 MIN PRN
Status: CANCELLED | OUTPATIENT
Start: 2024-05-07

## 2024-05-07 RX ORDER — ACETAMINOPHEN 500 MG
1000 TABLET ORAL
Status: COMPLETED | OUTPATIENT
Start: 2024-05-07 | End: 2024-05-07

## 2024-05-07 RX ORDER — MIDAZOLAM HYDROCHLORIDE 1 MG/ML
INJECTION INTRAMUSCULAR; INTRAVENOUS
Status: DISCONTINUED | OUTPATIENT
Start: 2024-05-07 | End: 2024-05-07

## 2024-05-07 RX ORDER — MIDAZOLAM HYDROCHLORIDE 2 MG/2ML
2 INJECTION, SOLUTION INTRAMUSCULAR; INTRAVENOUS ONCE AS NEEDED
Status: COMPLETED | OUTPATIENT
Start: 2024-05-07 | End: 2024-05-07

## 2024-05-07 RX ORDER — SODIUM CHLORIDE 9 MG/ML
INJECTION, SOLUTION INTRAVENOUS CONTINUOUS
Status: DISCONTINUED | OUTPATIENT
Start: 2024-05-07 | End: 2024-05-07 | Stop reason: HOSPADM

## 2024-05-07 RX ORDER — LIDOCAINE HYDROCHLORIDE 10 MG/ML
INJECTION, SOLUTION EPIDURAL; INFILTRATION; INTRACAUDAL; PERINEURAL
Status: DISCONTINUED | OUTPATIENT
Start: 2024-05-07 | End: 2024-05-07

## 2024-05-07 RX ORDER — ONDANSETRON 4 MG/1
4 TABLET, ORALLY DISINTEGRATING ORAL ONCE
Status: COMPLETED | OUTPATIENT
Start: 2024-05-07 | End: 2024-05-07

## 2024-05-07 RX ORDER — LIDOCAINE HYDROCHLORIDE 10 MG/ML
1 INJECTION, SOLUTION EPIDURAL; INFILTRATION; INTRACAUDAL; PERINEURAL ONCE
Status: DISCONTINUED | OUTPATIENT
Start: 2024-05-07 | End: 2024-05-07 | Stop reason: HOSPADM

## 2024-05-07 RX ORDER — GABAPENTIN 300 MG/1
300 CAPSULE ORAL
Status: COMPLETED | OUTPATIENT
Start: 2024-05-07 | End: 2024-05-07

## 2024-05-07 RX ORDER — DIPHENHYDRAMINE HYDROCHLORIDE 50 MG/ML
25 INJECTION INTRAMUSCULAR; INTRAVENOUS EVERY 6 HOURS PRN
Status: CANCELLED | OUTPATIENT
Start: 2024-05-07

## 2024-05-07 RX ORDER — PROPOFOL 10 MG/ML
VIAL (ML) INTRAVENOUS
Status: DISCONTINUED | OUTPATIENT
Start: 2024-05-07 | End: 2024-05-07

## 2024-05-07 RX ORDER — ONDANSETRON HYDROCHLORIDE 2 MG/ML
4 INJECTION, SOLUTION INTRAVENOUS EVERY 6 HOURS PRN
Status: DISCONTINUED | OUTPATIENT
Start: 2024-05-07 | End: 2024-05-07 | Stop reason: HOSPADM

## 2024-05-07 RX ORDER — HYDROCODONE BITARTRATE AND ACETAMINOPHEN 5; 325 MG/1; MG/1
1 TABLET ORAL EVERY 6 HOURS PRN
Qty: 28 TABLET | Refills: 0 | Status: ON HOLD | OUTPATIENT
Start: 2024-05-07 | End: 2024-05-20 | Stop reason: HOSPADM

## 2024-05-07 RX ORDER — CALCIUM CARBONATE 200(500)MG
500 TABLET,CHEWABLE ORAL 3 TIMES DAILY PRN
Status: DISCONTINUED | OUTPATIENT
Start: 2024-05-07 | End: 2024-05-07 | Stop reason: HOSPADM

## 2024-05-07 RX ORDER — METOCLOPRAMIDE HYDROCHLORIDE 5 MG/ML
10 INJECTION INTRAMUSCULAR; INTRAVENOUS EVERY 10 MIN PRN
Status: CANCELLED | OUTPATIENT
Start: 2024-05-07

## 2024-05-07 RX ORDER — SODIUM CHLORIDE, SODIUM GLUCONATE, SODIUM ACETATE, POTASSIUM CHLORIDE AND MAGNESIUM CHLORIDE 30; 37; 368; 526; 502 MG/100ML; MG/100ML; MG/100ML; MG/100ML; MG/100ML
INJECTION, SOLUTION INTRAVENOUS CONTINUOUS
Status: DISCONTINUED | OUTPATIENT
Start: 2024-05-07 | End: 2024-05-07 | Stop reason: HOSPADM

## 2024-05-07 RX ORDER — LIDOCAINE HYDROCHLORIDE 10 MG/ML
INJECTION INFILTRATION; PERINEURAL
Status: DISCONTINUED
Start: 2024-05-07 | End: 2024-05-07 | Stop reason: WASHOUT

## 2024-05-07 RX ORDER — ROPIVACAINE HYDROCHLORIDE 5 MG/ML
INJECTION, SOLUTION EPIDURAL; INFILTRATION; PERINEURAL
Status: DISCONTINUED | OUTPATIENT
Start: 2024-05-07 | End: 2024-05-07

## 2024-05-07 RX ORDER — MUPIROCIN 20 MG/G
OINTMENT TOPICAL 2 TIMES DAILY
Status: DISCONTINUED | OUTPATIENT
Start: 2024-05-07 | End: 2024-05-07 | Stop reason: HOSPADM

## 2024-05-07 RX ORDER — METOCLOPRAMIDE HYDROCHLORIDE 5 MG/ML
10 INJECTION INTRAMUSCULAR; INTRAVENOUS EVERY 6 HOURS PRN
Status: DISCONTINUED | OUTPATIENT
Start: 2024-05-07 | End: 2024-05-07 | Stop reason: HOSPADM

## 2024-05-07 RX ORDER — ONDANSETRON HYDROCHLORIDE 2 MG/ML
4 INJECTION, SOLUTION INTRAVENOUS DAILY PRN
Status: CANCELLED | OUTPATIENT
Start: 2024-05-07

## 2024-05-07 RX ORDER — METHOCARBAMOL 500 MG/1
500 TABLET, FILM COATED ORAL EVERY 6 HOURS PRN
Status: DISCONTINUED | OUTPATIENT
Start: 2024-05-07 | End: 2024-05-07 | Stop reason: HOSPADM

## 2024-05-07 RX ORDER — INDOMETHACIN 25 MG/1
CAPSULE ORAL
Status: DISCONTINUED
Start: 2024-05-07 | End: 2024-05-07 | Stop reason: WASHOUT

## 2024-05-07 RX ORDER — ROPIVACAINE HYDROCHLORIDE 5 MG/ML
INJECTION, SOLUTION EPIDURAL; INFILTRATION; PERINEURAL
Status: DISCONTINUED
Start: 2024-05-07 | End: 2024-05-07 | Stop reason: HOSPADM

## 2024-05-07 RX ADMIN — MIDAZOLAM HYDROCHLORIDE 2 MG: 1 INJECTION, SOLUTION INTRAMUSCULAR; INTRAVENOUS at 07:05

## 2024-05-07 RX ADMIN — MIDAZOLAM 1 MG: 1 INJECTION INTRAMUSCULAR; INTRAVENOUS at 07:05

## 2024-05-07 RX ADMIN — GABAPENTIN 300 MG: 300 CAPSULE ORAL at 07:05

## 2024-05-07 RX ADMIN — SODIUM CHLORIDE, SODIUM GLUCONATE, SODIUM ACETATE, POTASSIUM CHLORIDE AND MAGNESIUM CHLORIDE: 526; 502; 368; 37; 30 INJECTION, SOLUTION INTRAVENOUS at 07:05

## 2024-05-07 RX ADMIN — ACETAMINOPHEN 1000 MG: 500 TABLET ORAL at 07:05

## 2024-05-07 RX ADMIN — ROPIVACAINE HYDROCHLORIDE 30 ML: 5 INJECTION, SOLUTION EPIDURAL; INFILTRATION; PERINEURAL at 07:05

## 2024-05-07 RX ADMIN — ONDANSETRON 4 MG: 4 TABLET, ORALLY DISINTEGRATING ORAL at 07:05

## 2024-05-07 RX ADMIN — PROPOFOL 60 MG: 10 INJECTION, EMULSION INTRAVENOUS at 07:05

## 2024-05-07 RX ADMIN — LIDOCAINE HYDROCHLORIDE 50 MG: 10 INJECTION, SOLUTION EPIDURAL; INFILTRATION; INTRACAUDAL; PERINEURAL at 07:05

## 2024-05-07 NOTE — ANESTHESIA PREPROCEDURE EVALUATION
05/07/2024  Elsie Ahuja is a 33 y.o., male presents with Carpal tunnel syndrome, bilateral.  Diagnosis:   Bilateral carpal tunnel syndrome [G56.03]   Pre-op diagnosis: Bilateral carpal tunnel syndrome [G56.03]         The pt. comes to [facility] for the noted procedure under  GA (LMA vs TIVA).  Scheduled w/o block.  Procedures:      RELEASE, CARPAL TUNNEL (Left: Hand)      TRANSPOSITION, NERVE, ULNAR (Left: Arm Lower)   Anesthesia type: Gen Supraglottic Airway           PMHx:  Other Medical History   Anxiety ADHD (attention deficit hyperactivity disorder)   Hypertension Carpal tunnel syndrome     Problem List  Current as of 05/07/24 0528  ADHD (attention deficit hyperactivity disorder) Anxiety   Carpal tunnel syndrome Cigarette smoker   Family history of diabetes mellitus Hepatitis C   Hernia of abdominal wall Hypertension   Nocturia Onychomycosis   Opioid abuse S/P splenectomy   Screen for STD (sexually transmitted disease)      PSHx:  Surgical History:  SPLENECTOMY, TOTAL       Vital signs:        Lab Data:      EKG:.      Pre-op Assessment    I have reviewed the Patient Summary Reports.     I have reviewed the Nursing Notes. I have reviewed the NPO Status.   I have reviewed the Medications.     Review of Systems  Anesthesia Hx:  No problems with previous Anesthesia                Social:  Non-Smoker       Hematology/Oncology:  Hematology Normal   Oncology Normal                                   EENT/Dental:  EENT/Dental Normal           Cardiovascular:  Exercise tolerance: good   Hypertension                Functional Capacity good / => 4 METS                         Pulmonary:  Pulmonary Normal                       Renal/:  Renal/ Normal                 Hepatic/GI:      Liver Disease, Hepatitis           Musculoskeletal:  Musculoskeletal Normal                Neurological:    Neuromuscular Disease,                                    Endocrine:  Endocrine Normal            Dermatological:  Skin Normal    Psych:  Psychiatric History                  Physical Exam  General: Alert, Oriented, Well nourished and Cooperative    Airway:  Mallampati: II   Mouth Opening: Normal  TM Distance: Normal  Tongue: Normal  Neck ROM: Normal ROM    Dental:  Intact    Chest/Lungs:  Clear to auscultation, Normal Respiratory Rate    Heart:  Rate: Normal  Rhythm: Regular Rhythm        Anesthesia Plan  Type of Anesthesia, risks & benefits discussed:    Anesthesia Type: Gen Supraglottic Airway  Intra-op Monitoring Plan: Standard ASA Monitors  Post Op Pain Control Plan: multimodal analgesia and IV/PO Opioids PRN  Induction:  IV  Airway Plan: Direct  Informed Consent: Informed consent signed with the Patient and all parties understand the risks and agree with anesthesia plan.  All questions answered. Patient consented to blood products? Yes  ASA Score: 1  Day of Surgery Review of History & Physical: H&P Update referred to the surgeon/provider.    Ready For Surgery From Anesthesia Perspective.     .

## 2024-05-07 NOTE — OP NOTE
Operative Note    Patient Information:  Elsie Ahuja    Date of Surgery:  05/07/2024    Surgeon:  Nura Palacio MD    Assistant:  Felciity Hahn PA-C  who was necessary and essential for all aspects of the operation, including but not limited to patient positioning, surgical exposure, wound closure, and dressing placement.      Pre-operative Diagnosis:  Left Carpal tunnel and Cubital tunnel syndromes    Post-operative Diagnosis:  same    Procedure Performed:  Left Endoscopic in situ cubital tunnel release (CPT 765911300)  2. Left Carpal tunnel release (CPT 41857 with Modifier 51)    Anesthesia:  General    Complications:  None    Blood Loss:  See anesthesia record      Specimens:  None    Implants:  None    Indications for Procedure:  Elsie Ahuja is a 33 y.o. male that has left carpal and cubital tunnel syndromes.     I explained that surgery and the nature of their condition are not without risks. These include, but are not limited to, bleeding, infection, neurovascular compromise, wound complications, scarring, cosmetic defects, need for later and/or repeated surgeries, pain, loss of ROM, loss of function, deformity, functional abnormalities, stiffness, thromboembolic complications, compartment syndrome, loss of limb, loss of life, anesthetic complications, and other imponderables. I explained that these can occur despite the adequacy of treatments rendered, and that their risks are heightened given the nature of their condition. They verbalized understanding. They would like to continue with surgery at this time. If appropriate family was involved with surgical discussion.  The patient expressed understanding of and agreement with the plan. Informed consent was obtained and signed prior going to the operative room.    Procedure in Detail:  Patient was brought to the operating room and placed under General anesthesia by the anesthesia department without difficulty. The patient was then placed in the supine  position on the operating room table.     Time out was performed and all parties present agreed with correct patient, correct procedure, correct side, correct site. The operative extremity was prepped and draped in standard normal fashion.     Pre-incision antibiotics were administered prior to skin incision.    A curvilinear incision was made over the medial elbow between the medial epicondyle and olecranon.  Dissection was carefully taken through subcutaneous tissue with care to avoid any injury to crossing cutaneous branches specifically the medial antebrachial cutaneous nerve.  Dissection was carried down to the ulnar nerve at the medial epicondyle.  The ligament of Ham was released and the nerve was freed at the level of the medial epicondyle.  After complete release of the nerve through the incision, a nasal speculum was placed into the wound distally.  Under endoscopic visualization, the FCU fascia was carefully released.  Speculum was removed and reinserted in a deeper layer to expose the ulnar nerve and its sheath.  The sheath above the ulnar nerve was carefully released to the level of the 1st motor branch under endoscopic visualization.    The speculum was removed and inserted proximally.  Under endoscopic visualization the arcade of [place] was released.  Sheath above the ulnar nerve was also released to the mid brachium.    The elbow was ranged and the nerve was found to not sublux.    The wound was copiously irrigated.  A Hemovac drain was placed.  The skin was closed with 3-0 Monocryl and 3-0 nylon suture.    An approximately 2.0 centimeter longitudinal incision was made beginning 0.5 centimeters distal to the volar wrist crease extending distalward in line with the radial aspect of the ring finger ray.  Careful dissection was performed in order to preserve and to protect the palmar cutaneous branch of the median nerve. Bipolar electrocautery was used for hemostasis.  The palmar aponeurosis was  divided longitudinally to expose the underlying convergence of the volar carpal ligament and the thenar fascia. The transverse carpal ligament was exposed.  Under direct visualization the transverse carpal ligament was divided from its distal most aspect to the level of the volar wrist crease with care is to protect the median nerve, the ulnar neurovascular structures, the superficial palmar arch, and the thenar motor branch of the median nerve.  The distal 3 to 4 centimeters of the antebrachial forearm fascia were divided longitudinally under direct visualization using curved tenotomy scissors contiguous with the transverse carpal ligament incision site. The nerve was observed to be free of compression or constriction from the surrounding tissues throughout its course in the distal forearm to the mid-palm.     The median nerve was noted to be intact and was without evidence of constrictive deformity.  There was no evidence of significant flexor tenosynovitis nor evidence of an intra-carpal canal mass.    Incision was copiously irrigated and closed with 4-0 nylon and 5-0 plain gut suture. A soft dressing was applied.  Patient was extubated without complications and transferred to the recovery room in stable condition.       Post-operative Plan:  The patient tolerated the procedure and anesthesia well and was taken to the postoperative care unit in good and stable condition.  Postoperatively, the digits were pink and well-perfused and the hand compartments and soft tissues were supple.  The dressings were clean and dry.     Postoperative discharge instructions were reviewed with the patient and family / responsible party accompanying the patient today.  Instructions for strict hand elevation over the next 48 hours and as needed to minimize swelling, loosening of the dressing as needed for swelling or tightness, general wound care instructions, activity restrictions, and postoperative medication use / analgesic use  have been advised.  The patient has been instructed to keep the incision dry and to keep dressing on until clinic follow up in 2 weeks.      Activity and work restrictions have been advised for the followin. No work for 2 to 7 days postoperatively tolerated; 2. Incision is to remain covered at all times while at work; 3. No lifting, pushing, pulling of greater than 1 pound for 2 weeks, advancing to no more than 2 to 3 pounds until week 4, then the patient may progress with activity as tolerated with anticipated discharge to full activity at 6 to 8 weeks postoperatively; 4.  no repetitive use of the hands and wrists for 4 weeks postoperatively; 5. No climbing or use of heavy machinery until released to full activity.

## 2024-05-07 NOTE — TRANSFER OF CARE
"Anesthesia Transfer of Care Note    Patient: Elsie Ahuja    Procedure(s) Performed: Procedure(s) (LRB):  RELEASE, CARPAL TUNNEL (Left)  TRANSPOSITION, NERVE, ULNAR (Left)    Patient location: OPS    Anesthesia Type: regional and general    Transport from OR: Transported from OR on room air with adequate spontaneous ventilation    Post pain: adequate analgesia    Post assessment: no apparent anesthetic complications and tolerated procedure well    Post vital signs: stable    Level of consciousness: responds to stimulation    Nausea/Vomiting: no nausea/vomiting    Complications: none    Transfer of care protocol was followed      Last vitals: Visit Vitals  BP (!) 115/57   Pulse 81   Temp 36.6 °C (97.9 °F) (Tympanic)   Resp 16   Ht 5' 10" (1.778 m)   Wt 85 kg (187 lb 6.3 oz)   SpO2 (!) 94%   BMI 26.89 kg/m²     "

## 2024-05-07 NOTE — DISCHARGE SUMMARY
[facility] - Periop Services  Discharge Note  Short Stay    Procedure(s) (LRB):  RELEASE, CARPAL TUNNEL (Left)  TRANSPOSITION, NERVE, ULNAR (Left)      OUTCOME: Patient tolerated treatment/procedure well without complication and is now ready for discharge.    DISPOSITION: Home or Self Care    FINAL DIAGNOSIS:  Left carpal tunnel and cubital tunnel syndrome    FOLLOWUP: In clinic    DISCHARGE INSTRUCTIONS:    Discharge Procedure Orders   Diet general     Activity as tolerated     Keep surgical extremity elevated     Ice to affected area     Lifting restrictions     No driving, operating heavy equipment or signing legal documents while taking pain medication.     Other restrictions (specify):   Order Comments: Okay to wean sling as tolerated.     Remove dressing in 72 hours     Wound care routine (specify)   Order Comments: Wound care routine: keep dressing clean, dry, and intact. Ok to remove in 3 days. Ok to shower once removed. No submersion.     Call MD for:  temperature >100.4     Call MD for:  persistent nausea and vomiting     Call MD for:  severe uncontrolled pain     Call MD for:  difficulty breathing, headache or visual disturbances     Call MD for:  redness, tenderness, or signs of infection (pain, swelling, redness, odor or green/yellow discharge around incision site)     Call MD for:  hives     Call MD for:  persistent dizziness or light-headedness     Call MD for:  extreme fatigue     Shower on day dressing removed (No bath)        TIME SPENT ON DISCHARGE: 5 minutes

## 2024-05-07 NOTE — ANESTHESIA PROCEDURE NOTES
Peripheral Block/Supraclavicular    Patient location during procedure: pre-op   Block not for primary anesthetic.  Reason for block: at surgeon's request and post-op pain management   Post-op Pain Location: Left Hand /wrist, Elbow   Start time: 5/7/2024 7:40 AM  Timeout: 5/7/2024 7:35 AM   End time: 5/7/2024 7:42 AM    Staffing  Authorizing Provider: Clint Vang MD  Performing Provider: Clint Vang MD    Staffing  Performed by: Clint Vang MD  Authorized by: Clint Vang MD    Preanesthetic Checklist  Completed: patient identified, IV checked, site marked, risks and benefits discussed, surgical consent, monitors and equipment checked, pre-op evaluation and timeout performed  Peripheral Block  Patient position: supine  Prep: ChloraPrep  Patient monitoring: heart rate, cardiac monitor, continuous pulse ox, continuous capnometry and frequent blood pressure checks  Block type: supraclavicular  Laterality: left  Injection technique: single shot  Needle  Needle type: Stimuplex   Needle gauge: 22 G  Needle length: 2 in  Needle localization: anatomical landmarks and ultrasound guidance   -ultrasound image captured on disc.  Assessment  Injection assessment: negative aspiration, negative parasthesia and local visualized surrounding nerve  Paresthesia pain: none  Heart rate change: no  Slow fractionated injection: yes  Pain Tolerance: comfortable throughout block  Medications:    Medications: ropivacaine (NAROPIN) injection 0.5% - Perineural   30 mL - 5/7/2024 7:40:00 AM    Additional Notes    Anatomy of Brachial plexus and needle identified utilizing Ultrasound. With needle in close  Proximity to nerves and neg.aspiration, local anesthetic injected incrementally(1+5mls) w/o difficulty  Injectate observed surrounding nerves. Image saved and downloaded.     Total injected: 30mls    VSS.  [facility] RN monitoring vitals throughout procedure.  Patient tolerated procedure well.

## 2024-05-08 NOTE — ANESTHESIA POSTPROCEDURE EVALUATION
Anesthesia Post Evaluation    Patient: Elsie Ahuja    Procedure(s) Performed: Procedure(s) (LRB):  RELEASE, CARPAL TUNNEL (Left)  TRANSPOSITION, NERVE, ULNAR (Left)    Final Anesthesia Type: general      Patient location during evaluation: PACU  Patient participation: Yes- Able to Participate  Level of consciousness: awake and alert and oriented  Post-procedure vital signs: reviewed and stable  Pain management: adequate  Airway patency: patent  DIOGO mitigation strategies: Multimodal analgesia  PONV status at discharge: No PONV  Anesthetic complications: no      Cardiovascular status: blood pressure returned to baseline, hemodynamically stable and stable  Respiratory status: unassisted  Hydration status: euvolemic  Follow-up not needed.  Comments: PostOp pain well managed with Nerve Block (Suprclavic., Axillary, Femoral, etc.)              Vitals Value Taken Time   /63 05/07/24 0932   Temp 36.3 °C (97.3 °F) 05/07/24 0915   Pulse 71 05/07/24 0939   Resp 17 05/07/24 0930   SpO2 100 % 05/07/24 0939   Vitals shown include unfiled device data.      No case tracking events are documented in the log.      Pain/Rayshawn Score: Pain Rating Prior to Med Admin: 3 (5/7/2024  7:08 AM)  Rayshawn Score: 10 (5/7/2024  9:41 AM)  Modified Rayshawn Score: 20 (5/7/2024  9:41 AM)

## 2024-05-17 NOTE — ADDENDUM NOTE
Addendum  created 05/17/24 1020 by lCint Vang MD    Clinical Note Signed, Diagnosis association updated, Intraprocedure Blocks edited, SmartForm saved

## 2024-05-18 ENCOUNTER — HOSPITAL ENCOUNTER (INPATIENT)
Facility: HOSPITAL | Age: 34
LOS: 2 days | Discharge: HOME | DRG: 863 | End: 2024-05-20
Attending: STUDENT IN AN ORGANIZED HEALTH CARE EDUCATION/TRAINING PROGRAM | Admitting: INTERNAL MEDICINE
Payer: COMMERCIAL

## 2024-05-18 DIAGNOSIS — T81.41XA INFECTION OF SUPERFICIAL INCISIONAL SURGICAL SITE AFTER PROCEDURE, INITIAL ENCOUNTER: Primary | ICD-10-CM

## 2024-05-18 DIAGNOSIS — M25.532 LEFT WRIST PAIN: ICD-10-CM
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
## 2024-05-18 LAB
ALBUMIN SERPL-MCNC: 3.8 G/DL (ref 3.5–5)
ALBUMIN/GLOB SERPL: 1.2 RATIO (ref 1.1–2)
ALP SERPL-CCNC: 83 UNIT/L (ref 40–150)
ALT SERPL-CCNC: 60 UNIT/L (ref 0–55)
ANION GAP SERPL CALC-SCNC: 8 MEQ/L
AST SERPL-CCNC: 89 UNIT/L (ref 5–34)
BASOPHILS # BLD AUTO: 0.07 X10(3)/MCL
BASOPHILS NFR BLD AUTO: 0.7 %
BILIRUB SERPL-MCNC: 0.3 MG/DL
BUN SERPL-MCNC: 11.5 MG/DL (ref 8.9–20.6)
CALCIUM SERPL-MCNC: 9.6 MG/DL (ref 8.4–10.2)
CHLORIDE SERPL-SCNC: 106 MMOL/L (ref 98–107)
CO2 SERPL-SCNC: 26 MMOL/L (ref 22–29)
CREAT SERPL-MCNC: 0.89 MG/DL (ref 0.73–1.18)
CREAT/UREA NIT SERPL: 13
CRP SERPL HS-MCNC: 1.93 MG/L
EOSINOPHIL # BLD AUTO: 0.24 X10(3)/MCL (ref 0–0.9)
EOSINOPHIL NFR BLD AUTO: 2.3 %
ERYTHROCYTE [DISTWIDTH] IN BLOOD BY AUTOMATED COUNT: 13 % (ref 11.5–17)
ERYTHROCYTE [SEDIMENTATION RATE] IN BLOOD: 10 MM/HR (ref 0–15)
EST. AVERAGE GLUCOSE BLD GHB EST-MCNC: 93.9 MG/DL
GFR SERPLBLD CREATININE-BSD FMLA CKD-EPI: >60 ML/MIN/1.73/M2
GLOBULIN SER-MCNC: 3.1 GM/DL (ref 2.4–3.5)
GLUCOSE SERPL-MCNC: 73 MG/DL (ref 74–100)
HBA1C MFR BLD: 4.9 %
HCT VFR BLD AUTO: 42.9 % (ref 42–52)
HGB BLD-MCNC: 14 G/DL (ref 14–18)
IMM GRANULOCYTES # BLD AUTO: 0.04 X10(3)/MCL (ref 0–0.04)
IMM GRANULOCYTES NFR BLD AUTO: 0.4 %
LACTATE SERPL-SCNC: 1.7 MMOL/L (ref 0.5–2.2)
LYMPHOCYTES # BLD AUTO: 2.08 X10(3)/MCL (ref 0.6–4.6)
LYMPHOCYTES NFR BLD AUTO: 20.1 %
MCH RBC QN AUTO: 28.6 PG (ref 27–31)
MCHC RBC AUTO-ENTMCNC: 32.6 G/DL (ref 33–36)
MCV RBC AUTO: 87.6 FL (ref 80–94)
MONOCYTES # BLD AUTO: 1.6 X10(3)/MCL (ref 0.1–1.3)
MONOCYTES NFR BLD AUTO: 15.5 %
NEUTROPHILS # BLD AUTO: 6.32 X10(3)/MCL (ref 2.1–9.2)
NEUTROPHILS NFR BLD AUTO: 61 %
NRBC BLD AUTO-RTO: 0 %
PLATELET # BLD AUTO: 454 X10(3)/MCL (ref 130–400)
PMV BLD AUTO: 10 FL (ref 7.4–10.4)
POTASSIUM SERPL-SCNC: 4.2 MMOL/L (ref 3.5–5.1)
PROT SERPL-MCNC: 6.9 GM/DL (ref 6.4–8.3)
RBC # BLD AUTO: 4.9 X10(6)/MCL (ref 4.7–6.1)
SODIUM SERPL-SCNC: 140 MMOL/L (ref 136–145)
WBC # SPEC AUTO: 10.35 X10(3)/MCL (ref 4.5–11.5)

## 2024-05-18 PROCEDURE — 99900031 HC PATIENT EDUCATION (STAT)

## 2024-05-18 PROCEDURE — 86141 C-REACTIVE PROTEIN HS: CPT | Performed by: STUDENT IN AN ORGANIZED HEALTH CARE EDUCATION/TRAINING PROGRAM

## 2024-05-18 PROCEDURE — 94761 N-INVAS EAR/PLS OXIMETRY MLT: CPT

## 2024-05-18 PROCEDURE — 85652 RBC SED RATE AUTOMATED: CPT | Performed by: STUDENT IN AN ORGANIZED HEALTH CARE EDUCATION/TRAINING PROGRAM

## 2024-05-18 PROCEDURE — 25000003 PHARM REV CODE 250: Performed by: STUDENT IN AN ORGANIZED HEALTH CARE EDUCATION/TRAINING PROGRAM

## 2024-05-18 PROCEDURE — 11000001 HC ACUTE MED/SURG PRIVATE ROOM

## 2024-05-18 PROCEDURE — 63600175 PHARM REV CODE 636 W HCPCS: Performed by: STUDENT IN AN ORGANIZED HEALTH CARE EDUCATION/TRAINING PROGRAM

## 2024-05-18 PROCEDURE — 85025 COMPLETE CBC W/AUTO DIFF WBC: CPT | Performed by: STUDENT IN AN ORGANIZED HEALTH CARE EDUCATION/TRAINING PROGRAM

## 2024-05-18 PROCEDURE — 25000003 PHARM REV CODE 250: Performed by: INTERNAL MEDICINE

## 2024-05-18 PROCEDURE — 87040 BLOOD CULTURE FOR BACTERIA: CPT | Performed by: STUDENT IN AN ORGANIZED HEALTH CARE EDUCATION/TRAINING PROGRAM

## 2024-05-18 PROCEDURE — 83036 HEMOGLOBIN GLYCOSYLATED A1C: CPT | Performed by: INTERNAL MEDICINE

## 2024-05-18 PROCEDURE — 63600175 PHARM REV CODE 636 W HCPCS: Performed by: INTERNAL MEDICINE

## 2024-05-18 PROCEDURE — 96365 THER/PROPH/DIAG IV INF INIT: CPT

## 2024-05-18 PROCEDURE — 80053 COMPREHEN METABOLIC PANEL: CPT | Performed by: STUDENT IN AN ORGANIZED HEALTH CARE EDUCATION/TRAINING PROGRAM

## 2024-05-18 PROCEDURE — 99285 EMERGENCY DEPT VISIT HI MDM: CPT | Mod: 25

## 2024-05-18 PROCEDURE — 36415 COLL VENOUS BLD VENIPUNCTURE: CPT | Performed by: INTERNAL MEDICINE

## 2024-05-18 PROCEDURE — 83605 ASSAY OF LACTIC ACID: CPT | Performed by: STUDENT IN AN ORGANIZED HEALTH CARE EDUCATION/TRAINING PROGRAM

## 2024-05-18 RX ORDER — BUSPIRONE HYDROCHLORIDE 5 MG/1
5 TABLET ORAL 2 TIMES DAILY
Status: DISCONTINUED | OUTPATIENT
Start: 2024-05-18 | End: 2024-05-20 | Stop reason: HOSPADM

## 2024-05-18 RX ORDER — HYDROCHLOROTHIAZIDE 12.5 MG/1
12.5 TABLET ORAL DAILY
Status: DISCONTINUED | OUTPATIENT
Start: 2024-05-18 | End: 2024-05-20 | Stop reason: HOSPADM

## 2024-05-18 RX ORDER — ACETAMINOPHEN 325 MG/1
650 TABLET ORAL EVERY 6 HOURS PRN
Status: DISCONTINUED | OUTPATIENT
Start: 2024-05-18 | End: 2024-05-20 | Stop reason: HOSPADM

## 2024-05-18 RX ORDER — TALC
6 POWDER (GRAM) TOPICAL NIGHTLY PRN
Status: DISCONTINUED | OUTPATIENT
Start: 2024-05-18 | End: 2024-05-20 | Stop reason: HOSPADM

## 2024-05-18 RX ORDER — HYDROCODONE BITARTRATE AND ACETAMINOPHEN 5; 325 MG/1; MG/1
1 TABLET ORAL EVERY 6 HOURS PRN
Status: DISCONTINUED | OUTPATIENT
Start: 2024-05-18 | End: 2024-05-20 | Stop reason: HOSPADM

## 2024-05-18 RX ORDER — SODIUM CHLORIDE 0.9 % (FLUSH) 0.9 %
10 SYRINGE (ML) INJECTION
Status: DISCONTINUED | OUTPATIENT
Start: 2024-05-18 | End: 2024-05-20 | Stop reason: HOSPADM

## 2024-05-18 RX ORDER — LISINOPRIL 10 MG/1
10 TABLET ORAL DAILY
Status: DISCONTINUED | OUTPATIENT
Start: 2024-05-18 | End: 2024-05-20 | Stop reason: HOSPADM

## 2024-05-18 RX ORDER — LISINOPRIL AND HYDROCHLOROTHIAZIDE 10; 12.5 MG/1; MG/1
1 TABLET ORAL DAILY
Status: DISCONTINUED | OUTPATIENT
Start: 2024-05-18 | End: 2024-05-18 | Stop reason: CLARIF

## 2024-05-18 RX ADMIN — AMPICILLIN SODIUM AND SULBACTAM SODIUM 3 G: 2; 1 INJECTION, POWDER, FOR SOLUTION INTRAMUSCULAR; INTRAVENOUS at 05:05

## 2024-05-18 RX ADMIN — HYDROCODONE BITARTRATE AND ACETAMINOPHEN 1 TABLET: 5; 325 TABLET ORAL at 08:05

## 2024-05-18 RX ADMIN — HYDROCODONE BITARTRATE AND ACETAMINOPHEN 1 TABLET: 5; 325 TABLET ORAL at 01:05

## 2024-05-18 RX ADMIN — AMPICILLIN SODIUM AND SULBACTAM SODIUM 3 G: 2; 1 INJECTION, POWDER, FOR SOLUTION INTRAMUSCULAR; INTRAVENOUS at 12:05

## 2024-05-18 RX ADMIN — BUSPIRONE HYDROCHLORIDE 5 MG: 5 TABLET ORAL at 08:05

## 2024-05-18 NOTE — ED PROVIDER NOTES
Encounter Date: 5/18/2024       History     Chief Complaint   Patient presents with    Hand Pain     Pt here for suture removal from left palm; pt had carpal tunnel surgery on 5-7 with dr fonseca; pt complaining of pain at site; site appears red, swollen     HPI    33-year-old male with a past medical history of a splenectomy presents emergency department for concerns of infection to his left palm after he had a carpal tunnel release 11 days ago.  Patient states that 2 days ago he was playing with his dog when the dog's tooth accidentally went into the incision.  States it is became red and swollen since then it has worsened significantly.  Denies any drainage or fever.  States the incision to his elbow looks great.    Review of patient's allergies indicates:   Allergen Reactions    Sulfa (sulfonamide antibiotics) Nausea And Vomiting and Nausea Only     Past Medical History:   Diagnosis Date    ADHD (attention deficit hyperactivity disorder)     Anxiety     Carpal tunnel syndrome     GERD (gastroesophageal reflux disease)     Hypertension      Past Surgical History:   Procedure Laterality Date    CARPAL TUNNEL RELEASE Left 5/7/2024    Procedure: RELEASE, CARPAL TUNNEL;  Surgeon: Nura Palacio MD;  Location: [facility] OR;  Service: Orthopedics;  Laterality: Left;    SPLENECTOMY, TOTAL      ULNAR NERVE TRANSPOSITION Left 5/7/2024    Procedure: TRANSPOSITION, NERVE, ULNAR;  Surgeon: Nura Palacio MD;  Location: [facility] OR;  Service: Orthopedics;  Laterality: Left;     Family History   Problem Relation Name Age of Onset    Arthritis Mother Meghann Ahuja     Depression Mother Meghann Ahuja     Hyperlipidemia Mother Meghann Ahuja     Hypertension Mother Meghann Ahuja     Arthritis Father Amando Ahuja     Diabetes Father Amando Ahuja     Hearing loss Father Amando Leigha     Hyperlipidemia Father Amando Ahuja     Hypertension Father Amando Ahuja      Social History     Tobacco Use    Smoking status: Former     Current packs/day: 0.25      Average packs/day: 0.3 packs/day for 5.0 years (1.3 ttl pk-yrs)     Types: Cigarettes    Smokeless tobacco: Never    Tobacco comments:     Smokes socially. Less than 1 pack in 6 months    Substance Use Topics    Alcohol use: Not Currently    Drug use: Not Currently     Types: Marijuana     Review of Systems   Constitutional:  Negative for fever.   Respiratory:  Negative for cough.    Cardiovascular:  Negative for chest pain.   Gastrointestinal:  Negative for abdominal pain, constipation, diarrhea, nausea and vomiting.   Skin:  Positive for color change.   Neurological:  Negative for headaches.   All other systems reviewed and are negative.      Physical Exam     Initial Vitals [05/18/24 1122]   BP Pulse Resp Temp SpO2   (!) 155/95 89 18 98.2 °F (36.8 °C) 99 %      MAP       --         Physical Exam    Nursing note and vitals reviewed.  Constitutional: He appears well-developed and well-nourished. No distress.   Cardiovascular:  Normal rate and regular rhythm.           Pulmonary/Chest: Breath sounds normal. No respiratory distress.   Abdominal: Abdomen is soft. There is no abdominal tenderness.   Musculoskeletal:         General: No tenderness. Normal range of motion.     Neurological: He is alert and oriented to person, place, and time.   Skin: Skin is warm. Capillary refill takes less than 2 seconds.   Significant erythema warmth and mild fluctuance noted to the incisional site to the left wrist.  See imaging below.  The left elbow incision is clean dry and intact and healing well               ED Course   Procedures  Labs Reviewed   COMPREHENSIVE METABOLIC PANEL - Abnormal; Notable for the following components:       Result Value    Glucose 73 (*)     ALT 60 (*)     AST 89 (*)     All other components within normal limits   CBC WITH DIFFERENTIAL - Abnormal; Notable for the following components:    MCHC 32.6 (*)     Platelet 454 (*)     Mono # 1.60 (*)     All other components within normal limits   LACTIC  ACID, PLASMA - Normal   SEDIMENTATION RATE, AUTOMATED - Normal   HIGH SENSITIVITY CRP - Normal   BLOOD CULTURE [facility]   BLOOD CULTURE [facility]   CBC W/ AUTO DIFFERENTIAL    Narrative:     The following orders were created for panel order CBC auto differential.  Procedure                               Abnormality         Status                     ---------                               -----------         ------                     CBC with Differential[[id number]]       Abnormal            Final result                 Please view results for these tests on the individual orders.   HEMOGLOBIN A1C          Imaging Results              X-Ray Wrist Complete Left (In process)                      Medications   ampicillin-sulbactam (UNASYN) 3 g in sodium chloride 0.9 % 100 mL IVPB (MB+) (3 g Intravenous New Bag 5/18/24 1211)   sodium chloride 0.9% flush 10 mL (has no administration in time range)   melatonin tablet 6 mg (has no administration in time range)     Medical Decision Making  differential diagnosis  Postop infection, animal bite wound,  as well as multiple other possible etiologies      Problems Addressed:  Infection of superficial incisional surgical site after procedure, initial encounter: acute illness or injury    Amount and/or Complexity of Data Reviewed  Labs: ordered. Decision-making details documented in ED Course.  Radiology: ordered.    Risk  OTC drugs.  Prescription drug management.  Decision regarding hospitalization.               ED Course as of 05/18/24 1253   Sat May 18, 2024   1135 Spoke with Dr. Singh, orthopedist on-call.  States to pull a few stitches to let it drain.  Place patient on Unasyn.  Admit to medicine.  Consult Dr. Palacio.  Obtain basic blood work with ESR, CRP and blood cultures. [BS]   1251 Sodium: 140 [BS]   1251 Potassium: 4.2 [BS]   1251 Chloride: 106 [BS]   1251 CO2: 26 [BS]   1251 Glucose(!): 73 [BS]   1251 BUN: 11.5 [BS]   1251 Creatinine: 0.89 [BS]   1251 WBC: 10.35 [BS]   1251  Hemoglobin: 14.0 [BS]   1251 Hematocrit: 42.9 [BS]   1251 Platelet Count(!): 454 [BS]   1251 CRP, High Sensitivity: 1.93 [BS]   1251 Sed Rate: 10 [BS]   1251 Hospitalist agrees with admission [BS]      ED Course User Index  [BS] Phillip Alcantara MD                           Clinical Impression:  Final diagnoses:  [T81.41XA] Infection of superficial incisional surgical site after procedure, initial encounter (Primary)  [W54.0XXA] Dog bite, initial encounter  [M25.532] Left wrist pain          ED Disposition Condition    Admit                 Phillip Alcantara MD  05/18/24 1254

## 2024-05-18 NOTE — NURSING
Nurses Note -- 4 Eyes      5/18/2024   1:31 PM      Skin assessed during: Admit      [] No Altered Skin Integrity Present    []Prevention Measures Documented      [x] Yes- Altered Skin Integrity Present or Discovered   [x] LDA Added if Not in Epic (Describe Wound)   [] New Altered Skin Integrity was Present on Admit and Documented in LDA   [x] Wound Image Taken    Wound Care Consulted? No    Attending Nurse:  Viridiana Brantley RN/Staff Member:   OMAR Saravia

## 2024-05-18 NOTE — PROGRESS NOTES
Pt has recent CTR surgery by Dr Palacio. Concern for possible infection. NV intact. Recommend med admission, CRP/SED/CBC, IV abx. We will eval for possible surgery, at this time patient Is stable.    This note/OR report was created with the assistance of  voice recognition software or phone  dictation.  There may be transcription errors as a result of using this technology however minimal. Effort has been made to assure accuracy of transcription but any obvious errors or omissions should be clarified with the author of the document.       Ramón Singh, DO  Orthopedic Trauma Surgery

## 2024-05-18 NOTE — H&P
[facility] EMERGENCY DEPARTMENT    Hospital Medicine History & Physical Examination       Patient Name: Elsie Ahuja  MRN: [id number]  Patient Class: IP- Inpatient   Admission Date: 5/18/2024   Admitting Physician: ARIANNA Service   Length of Stay: 0  Attending Physician: Noé Singh MD  Primary Care Provider: Zayda Sosa MD  Face-to-Face encounter date: 05/18/2024    Code Status: Full Code    Chief Complaint: Hand Pain (Pt here for suture removal from left palm; pt had carpal tunnel surgery on 5-7 with dr fonseca; pt complaining of pain at site; site appears red, swollen)          HISTORY OF PRESENT ILLNESS:   Elsie Ahuja is a 33 y.o. male who  has a past medical history of ADHD (attention deficit hyperactivity disorder), Anxiety, Carpal tunnel syndrome, GERD (gastroesophageal reflux disease), and Hypertension.. The patient presented to [facility] on 5/18/2024 with a primary complaint of hand infection.  Pt had carpal/ulnar release on 5/7/24, was healing fine.  He was playing with his dog and was accidentally bit in the incision.  The wound has had increasing swelling/pain prompting visit to ED.  No f/c    PAST MEDICAL HISTORY:     Past Medical History:   Diagnosis Date    ADHD (attention deficit hyperactivity disorder)     Anxiety     Carpal tunnel syndrome     GERD (gastroesophageal reflux disease)     Hypertension        PAST SURGICAL HISTORY:     Past Surgical History:   Procedure Laterality Date    CARPAL TUNNEL RELEASE Left 5/7/2024    Procedure: RELEASE, CARPAL TUNNEL;  Surgeon: Nura Palacio MD;  Location: [facility] OR;  Service: Orthopedics;  Laterality: Left;    SPLENECTOMY, TOTAL      ULNAR NERVE TRANSPOSITION Left 5/7/2024    Procedure: TRANSPOSITION, NERVE, ULNAR;  Surgeon: Nura Palacio MD;  Location: [facility] OR;  Service: Orthopedics;  Laterality: Left;       ALLERGIES:   Sulfa (sulfonamide antibiotics)    FAMILY HISTORY:   family history includes Arthritis in his  father and mother; Depression in his mother; Diabetes in his father; Hearing loss in his father; Hyperlipidemia in his father and mother; Hypertension in his father and mother.    SOCIAL HISTORY:     Social History     Tobacco Use    Smoking status: Former     Current packs/day: 0.25     Average packs/day: 0.3 packs/day for 5.0 years (1.3 ttl pk-yrs)     Types: Cigarettes    Smokeless tobacco: Never    Tobacco comments:     Smokes socially. Less than 1 pack in 6 months    Substance Use Topics    Alcohol use: Not Currently        HOME MEDICATIONS:     Prior to Admission medications    Medication Sig Start Date End Date Taking? Authorizing Provider   busPIRone (BUSPAR) 5 MG Tab take one tablet by mouth two times daily for anxiety 2/28/23  Yes Provider, Historical   lisinopriL-hydrochlorothiazide (PRINZIDE,ZESTORETIC) 10-12.5 mg per tablet Take 1 tablet by mouth once daily. 8/14/23 8/13/24 Yes Zayda Sosa MD   pantoprazole (PROTONIX) 40 MG tablet Take 1 tablet (40 mg total) by mouth once daily. 3/7/23 5/18/24 Yes Zayda Sosa MD   VYVANSE 50 mg capsule Take 50 mg by mouth every morning. 6/3/23  Yes Provider, Historical   HYDROcodone-acetaminophen (NORCO) 5-325 mg per tablet Take 1 tablet by mouth every 6 (six) hours as needed for Pain. 5/7/24   Felicity Hahn PA-C       REVIEW OF SYSTEMS:   Except as documented, all other systems reviewed and negative   ROS      PHYSICAL EXAM:     VITAL SIGNS: 24 HRS MIN & MAX LAST   Temp  Min: 98.2 °F (36.8 °C)  Max: 98.2 °F (36.8 °C) 98.2 °F (36.8 °C)   BP  Min: 155/95  Max: 155/95 (!) 155/95   Pulse  Min: 89  Max: 89  89   Resp  Min: 18  Max: 18 18   SpO2  Min: 99 %  Max: 99 % 99 %       General appearance: Well-developed, well-nourished male in no apparent distress.  HENT: Atraumatic head. Moist mucous membranes of oral cavity.  Eyes: Normal extraocular movements.   Neck: Supple.   Lungs: Clear to auscultation bilaterally. No wheezing present.   Heart: Regular rate  and rhythm. S1 and S2 present with no murmurs/gallop/rub. No pedal edema. No JVD present.   Abdomen: Soft, non-distended, non-tender. No rebound tenderness/guarding. Bowel sounds are normal.   Extremities: No cyanosis, clubbing, or edema.  Skin: No Rash.  Left palm incision surrounding erythema/swelling/fluctuance .  Left elbow incision intact  Neuro: Motor and sensory exams grossly intact. Good tone. Muscle strength 5/5 in all 4 extremities  Psych/mental status: Appropriate mood and affect. Responds appropriately to questions.     LABS AND IMAGING:     Recent Labs   Lab 05/18/24  1212   WBC 10.35   RBC 4.90   HGB 14.0   HCT 42.9   MCV 87.6   MCH 28.6   MCHC 32.6*   RDW 13.0   *   MPV 10.0       Recent Labs   Lab 05/18/24  1212      K 4.2   CO2 26   BUN 11.5   CREATININE 0.89   CALCIUM 9.6   ALBUMIN 3.8   ALKPHOS 83   ALT 60*   AST 89*   BILITOT 0.3       Microbiology Results (last 7 days)       Procedure Component Value Units Date/Time    Blood culture #1 **CANNOT BE ORDERED STAT** [[id number]] Collected: 05/18/24 1206    Order Status: Sent Specimen: Blood from Arm, Right Updated: 05/18/24 1214    Blood culture #2 **CANNOT BE ORDERED STAT** [[id number]] Collected: 05/18/24 1210    Order Status: Sent Specimen: Blood from Antecubital, Right Updated: 05/18/24 1214             X-Ray Wrist Complete Left  Narrative: EXAMINATION:  XR WRIST COMPLETE 3 VIEWS LEFT    CLINICAL HISTORY:  Pain in left wrist    TECHNIQUE:  Radiographs of the left wrist with AP, lateral and oblique  views.    COMPARISON:  No prior imaging available for comparison    FINDINGS:  There is no acute fracture, subluxation or dislocation.    Joints and interspaces appear maintained.    Osseous structures show normal bone mineral density.    Soft tissues are unremarkable.    There are no radiopaque foreign bodies.  Impression: No acute osseous abnormality, fracture, or dislocation.    There is no significant degenerative  change.    Electronically signed by: John Shepard  Date:    05/18/2024  Time:    13:02        __________________________________________________________________________  INPATIENT LIST OF MEDICATIONS     Scheduled Meds:   ampicillin-sulbactam  3 g Intravenous Q6H    lisinopriL  10 mg Oral Daily    And    hydroCHLOROthiazide  12.5 mg Oral Daily    lisinopriL-hydrochlorothiazide  1 tablet Oral Daily     Continuous Infusions:  PRN Meds:  Current Facility-Administered Medications:     melatonin, 6 mg, Oral, Nightly PRN    sodium chloride 0.9%, 10 mL, Intravenous, PRN          ASSESSMENT & PLAN:   Postop surgical incision infection of hand  Dog bite  Hx htn    Plan    Unasyn  Bcxs  Ortho planning sx Monday  Wound care  Pain control  Resume home med    Dvt proph: diego Singh MD   05/18/2024

## 2024-05-19 LAB
ALBUMIN SERPL-MCNC: 3.4 G/DL (ref 3.5–5)
ALBUMIN/GLOB SERPL: 1.2 RATIO (ref 1.1–2)
ALP SERPL-CCNC: 77 UNIT/L (ref 40–150)
ALT SERPL-CCNC: 57 UNIT/L (ref 0–55)
ANION GAP SERPL CALC-SCNC: 6 MEQ/L
AST SERPL-CCNC: 79 UNIT/L (ref 5–34)
BASOPHILS # BLD AUTO: 0.09 X10(3)/MCL
BASOPHILS NFR BLD AUTO: 1.1 %
BILIRUB SERPL-MCNC: 0.1 MG/DL
BUN SERPL-MCNC: 11.8 MG/DL (ref 8.9–20.6)
CALCIUM SERPL-MCNC: 9.4 MG/DL (ref 8.4–10.2)
CHLORIDE SERPL-SCNC: 108 MMOL/L (ref 98–107)
CO2 SERPL-SCNC: 26 MMOL/L (ref 22–29)
CREAT SERPL-MCNC: 0.84 MG/DL (ref 0.73–1.18)
CREAT/UREA NIT SERPL: 14
EOSINOPHIL # BLD AUTO: 0.43 X10(3)/MCL (ref 0–0.9)
EOSINOPHIL NFR BLD AUTO: 5.1 %
ERYTHROCYTE [DISTWIDTH] IN BLOOD BY AUTOMATED COUNT: 13.2 % (ref 11.5–17)
GFR SERPLBLD CREATININE-BSD FMLA CKD-EPI: >60 ML/MIN/1.73/M2
GLOBULIN SER-MCNC: 2.9 GM/DL (ref 2.4–3.5)
GLUCOSE SERPL-MCNC: 114 MG/DL (ref 74–100)
HCT VFR BLD AUTO: 40 % (ref 42–52)
HGB BLD-MCNC: 13.1 G/DL (ref 14–18)
IMM GRANULOCYTES # BLD AUTO: 0.03 X10(3)/MCL (ref 0–0.04)
IMM GRANULOCYTES NFR BLD AUTO: 0.4 %
LYMPHOCYTES # BLD AUTO: 1.65 X10(3)/MCL (ref 0.6–4.6)
LYMPHOCYTES NFR BLD AUTO: 19.5 %
MCH RBC QN AUTO: 29 PG (ref 27–31)
MCHC RBC AUTO-ENTMCNC: 32.8 G/DL (ref 33–36)
MCV RBC AUTO: 88.5 FL (ref 80–94)
MONOCYTES # BLD AUTO: 1.11 X10(3)/MCL (ref 0.1–1.3)
MONOCYTES NFR BLD AUTO: 13.2 %
NEUTROPHILS # BLD AUTO: 5.13 X10(3)/MCL (ref 2.1–9.2)
NEUTROPHILS NFR BLD AUTO: 60.7 %
NRBC BLD AUTO-RTO: 0 %
PLATELET # BLD AUTO: 411 X10(3)/MCL (ref 130–400)
PMV BLD AUTO: 10 FL (ref 7.4–10.4)
POTASSIUM SERPL-SCNC: 3.9 MMOL/L (ref 3.5–5.1)
PROT SERPL-MCNC: 6.3 GM/DL (ref 6.4–8.3)
RBC # BLD AUTO: 4.52 X10(6)/MCL (ref 4.7–6.1)
SODIUM SERPL-SCNC: 140 MMOL/L (ref 136–145)
WBC # SPEC AUTO: 8.44 X10(3)/MCL (ref 4.5–11.5)

## 2024-05-19 PROCEDURE — 25000003 PHARM REV CODE 250: Performed by: INTERNAL MEDICINE

## 2024-05-19 PROCEDURE — 11000001 HC ACUTE MED/SURG PRIVATE ROOM

## 2024-05-19 PROCEDURE — 36415 COLL VENOUS BLD VENIPUNCTURE: CPT | Performed by: INTERNAL MEDICINE

## 2024-05-19 PROCEDURE — 63600175 PHARM REV CODE 636 W HCPCS: Performed by: INTERNAL MEDICINE

## 2024-05-19 PROCEDURE — 80053 COMPREHEN METABOLIC PANEL: CPT | Performed by: INTERNAL MEDICINE

## 2024-05-19 PROCEDURE — 99900031 HC PATIENT EDUCATION (STAT)

## 2024-05-19 PROCEDURE — 85025 COMPLETE CBC W/AUTO DIFF WBC: CPT | Performed by: INTERNAL MEDICINE

## 2024-05-19 PROCEDURE — 94761 N-INVAS EAR/PLS OXIMETRY MLT: CPT

## 2024-05-19 RX ADMIN — HYDROCODONE BITARTRATE AND ACETAMINOPHEN 1 TABLET: 5; 325 TABLET ORAL at 06:05

## 2024-05-19 RX ADMIN — AMPICILLIN SODIUM AND SULBACTAM SODIUM 3 G: 2; 1 INJECTION, POWDER, FOR SOLUTION INTRAMUSCULAR; INTRAVENOUS at 05:05

## 2024-05-19 RX ADMIN — BUSPIRONE HYDROCHLORIDE 5 MG: 5 TABLET ORAL at 08:05

## 2024-05-19 RX ADMIN — HYDROCODONE BITARTRATE AND ACETAMINOPHEN 1 TABLET: 5; 325 TABLET ORAL at 12:05

## 2024-05-19 RX ADMIN — HYDROCODONE BITARTRATE AND ACETAMINOPHEN 1 TABLET: 5; 325 TABLET ORAL at 07:05

## 2024-05-19 RX ADMIN — AMPICILLIN SODIUM AND SULBACTAM SODIUM 3 G: 2; 1 INJECTION, POWDER, FOR SOLUTION INTRAMUSCULAR; INTRAVENOUS at 12:05

## 2024-05-19 NOTE — PROGRESS NOTES
[facility] ORTHOPAEDIC  [facility] Medicine Progress Note      Patient Name: Elsie Ahuja  MRN: [id number]  Admission Date: 5/18/2024   Length of Stay: 1  Attending Physician: Noé Singh MD  Primary Care Provider: Zayda Sosa MD  Face-to-Face encounter date: 05/19/2024    Code Status: Full Code        Chief Complaint:   Hand Pain (Pt here for suture removal from left palm; pt had carpal tunnel surgery on 5-7 with dr fonseca; pt complaining of pain at site; site appears red, swollen)        HPI:   No notes on file     Overview/Hospital Course:  No notes on file       Interval Hx:       ROS    Objective/physical exam:  General: In no acute distress, afebrile  Chest: Clear to auscultation bilaterally  Heart: RRR, +S1, S2, no appreciable murmur  Abdomen: Soft, nontender, BS +  MSK: Warm, no lower extremity edema, no clubbing or cyanosis  Skin:  Left palm incision surrounding erythema/swelling/fluctuance . Left elbow incision intact   Neurologic: Alert and oriented x4, Cranial nerve II-XII intact, Strength 5/5 in all 4 extremities    VITAL SIGNS: 24 HRS MIN & MAX LAST   Temp  Min: 97.6 °F (36.4 °C)  Max: 98 °F (36.7 °C) 97.6 °F (36.4 °C)   BP  Min: 130/71  Max: 181/79 136/77   Pulse  Min: 72  Max: 93  81   Resp  Min: 18  Max: 18 18   SpO2  Min: 96 %  Max: 98 % 98 %       Recent Labs   Lab 05/18/24  1212 05/19/24  0612   WBC 10.35 8.44   RBC 4.90 4.52*   HGB 14.0 13.1*   HCT 42.9 40.0*   MCV 87.6 88.5   MCH 28.6 29.0   MCHC 32.6* 32.8*   RDW 13.0 13.2   * 411*   MPV 10.0 10.0       Recent Labs   Lab 05/18/24  1212 05/19/24  0612    140   K 4.2 3.9   CO2 26 26   BUN 11.5 11.8   CREATININE 0.89 0.84   CALCIUM 9.6 9.4   ALBUMIN 3.8 3.4*   ALKPHOS 83 77   ALT 60* 57*   AST 89* 79*   BILITOT 0.3 0.1        Microbiology Results (last 7 days)       Procedure Component Value Units Date/Time    Blood culture #1 **CANNOT BE ORDERED STAT** [[id number]] Collected:  05/18/24 1206    Order Status: Resulted Specimen: Blood from Arm, Right Updated: 05/18/24 1214    Blood culture #2 **CANNOT BE ORDERED STAT** [[id number]] Collected: 05/18/24 1210    Order Status: Resulted Specimen: Blood from Antecubital, Right Updated: 05/18/24 1214             Radiology:  X-Ray Wrist Complete Left  Narrative: EXAMINATION:  XR WRIST COMPLETE 3 VIEWS LEFT    CLINICAL HISTORY:  Pain in left wrist    TECHNIQUE:  Radiographs of the left wrist with AP, lateral and oblique  views.    COMPARISON:  No prior imaging available for comparison    FINDINGS:  There is no acute fracture, subluxation or dislocation.    Joints and interspaces appear maintained.    Osseous structures show normal bone mineral density.    Soft tissues are unremarkable.    There are no radiopaque foreign bodies.  Impression: No acute osseous abnormality, fracture, or dislocation.    There is no significant degenerative change.    Electronically signed by: John Shepard  Date:    05/18/2024  Time:    13:02      Scheduled Med:   ampicillin-sulbactam  3 g Intravenous Q6H    busPIRone  5 mg Oral BID    lisinopriL  10 mg Oral Daily    And    hydroCHLOROthiazide  12.5 mg Oral Daily        Continuous Infusions:       PRN Meds:    Current Facility-Administered Medications:     acetaminophen, 650 mg, Oral, Q6H PRN    HYDROcodone-acetaminophen, 1 tablet, Oral, Q6H PRN    melatonin, 6 mg, Oral, Nightly PRN    sodium chloride 0.9%, 10 mL, Intravenous, PRN     Nutrition Status:      Assessment/Plan:  Postop surgical incision infection of hand  Dog bite  Hx htn     Plan     Sutures removed 5/19  Unasyn  Bcxs  Ortho planning sx Monday  Wound care  Pain control        Dvt proph: scd            All diagnosis and differential diagnosis have been reviewed; assessment and plan has been documented; I have personally reviewed the labs and test results that are presently available; I have reviewed the patients medication list; I have reviewed the  consulting providers response and recommendations. I have reviewed or attempted to review medical records based upon their availability      _____________________________________________________________________            Noé Singh MD   05/19/2024

## 2024-05-19 NOTE — PLAN OF CARE
Problem: Adult Inpatient Plan of Care  Goal: Plan of Care Review  Outcome: Progressing  Goal: Patient-Specific Goal (Individualized)  Outcome: Progressing  Goal: Absence of Hospital-Acquired Illness or Injury  Outcome: Progressing  Intervention: Identify and Manage Fall Risk  Flowsheets (Taken 5/18/2024 2050)  Safety Promotion/Fall Prevention:   assistive device/personal item within reach   nonskid shoes/socks when out of bed  Intervention: Prevent Skin Injury  Flowsheets (Taken 5/18/2024 2050)  Body Position:   position changed independently   position maintained  Goal: Optimal Comfort and Wellbeing  Outcome: Progressing  Goal: Readiness for Transition of Care  Outcome: Progressing     Problem: Wound  Goal: Optimal Coping  Outcome: Progressing  Goal: Optimal Functional Ability  Outcome: Progressing  Goal: Absence of Infection Signs and Symptoms  Outcome: Progressing  Goal: Improved Oral Intake  Outcome: Progressing  Goal: Optimal Pain Control and Function  Outcome: Progressing  Goal: Skin Health and Integrity  Outcome: Progressing  Goal: Optimal Wound Healing  Outcome: Progressing     Problem: Pain Acute  Goal: Optimal Pain Control and Function  Outcome: Progressing

## 2024-05-19 NOTE — CONSULTS
[facility] - Orthopaedics  Orthopedics  Consult Note    Patient Name: Elsie Ahuja  MRN: [id number]  Admission Date: 5/18/2024  Hospital Length of Stay: 1 days  Attending Provider: Noé Singh MD  Primary Care Provider: Zayda Sosa MD    Patient information was obtained from ER records.     Consults  Subjective:  Status post carpal tunnel surgery, wound infection     Principal Problem:Infection of superficial incisional surgical site after procedure    Chief Complaint:   Chief Complaint   Patient presents with    Hand Pain     Pt here for suture removal from left palm; pt had carpal tunnel surgery on 5-7 with dr fonseca; pt complaining of pain at site; site appears red, swollen        HPI:  Patient is a 33-year-old male who underwent a carpal and cubital tunnel surgery a proximally 2 weeks ago.  More recently over the last 3 days he is noticed increase in swelling, and change to his wrist incision.  Patient has had  trauma after playing with his dog as well.  He denies any fever or chills, he denies other complaints.    Past Medical History:   Diagnosis Date    ADHD (attention deficit hyperactivity disorder)     Anxiety     Carpal tunnel syndrome     GERD (gastroesophageal reflux disease)     Hypertension        Past Surgical History:   Procedure Laterality Date    CARPAL TUNNEL RELEASE Left 5/7/2024    Procedure: RELEASE, CARPAL TUNNEL;  Surgeon: Nura Palacio MD;  Location: [facility] OR;  Service: Orthopedics;  Laterality: Left;    SPLENECTOMY, TOTAL      ULNAR NERVE TRANSPOSITION Left 5/7/2024    Procedure: TRANSPOSITION, NERVE, ULNAR;  Surgeon: Nura Palacio MD;  Location: [facility] OR;  Service: Orthopedics;  Laterality: Left;       Review of patient's allergies indicates:   Allergen Reactions    Sulfa (sulfonamide antibiotics) Nausea And Vomiting and Nausea Only       Current Facility-Administered Medications   Medication    acetaminophen tablet 650 mg    ampicillin-sulbactam (UNASYN)  3 g in sodium chloride 0.9 % 100 mL IVPB (MB+)    busPIRone tablet 5 mg    lisinopriL tablet 10 mg    And    hydroCHLOROthiazide tablet 12.5 mg    HYDROcodone-acetaminophen 5-325 mg per tablet 1 tablet    melatonin tablet 6 mg    sodium chloride 0.9% flush 10 mL     Family History       Problem Relation (Age of Onset)    Arthritis Mother, Father    Depression Mother    Diabetes Father    Hearing loss Father    Hyperlipidemia Mother, Father    Hypertension Mother, Father          Tobacco Use    Smoking status: Former     Current packs/day: 0.25     Average packs/day: 0.3 packs/day for 5.0 years (1.3 ttl pk-yrs)     Types: Cigarettes    Smokeless tobacco: Never    Tobacco comments:     Smokes socially. Less than 1 pack in 6 months    Substance and Sexual Activity    Alcohol use: Not Currently    Drug use: Not Currently     Types: Marijuana    Sexual activity: Yes     Partners: Female     Birth control/protection: I.U.D.     ROS  Objective:  Patient is well-nourished developed male he is awake alert and oriented x3 he has in no apparent distress he is pleasant and cooperative.  Examination of the left upper extremity compartments are soft and warm.  Examination of the left wrist, he does have a wound dehiscence over the volar aspect of the incision.  He also has tenderness along the proximal edge, there is some slight fluctuance swelling and erythema, there is some minimal drainage to palpation.  His remaining sutures have been removed.  He is able to make a full fist has full extension, he is neurovascular intact distally.     Vital Signs (Most Recent):  Temp: 97.8 °F (36.6 °C) (05/19/24 0703)  Pulse: 75 (05/19/24 0703)  Resp: 18 (05/19/24 0704)  BP: (!) 181/79 (05/19/24 0703)  SpO2: 97 % (05/19/24 0703) Vital Signs (24h Range):  Temp:  [97.6 °F (36.4 °C)-98.2 °F (36.8 °C)] 97.8 °F (36.6 °C)  Pulse:  [72-93] 75  Resp:  [18] 18  SpO2:  [96 %-99 %] 97 %  BP: (130-181)/(71-95) 181/79     Weight: 87.1 kg (192 lb)  Height:  "5' 10" (177.8 cm)  Body mass index is 27.55 kg/m².      Intake/Output Summary (Last 24 hours) at 5/19/2024 0813  Last data filed at 5/18/2024 2046  Gross per 24 hour   Intake 520 ml   Output --   Net 520 ml       Ortho/SPM Exam    Significant Labs: All pertinent labs within the past 24 hours have been reviewed.    Significant Imaging: I have reviewed and interpreted all pertinent imaging results/findings.    Assessment/Plan:  33-year-old male status post left carpal and cubital tunnel release, with a 3 day history of opening, drainage over the left wrist incision.  Plan 1. Patient was seen examined chart reviewed on the hospital floor.  His sutures have been removed, his wound was lightly debrided and cleaned at the bedside.  2.  I have discussed this with Dr. Singh, patient continue his IV antibiotics.  3.  We will plan for a formal surgical washout tomorrow with Dr. Palacio/Francisco.  4. Patient will be NPO after midnight.     Active Diagnoses:    Diagnosis Date Noted POA    PRINCIPAL PROBLEM:  Infection of superficial incisional surgical site after procedure [T81.41XA] 05/18/2024 Yes      Problems Resolved During this Admission:       Thank you for your consult.       Patrick Daniel MD  Orthopedics  [facility] - Orthopaedics        "

## 2024-05-20 VITALS
OXYGEN SATURATION: 99 % | RESPIRATION RATE: 18 BRPM | WEIGHT: 192 LBS | DIASTOLIC BLOOD PRESSURE: 73 MMHG | SYSTOLIC BLOOD PRESSURE: 125 MMHG | HEART RATE: 72 BPM | HEIGHT: 70 IN | TEMPERATURE: 98 F | BODY MASS INDEX: 27.49 KG/M2

## 2024-05-20 PROCEDURE — 25000003 PHARM REV CODE 250: Performed by: INTERNAL MEDICINE

## 2024-05-20 PROCEDURE — 99223 1ST HOSP IP/OBS HIGH 75: CPT | Mod: 24,,, | Performed by: STUDENT IN AN ORGANIZED HEALTH CARE EDUCATION/TRAINING PROGRAM

## 2024-05-20 PROCEDURE — 63600175 PHARM REV CODE 636 W HCPCS: Performed by: INTERNAL MEDICINE

## 2024-05-20 RX ORDER — HYDROCODONE BITARTRATE AND ACETAMINOPHEN 5; 325 MG/1; MG/1
1 TABLET ORAL EVERY 8 HOURS PRN
Qty: 10 TABLET | Refills: 0 | Status: SHIPPED | OUTPATIENT
Start: 2024-05-20 | End: 2024-05-30

## 2024-05-20 RX ORDER — AMOXICILLIN AND CLAVULANATE POTASSIUM 875; 125 MG/1; MG/1
1 TABLET, FILM COATED ORAL EVERY 12 HOURS
Qty: 14 TABLET | Refills: 0 | Status: SHIPPED | OUTPATIENT
Start: 2024-05-20 | End: 2024-05-27

## 2024-05-20 RX ORDER — ACETAMINOPHEN 325 MG/1
650 TABLET ORAL EVERY 6 HOURS PRN
Start: 2024-05-20 | End: 2024-05-30

## 2024-05-20 RX ADMIN — AMPICILLIN SODIUM AND SULBACTAM SODIUM 3 G: 2; 1 INJECTION, POWDER, FOR SOLUTION INTRAMUSCULAR; INTRAVENOUS at 06:05

## 2024-05-20 RX ADMIN — HYDROCODONE BITARTRATE AND ACETAMINOPHEN 1 TABLET: 5; 325 TABLET ORAL at 12:05

## 2024-05-20 RX ADMIN — HYDROCODONE BITARTRATE AND ACETAMINOPHEN 1 TABLET: 5; 325 TABLET ORAL at 06:05

## 2024-05-20 RX ADMIN — AMPICILLIN SODIUM AND SULBACTAM SODIUM 3 G: 2; 1 INJECTION, POWDER, FOR SOLUTION INTRAMUSCULAR; INTRAVENOUS at 12:05

## 2024-05-20 NOTE — NURSING
RX: scripts sent to pharmacy  Supplies: non stick guaze, ace    Educated on home care, f/u, meds., wound care, complication prevention, when to seek medical attention, ect. See AVS for specifics.     Questions/concerns addressed. Stable and ready for discharge.

## 2024-05-20 NOTE — DISCHARGE SUMMARY
"  Hospital Medicine Discharge Summary    Admit Date: 5/18/2024  Discharge Date and Time: 5/20/202410:16 AM  Admitting Physician: Noé Singh MD   Discharge Attending Physician: Noé Singh.  Primary Care Physician: Zayda Sosa MD  Consults: Dr. Palacio Orthopedic Surgery    Discharge Diagnoses:  Active Hospital Problems    Diagnosis  POA    *Infection of superficial incisional surgical site after procedure [T81.41XA]  Yes      Resolved Hospital Problems   No resolved problems to display.        Hospital Course:   Elsie Ahuja is a 33 y.o. male who  has a past medical history of ADHD (attention deficit hyperactivity disorder), Anxiety, Carpal tunnel syndrome, GERD (gastroesophageal reflux disease), and Hypertension.. The patient presented to [facility] on 5/18/2024 with a primary complaint of hand infection.  Pt had carpal/ulnar release on 5/7/24, was healing fine.  He was playing with his dog and was accidentally bit in the incision.  The wound has had increasing swelling/pain prompting visit to ED.  No f/c   Hand incision looks better, no f/c.  Seen by ortho rec oral antibiotic augmentin and will see at [facility] Friday 24th.  Dc to home.    /75   Pulse 62   Temp 98 °F (36.7 °C) (Oral)   Resp 18   Ht 5' 10" (1.778 m)   Wt 87.1 kg (192 lb)   SpO2 100%   BMI 27.55 kg/m²    Physical Exam  Eyes:      Extraocular Movements: Extraocular movements intact.      Pupils: Pupils are equal, round, and reactive to light.   Cardiovascular:      Rate and Rhythm: Normal rate and regular rhythm.      Heart sounds: Normal heart sounds.   Pulmonary:      Breath sounds: Normal breath sounds.   Abdominal:      General: Bowel sounds are normal.      Palpations: Abdomen is soft.   Musculoskeletal:         General: Normal range of motion.      Cervical back: Neck supple.      Comments: Left palm incision mild erythema/tenderness   Neurological:      Mental Status: He is alert.          Procedures Performed: No " admission procedures for hospital encounter.     Significant Diagnostic Studies: See Full reports for all details  Admission on 05/18/2024   Component Date Value Ref Range Status    Sodium 05/18/2024 140  136 - 145 mmol/L Final    Potassium 05/18/2024 4.2  3.5 - 5.1 mmol/L Final    Chloride 05/18/2024 106  98 - 107 mmol/L Final    CO2 05/18/2024 26  22 - 29 mmol/L Final    Glucose 05/18/2024 73 (L)  74 - 100 mg/dL Final    Blood Urea Nitrogen 05/18/2024 11.5  8.9 - 20.6 mg/dL Final    Creatinine 05/18/2024 0.89  0.73 - 1.18 mg/dL Final    Calcium 05/18/2024 9.6  8.4 - 10.2 mg/dL Final    Protein Total 05/18/2024 6.9  6.4 - 8.3 gm/dL Final    Albumin 05/18/2024 3.8  3.5 - 5.0 g/dL Final    Globulin 05/18/2024 3.1  2.4 - 3.5 gm/dL Final    Albumin/Globulin Ratio 05/18/2024 1.2  1.1 - 2.0 ratio Final    Bilirubin Total 05/18/2024 0.3  <=1.5 mg/dL Final    ALP 05/18/2024 83  40 - 150 unit/L Final    ALT 05/18/2024 60 (H)  0 - 55 unit/L Final    AST 05/18/2024 89 (H)  5 - 34 unit/L Final    eGFR 05/18/2024 >60  mL/min/1.73/m2 Final    Anion Gap 05/18/2024 8.0  mEq/L Final    BUN/Creatinine Ratio 05/18/2024 13   Final    Lactic Acid Level 05/18/2024 1.7  0.5 - 2.2 mmol/L Final    Blood Culture 05/18/2024 No Growth At 24 Hours   Preliminary    Blood Culture 05/18/2024 No Growth At 24 Hours   Preliminary    Sed Rate 05/18/2024 10  0 - 15 mm/hr Final    C-Reactive Protein High Sensitivity 05/18/2024 1.93  <=5.00 mg/L Final    WBC 05/18/2024 10.35  4.50 - 11.50 x10(3)/mcL Final    RBC 05/18/2024 4.90  4.70 - 6.10 x10(6)/mcL Final    Hgb 05/18/2024 14.0  14.0 - 18.0 g/dL Final    Hct 05/18/2024 42.9  42.0 - 52.0 % Final    MCV 05/18/2024 87.6  80.0 - 94.0 fL Final    MCH 05/18/2024 28.6  27.0 - 31.0 pg Final    MCHC 05/18/2024 32.6 (L)  33.0 - 36.0 g/dL Final    RDW 05/18/2024 13.0  11.5 - 17.0 % Final    Platelet 05/18/2024 454 (H)  130 - 400 x10(3)/mcL Final    MPV 05/18/2024 10.0  7.4 - 10.4 fL Final    Neut % 05/18/2024  61.0  % Final    Lymph % 05/18/2024 20.1  % Final    Mono % 05/18/2024 15.5  % Final    Eos % 05/18/2024 2.3  % Final    Basophil % 05/18/2024 0.7  % Final    Lymph # 05/18/2024 2.08  0.6 - 4.6 x10(3)/mcL Final    Neut # 05/18/2024 6.32  2.1 - 9.2 x10(3)/mcL Final    Mono # 05/18/2024 1.60 (H)  0.1 - 1.3 x10(3)/mcL Final    Eos # 05/18/2024 0.24  0 - 0.9 x10(3)/mcL Final    Baso # 05/18/2024 0.07  <=0.2 x10(3)/mcL Final    IG# 05/18/2024 0.04  0 - 0.04 x10(3)/mcL Final    IG% 05/18/2024 0.4  % Final    NRBC% 05/18/2024 0.0  % Final    Hemoglobin A1c 05/18/2024 4.9  <=7.0 % Final    If patient is diabetic and not done in past 6 week    Estimated Average Glucose 05/18/2024 93.9  mg/dL Final    Sodium 05/19/2024 140  136 - 145 mmol/L Final    Potassium 05/19/2024 3.9  3.5 - 5.1 mmol/L Final    Chloride 05/19/2024 108 (H)  98 - 107 mmol/L Final    CO2 05/19/2024 26  22 - 29 mmol/L Final    Glucose 05/19/2024 114 (H)  74 - 100 mg/dL Final    Blood Urea Nitrogen 05/19/2024 11.8  8.9 - 20.6 mg/dL Final    Creatinine 05/19/2024 0.84  0.73 - 1.18 mg/dL Final    Calcium 05/19/2024 9.4  8.4 - 10.2 mg/dL Final    Protein Total 05/19/2024 6.3 (L)  6.4 - 8.3 gm/dL Final    Albumin 05/19/2024 3.4 (L)  3.5 - 5.0 g/dL Final    Globulin 05/19/2024 2.9  2.4 - 3.5 gm/dL Final    Albumin/Globulin Ratio 05/19/2024 1.2  1.1 - 2.0 ratio Final    Bilirubin Total 05/19/2024 0.1  <=1.5 mg/dL Final    ALP 05/19/2024 77  40 - 150 unit/L Final    ALT 05/19/2024 57 (H)  0 - 55 unit/L Final    AST 05/19/2024 79 (H)  5 - 34 unit/L Final    eGFR 05/19/2024 >60  mL/min/1.73/m2 Final    Anion Gap 05/19/2024 6.0  mEq/L Final    BUN/Creatinine Ratio 05/19/2024 14   Final    WBC 05/19/2024 8.44  4.50 - 11.50 x10(3)/mcL Final    RBC 05/19/2024 4.52 (L)  4.70 - 6.10 x10(6)/mcL Final    Hgb 05/19/2024 13.1 (L)  14.0 - 18.0 g/dL Final    Hct 05/19/2024 40.0 (L)  42.0 - 52.0 % Final    MCV 05/19/2024 88.5  80.0 - 94.0 fL Final    MCH 05/19/2024 29.0  27.0 -  31.0 pg Final    MCHC 05/19/2024 32.8 (L)  33.0 - 36.0 g/dL Final    RDW 05/19/2024 13.2  11.5 - 17.0 % Final    Platelet 05/19/2024 411 (H)  130 - 400 x10(3)/mcL Final    MPV 05/19/2024 10.0  7.4 - 10.4 fL Final    Neut % 05/19/2024 60.7  % Final    Lymph % 05/19/2024 19.5  % Final    Mono % 05/19/2024 13.2  % Final    Eos % 05/19/2024 5.1  % Final    Basophil % 05/19/2024 1.1  % Final    Lymph # 05/19/2024 1.65  0.6 - 4.6 x10(3)/mcL Final    Neut # 05/19/2024 5.13  2.1 - 9.2 x10(3)/mcL Final    Mono # 05/19/2024 1.11  0.1 - 1.3 x10(3)/mcL Final    Eos # 05/19/2024 0.43  0 - 0.9 x10(3)/mcL Final    Baso # 05/19/2024 0.09  <=0.2 x10(3)/mcL Final    IG# 05/19/2024 0.03  0 - 0.04 x10(3)/mcL Final    IG% 05/19/2024 0.4  % Final    NRBC% 05/19/2024 0.0  % Final        X-Ray Wrist Complete Left    Result Date: 5/18/2024  EXAMINATION: XR WRIST COMPLETE 3 VIEWS LEFT CLINICAL HISTORY: Pain in left wrist TECHNIQUE: Radiographs of the left wrist with AP, lateral and oblique  views. COMPARISON: No prior imaging available for comparison FINDINGS: There is no acute fracture, subluxation or dislocation. Joints and interspaces appear maintained. Osseous structures show normal bone mineral density. Soft tissues are unremarkable. There are no radiopaque foreign bodies.     No acute osseous abnormality, fracture, or dislocation. There is no significant degenerative change. Electronically signed by: John Shepard Date:    05/18/2024 Time:    13:02    X-Ray Chest PA And Lateral    Result Date: 4/29/2024  EXAMINATION: XR CHEST PA AND LATERAL CLINICAL HISTORY: , Carpal tunnel syndrome, bilateral upper limbs. COMPARISON: October 17, 2021 FINDINGS: No alveolar consolidation, effusion, or pneumothorax is seen.   The thoracic aorta is normal  cardiac silhouette, central pulmonary vessels and mediastinum are normal in size and are grossly unremarkable.   visualized osseous structures are grossly unremarkable.     No acute chest disease is  identified. Electronically signed by: William Harris Date:    04/29/2024 Time:    09:31      Microbiology Results (last 7 days)       Procedure Component Value Units Date/Time    Blood culture #1 **CANNOT BE ORDERED STAT** [[id number]]  (Normal) Collected: 05/18/24 1206    Order Status: Completed Specimen: Blood from Arm, Right Updated: 05/19/24 1701     Blood Culture No Growth At 24 Hours    Blood culture #2 **CANNOT BE ORDERED STAT** [[id number]]  (Normal) Collected: 05/18/24 1210    Order Status: Completed Specimen: Blood from Antecubital, Right Updated: 05/19/24 1701     Blood Culture No Growth At 24 Hours                Medication List        START taking these medications      acetaminophen 325 MG tablet  Commonly known as: TYLENOL  Take 2 tablets (650 mg total) by mouth every 6 (six) hours as needed for Pain. Maximum dose of acetaminophen is 3000 mg from all sources in 24 hours, 2000 mg in hepatic failure patients     amoxicillin-clavulanate 875-125mg 875-125 mg per tablet  Commonly known as: AUGMENTIN  Take 1 tablet by mouth every 12 (twelve) hours. for 7 days            CHANGE how you take these medications      HYDROcodone-acetaminophen 5-325 mg per tablet  Commonly known as: NORCO  Take 1 tablet by mouth every 8 (eight) hours as needed for Pain.  What changed: when to take this            CONTINUE taking these medications      busPIRone 5 MG Tab  Commonly known as: BUSPAR     lisinopriL-hydrochlorothiazide 10-12.5 mg per tablet  Commonly known as: PRINZIDE,ZESTORETIC  Take 1 tablet by mouth once daily.     pantoprazole 40 MG tablet  Commonly known as: PROTONIX  Take 1 tablet (40 mg total) by mouth once daily.     VYVANSE 50 mg capsule  Generic drug: lisdexamfetamine               Where to Get Your Medications        These medications were sent to [id number] - [place] - [address]  [address] [address]      Phone: [phone]    amoxicillin-clavulanate 875-125mg 875-125 mg per tablet  HYDROcodone-acetaminophen 5-325 mg per tablet       Information about where to get these medications is not yet available    Ask your nurse or doctor about these medications  acetaminophen 325 MG tablet         Explained in detail to the patient about the discharge plan, medications, and follow-up visits. Pt understands and agrees with the treatment plan  Discharged Condition: stable  Medications Per DC med rec  Activities as tolerated  Follow-up dr dexter 5/24 [facility]  For further questions contact hospitalist office    Discharge time 30 minutes    For worsening symptoms, chest pain, shortness of breath, increased abdominal pain, high grade fever, stroke or stroke like symptoms, immediately go to the nearest Emergency Room or call 911 as soon as possible.      Noé David M.D, on 5/20/2024. at 10:16 AM.

## 2024-05-20 NOTE — UM SECONDARY REVIEW
33-year-old male who underwent left carpal tunnel and cubital tunnel release on May 7th this year presented with some wound breakdown.  Found to have wound dehiscence.  Placed on broad-spectrum anti-infective therapy.  On admission, hypertensive in the 160s.  There was also consideration for confounding dog bite.  There is a consideration for possible surgical washout.  In the setting of a continuation of need for broad-spectrum anti-infective therapy, possible invasive intervention, would support continuation IP LOC       Candido Connor MD  Utilization Management  Physician Advisor  hospitals Medicine  05/20/2024

## 2024-05-20 NOTE — CONSULTS
Hand surgery consult note    Chief Complaint:  Wound dehiscence left palm    Consulting Physician: Self, Aaarefhowie    History of present illness:    Patient is a 33-year-old male who underwent left carpal tunnel and cubital tunnel release surgeries on 05/07/2024.  He had been doing well until he noticed some wound breakdown of his palm incision.  He is noticed pain and swelling over the last 3 days.  He was playing with his dog and had a traumatic event.  [facility] Unasyn.  This has been improving.  No fevers or chills    Past Medical History:   Diagnosis Date    ADHD (attention deficit hyperactivity disorder)     Anxiety     Carpal tunnel syndrome     GERD (gastroesophageal reflux disease)     Hypertension        Past Surgical History:   Procedure Laterality Date    CARPAL TUNNEL RELEASE Left 5/7/2024    Procedure: RELEASE, CARPAL TUNNEL;  Surgeon: Nura Palacio MD;  Location: [facility];  Service: Orthopedics;  Laterality: Left;    SPLENECTOMY, TOTAL      ULNAR NERVE TRANSPOSITION Left 5/7/2024    Procedure: TRANSPOSITION, NERVE, ULNAR;  Surgeon: Nura Palacio MD;  Location: [facility] OR;  Service: Orthopedics;  Laterality: Left;       Current Facility-Administered Medications   Medication    acetaminophen tablet 650 mg    ampicillin-sulbactam (UNASYN) 3 g in sodium chloride 0.9 % 100 mL IVPB (MB+)    busPIRone tablet 5 mg    lisinopriL tablet 10 mg    And    hydroCHLOROthiazide tablet 12.5 mg    HYDROcodone-acetaminophen 5-325 mg per tablet 1 tablet    melatonin tablet 6 mg    sodium chloride 0.9% flush 10 mL       Review of patient's allergies indicates:   Allergen Reactions    Sulfa (sulfonamide antibiotics) Nausea And Vomiting and Nausea Only       Family History   Problem Relation Name Age of Onset    Arthritis Mother Meghann Ahuja     Depression Mother Meghann Ahuja     Hyperlipidemia Mother Meghann Ahuja     Hypertension Mother Meghann Ahuja     Arthritis  Father Amando Ahuja     Diabetes Father Amando Ahuja     Hearing loss Father Amando Ahuja     Hyperlipidemia Father Amando Ahuja     Hypertension Father Amando Ahuja        Social History     Socioeconomic History    Marital status:    Tobacco Use    Smoking status: Former     Current packs/day: 0.25     Average packs/day: 0.3 packs/day for 5.0 years (1.3 ttl pk-yrs)     Types: Cigarettes    Smokeless tobacco: Never    Tobacco comments:     Smokes socially. Less than 1 pack in 6 months    Substance and Sexual Activity    Alcohol use: Not Currently    Drug use: Not Currently     Types: Marijuana    Sexual activity: Yes     Partners: Female     Birth control/protection: I.U.D.     Social Determinants of Health     Financial Resource Strain: Low Risk  (4/1/2024)    Overall Financial Resource Strain (CARDIA)     Difficulty of Paying Living Expenses: Not very hard   Food Insecurity: No Food Insecurity (4/1/2024)    Hunger Vital Sign     Worried About Running Out of Food in the Last Year: Never true     Ran Out of Food in the Last Year: Never true   Transportation Needs: No Transportation Needs (4/1/2024)    PRAPARE - Transportation     Lack of Transportation (Medical): No     Lack of Transportation (Non-Medical): No   Physical Activity: Sufficiently Active (4/1/2024)    Exercise Vital Sign     Days of Exercise per Week: 7 days     Minutes of Exercise per Session: 90 min   Stress: Stress Concern Present (4/1/2024)    [employer] - Occupational Stress Questionnaire     Feeling of Stress : To some extent   Housing Stability: Low Risk  (4/1/2024)    Housing Stability Vital Sign     Unable to Pay for Housing in the Last Year: No     Number of Places Lived in the Last Year: 1     Unstable Housing in the Last Year: No       Review of Systems:    Constitution:   Denies chills, fever, and sweats.  HENT:   Denies headaches or blurry vision.  Cardiovascular:  Denies chest pain or irregular heart  beat.  Respiratory:   Denies cough or shortness of breath.  Gastrointestinal:  Denies abdominal pain, nausea, or vomiting.  Musculoskeletal:   Denies muscle cramps.  Neurological:   Denies dizziness or focal weakness.  Psychiatric/Behavior: Normal mental status.  Hematology/Lymph:  Denies bleeding problem or easy bruising/bleeding.  Skin:    Denies rash or suspicious lesions.    Examination:    Vital Signs:    Vitals:    05/20/24 0031 05/20/24 0503 05/20/24 0650 05/20/24 0658   BP:  (!) 143/70  132/75   Pulse:  67  62   Resp: 18 17 18    Temp:  98 °F (36.7 °C)  98 °F (36.7 °C)   TempSrc:  Oral  Oral   SpO2:  96%  100%   Weight:       Height:           Body mass index is 27.55 kg/m².    Constitution:   Well-developed, well nourished patient in no acute distress.  Neurological:   Alert and oriented x 3 and cooperative to examination.     Psychiatric/Behavior: Normal mental status.  Respiratory:   No shortness of breath.  Eyes:    Extraoccular muscles intact  Skin:    No scars, rash or suspicious lesions.    MSK:   Left hand:  Carpal tunnel surgical incision is macerated with wound breakdown.  There has no tenderness to palpation around the incision.  There has no fluctuance or expressible drainage or pus from this incision.  This appears to be superficial only.  Sensation light touch intact in median ulnar radial distribution.  The elbow incision is healing well with sutures in place no evidence of dehiscence or infection.  Radial pulse 2 +hand is warm well perfused        Imaging:   X-ray of the left wrist three views shows no fractures    CRP is 1.93, ESR is 10, white blood cell count is 8.44     Assessment:  Wound dehiscence left palm    Plan:  I do not think he has a deep infection.  I do not think he needs a surgical washout.  He can be discharged on p.o. antibiotics.  He can be discharged on Augmentin and follow up with me at the [facility] on Friday 05/24/2024.  If this worsens he should come  back and see me in clinic sooner.  I will also be at the [facility] on 05/22/2024    Follow Up:  At [facility] 05/24/2024  Xray at next visit:  None

## 2024-05-20 NOTE — DISCHARGE INSTRUCTIONS
Wound care: Keep wound covered until you see Dr. Palacio on Friday. It is ok to changed guaze every other day and re wrap with ACE bandage.

## 2024-05-20 NOTE — PLAN OF CARE
Problem: Adult Inpatient Plan of Care  Goal: Plan of Care Review  Outcome: Progressing  Flowsheets (Taken 5/19/2024 2153)  Plan of Care Reviewed With: patient  Goal: Absence of Hospital-Acquired Illness or Injury  Outcome: Progressing  Intervention: Prevent Infection  Flowsheets (Taken 5/19/2024 2153)  Infection Prevention:   rest/sleep promoted   single patient room provided   hand hygiene promoted     Problem: Wound  Goal: Optimal Functional Ability  Outcome: Progressing  Intervention: Optimize Functional Ability  Flowsheets (Taken 5/19/2024 2153)  Activity Management: Ambulated -L4  Activity Assistance Provided: independent  Goal: Absence of Infection Signs and Symptoms  Outcome: Progressing     Problem: Pain Acute  Goal: Optimal Pain Control and Function  Outcome: Progressing  Intervention: Develop Pain Management Plan  Flowsheets (Taken 5/19/2024 2153)  Pain Management Interventions: pain management plan reviewed with patient/caregiver

## 2024-05-21 ENCOUNTER — PATIENT OUTREACH (OUTPATIENT)
Dept: ADMINISTRATIVE | Facility: CLINIC | Age: 34
End: 2024-05-21
Payer: COMMERCIAL

## 2024-05-21 NOTE — PROGRESS NOTES
C3 nurse attempted to contact Elsie Ahuja  for a TCC post hospital discharge follow up call. No answer. Left voicemail with callback information. The patient has a scheduled HOSFU appointment with Dr Palacio on Friday 5/24/24 @ 8:10am.

## 2024-05-22 NOTE — PROGRESS NOTES
C3 nurse spoke with Elsie Ahuja  for a TCC post hospital discharge follow up call. The patient has a scheduled HOSFU appointment with Dr Palacio on Friday 5/24/24 @ 8:10am

## 2024-05-23 ENCOUNTER — PATIENT MESSAGE (OUTPATIENT)
Dept: ORTHOPEDICS | Facility: CLINIC | Age: 34
End: 2024-05-23
Payer: COMMERCIAL

## 2024-05-23 ENCOUNTER — TELEPHONE (OUTPATIENT)
Dept: ORTHOPEDICS | Facility: CLINIC | Age: 34
End: 2024-05-23
Payer: COMMERCIAL

## 2024-05-23 ENCOUNTER — NURSE TRIAGE (OUTPATIENT)
Dept: ADMINISTRATIVE | Facility: CLINIC | Age: 34
End: 2024-05-23
Payer: COMMERCIAL

## 2024-05-23 LAB
BACTERIA BLD CULT: NORMAL
BACTERIA BLD CULT: NORMAL

## 2024-05-23 NOTE — TELEPHONE ENCOUNTER
Patient called and I received a message stating that he has fever 102.3, chills, body aches, headache, soar throat, and a stiff neck.

## 2024-05-23 NOTE — TELEPHONE ENCOUNTER
Reason for Disposition   Headache and stiff neck (can't touch chin to chest)    Additional Information   Negative: Life-threatening allergic reaction (anaphylaxis) suspected   Negative: Tongue swelling is main symptom   Negative: Lip swelling is main symptom   Negative: Face swelling   Negative: Pale cold skin and very weak (can't stand)   Negative: Difficult to awaken or acting confused (e.g., disoriented, slurred speech)   Negative: Difficulty breathing and bluish (or gray) lips or face   Negative: New-onset rash with purple (or blood-colored) spots or dots   Negative: Sounds like a life-threatening emergency to the triager    Protocols used: Allergic Reactions - Guideline [id number], Fever-A-OH  Pt states he was recently hospitalized for a post operative infection at his incision site. States he is now home and thinks he is having an allergic reaction to antibiotics. Pt states he has fever of 102, chills and body aches. Denies difficulty breathing or facial swelling. States he has a headache and a stiff neck. Pt advised Per the Triage protocol to have someone bring him to the ED now. Pt verbalized understanding.

## 2024-05-23 NOTE — PROGRESS NOTES
[facility]  Established Patient Office Visit  05/22/2024       Patient ID: Elsie Ahuja  YOB: 1990  MRN: [id number]    Chief Complaint: No chief complaint on file.      Orthopaedic Surgeries:   Left endo cubital tunnel release, carpal tunnel release 5/7/24    HPI:  Elsie Ahuja is a 33 y.o. male with above. Post op course was complicated by some surgical wound dehiscence of his carpal tunnel incision. He was briefly admitted during which he received IV abx. Discharged on PO. Here today for wound check.     12 point ROS performed and negative except as above.     Past Medical History:    Past Medical History:   Diagnosis Date    ADHD (attention deficit hyperactivity disorder)     Anxiety     Carpal tunnel syndrome     GERD (gastroesophageal reflux disease)     Hypertension      Past Surgical History:   Procedure Laterality Date    CARPAL TUNNEL RELEASE Left 5/7/2024    Procedure: RELEASE, CARPAL TUNNEL;  Surgeon: Nura Palacio MD;  Location: [facility] OR;  Service: Orthopedics;  Laterality: Left;    SPLENECTOMY, TOTAL      ULNAR NERVE TRANSPOSITION Left 5/7/2024    Procedure: TRANSPOSITION, NERVE, ULNAR;  Surgeon: Nura Palacio MD;  Location: [facility] OR;  Service: Orthopedics;  Laterality: Left;     Family History   Problem Relation Name Age of Onset    Arthritis Mother Meghann Ahuja     Depression Mother Meghann Ahuja     Hyperlipidemia Mother Meghann Ahuja     Hypertension Mother Meghann Ahuja     Arthritis Father Amando Ahuja     Diabetes Father Amando Ahuja     Hearing loss Father Amando Ahuja     Hyperlipidemia Father Amando Ahuja     Hypertension Father Amando Ahuja      Social History     Socioeconomic History    Marital status:    Tobacco Use    Smoking status: Former     Current packs/day: 0.25     Average packs/day: 0.3 packs/day for 5.0 years (1.3 ttl pk-yrs)     Types: Cigarettes    Smokeless tobacco: Never    Tobacco comments:     Smokes socially. Less than  1 pack in 6 months    Substance and Sexual Activity    Alcohol use: Not Currently    Drug use: Not Currently     Types: Marijuana    Sexual activity: Yes     Partners: Female     Birth control/protection: I.U.D.     Social Determinants of Health     Financial Resource Strain: Low Risk  (4/1/2024)    Overall Financial Resource Strain (CARDIA)     Difficulty of Paying Living Expenses: Not very hard   Food Insecurity: No Food Insecurity (4/1/2024)    Hunger Vital Sign     Worried About Running Out of Food in the Last Year: Never true     Ran Out of Food in the Last Year: Never true   Transportation Needs: No Transportation Needs (4/1/2024)    PRAPARE - Transportation     Lack of Transportation (Medical): No     Lack of Transportation (Non-Medical): No   Physical Activity: Sufficiently Active (4/1/2024)    Exercise Vital Sign     Days of Exercise per Week: 7 days     Minutes of Exercise per Session: 90 min   Stress: Stress Concern Present (4/1/2024)    [employer] - Occupational Stress Questionnaire     Feeling of Stress : To some extent   Housing Stability: Low Risk  (4/1/2024)    Housing Stability Vital Sign     Unable to Pay for Housing in the Last Year: No     Number of Places Lived in the Last Year: 1     Unstable Housing in the Last Year: No     Medication List with Changes/Refills   Current Medications    ACETAMINOPHEN (TYLENOL) 325 MG TABLET    Take 2 tablets (650 mg total) by mouth every 6 (six) hours as needed for Pain. Maximum dose of acetaminophen is 3000 mg from all sources in 24 hours, 2000 mg in hepatic failure patients    AMOXICILLIN-CLAVULANATE 875-125MG (AUGMENTIN) 875-125 MG PER TABLET    Take 1 tablet by mouth every 12 (twelve) hours. for 7 days    BUSPIRONE (BUSPAR) 5 MG TAB    take one tablet by mouth two times daily for anxiety    HYDROCODONE-ACETAMINOPHEN (NORCO) 5-325 MG PER TABLET    Take 1 tablet by mouth every 8 (eight) hours as needed for Pain.     LISINOPRIL-HYDROCHLOROTHIAZIDE (PRINZIDE,ZESTORETIC) 10-12.5 MG PER TABLET    Take 1 tablet by mouth once daily.    PANTOPRAZOLE (PROTONIX) 40 MG TABLET    Take 1 tablet (40 mg total) by mouth once daily.    VYVANSE 50 MG CAPSULE    Take 50 mg by mouth every morning.     Review of patient's allergies indicates:   Allergen Reactions    Sulfa (sulfonamide antibiotics) Nausea And Vomiting and Nausea Only       Physical Exam:    Left upper extremity:  Wound appears to be healing.  There has no fluctuance.  There has no drainage.  There is no purulence.  There has no tenderness to palpation around the incision.      Imaging independently interpreted:  None    Assessment and Plan:    Elsie Ahuja is a 33 y.o. male with left carpal tunnel release with delayed wound healing    His wound appears to be healing compared to when I saw him in the hospital 4 days ago.  I think we can continue watching this to see if this heals further.  He can continue the antibiotics that were given to him and I can see him in clinic next week.  No lifting pushing pulling with the left hand

## 2024-05-23 NOTE — TELEPHONE ENCOUNTER
I spoke to Dr. Palacio who instructed me to tell the patient to go see him at [facility] tomorrow morning at 8:10 AM (as scheduled already). Gave the patient the instructions of nothing to eat or drink after midnight.     I explained that if things get significantly worse during the night then he should go to the ER. Otherwise Dr. Palacio will see him in the AM at [facility]. Patient agreed with this plan and voiced a clear understanding.

## 2024-05-24 ENCOUNTER — OFFICE VISIT (OUTPATIENT)
Dept: ORTHOPEDICS | Facility: CLINIC | Age: 34
End: 2024-05-24
Payer: COMMERCIAL

## 2024-05-24 VITALS
DIASTOLIC BLOOD PRESSURE: 74 MMHG | SYSTOLIC BLOOD PRESSURE: 111 MMHG | HEIGHT: 70 IN | TEMPERATURE: 99 F | WEIGHT: 194.81 LBS | BODY MASS INDEX: 27.89 KG/M2 | OXYGEN SATURATION: 97 % | HEART RATE: 82 BPM | RESPIRATION RATE: 20 BRPM

## 2024-05-24 DIAGNOSIS — T81.41XA INFECTION OF SUPERFICIAL INCISIONAL SURGICAL SITE AFTER PROCEDURE, INITIAL ENCOUNTER: Primary | ICD-10-CM

## 2024-05-24 PROCEDURE — 3078F DIAST BP <80 MM HG: CPT | Mod: CPTII,,, | Performed by: STUDENT IN AN ORGANIZED HEALTH CARE EDUCATION/TRAINING PROGRAM

## 2024-05-24 PROCEDURE — 3044F HG A1C LEVEL LT 7.0%: CPT | Mod: CPTII,,, | Performed by: STUDENT IN AN ORGANIZED HEALTH CARE EDUCATION/TRAINING PROGRAM

## 2024-05-24 PROCEDURE — 99214 OFFICE O/P EST MOD 30 MIN: CPT | Mod: PBBFAC

## 2024-05-24 PROCEDURE — 1159F MED LIST DOCD IN RCRD: CPT | Mod: CPTII,,, | Performed by: STUDENT IN AN ORGANIZED HEALTH CARE EDUCATION/TRAINING PROGRAM

## 2024-05-24 PROCEDURE — 4010F ACE/ARB THERAPY RXD/TAKEN: CPT | Mod: CPTII,,, | Performed by: STUDENT IN AN ORGANIZED HEALTH CARE EDUCATION/TRAINING PROGRAM

## 2024-05-24 PROCEDURE — 3074F SYST BP LT 130 MM HG: CPT | Mod: CPTII,,, | Performed by: STUDENT IN AN ORGANIZED HEALTH CARE EDUCATION/TRAINING PROGRAM

## 2024-05-24 PROCEDURE — 99024 POSTOP FOLLOW-UP VISIT: CPT | Mod: ,,, | Performed by: STUDENT IN AN ORGANIZED HEALTH CARE EDUCATION/TRAINING PROGRAM

## 2024-05-30 ENCOUNTER — OFFICE VISIT (OUTPATIENT)
Dept: FAMILY MEDICINE | Facility: CLINIC | Age: 34
End: 2024-05-30
Payer: COMMERCIAL

## 2024-05-30 ENCOUNTER — TELEPHONE (OUTPATIENT)
Dept: FAMILY MEDICINE | Facility: CLINIC | Age: 34
End: 2024-05-30

## 2024-05-30 ENCOUNTER — OFFICE VISIT (OUTPATIENT)
Dept: ORTHOPEDICS | Facility: CLINIC | Age: 34
End: 2024-05-30
Payer: COMMERCIAL

## 2024-05-30 VITALS
WEIGHT: 189.19 LBS | BODY MASS INDEX: 27.09 KG/M2 | HEART RATE: 90 BPM | TEMPERATURE: 98 F | HEIGHT: 70 IN | DIASTOLIC BLOOD PRESSURE: 75 MMHG | SYSTOLIC BLOOD PRESSURE: 133 MMHG

## 2024-05-30 VITALS
HEART RATE: 101 BPM | WEIGHT: 189.19 LBS | DIASTOLIC BLOOD PRESSURE: 84 MMHG | RESPIRATION RATE: 16 BRPM | HEIGHT: 70 IN | OXYGEN SATURATION: 98 % | SYSTOLIC BLOOD PRESSURE: 138 MMHG | BODY MASS INDEX: 27.09 KG/M2 | TEMPERATURE: 99 F

## 2024-05-30 DIAGNOSIS — G56.02 LEFT CARPAL TUNNEL SYNDROME: Primary | ICD-10-CM

## 2024-05-30 DIAGNOSIS — G56.00 CARPAL TUNNEL SYNDROME, UNSPECIFIED LATERALITY: ICD-10-CM

## 2024-05-30 DIAGNOSIS — Z02.89 ENCOUNTER TO OBTAIN EXCUSE FROM WORK: ICD-10-CM

## 2024-05-30 DIAGNOSIS — Z90.81 S/P SPLENECTOMY: Primary | ICD-10-CM

## 2024-05-30 PROCEDURE — 99213 OFFICE O/P EST LOW 20 MIN: CPT | Mod: ,,,

## 2024-05-30 PROCEDURE — 3008F BODY MASS INDEX DOCD: CPT | Mod: CPTII,,,

## 2024-05-30 PROCEDURE — 3044F HG A1C LEVEL LT 7.0%: CPT | Mod: CPTII,,,

## 2024-05-30 PROCEDURE — 4010F ACE/ARB THERAPY RXD/TAKEN: CPT | Mod: CPTII,,, | Performed by: FAMILY MEDICINE

## 2024-05-30 PROCEDURE — 1160F RVW MEDS BY RX/DR IN RCRD: CPT | Mod: CPTII,,, | Performed by: FAMILY MEDICINE

## 2024-05-30 PROCEDURE — 1159F MED LIST DOCD IN RCRD: CPT | Mod: CPTII,,,

## 2024-05-30 PROCEDURE — 3075F SYST BP GE 130 - 139MM HG: CPT | Mod: CPTII,,, | Performed by: FAMILY MEDICINE

## 2024-05-30 PROCEDURE — 3044F HG A1C LEVEL LT 7.0%: CPT | Mod: CPTII,,, | Performed by: FAMILY MEDICINE

## 2024-05-30 PROCEDURE — 3008F BODY MASS INDEX DOCD: CPT | Mod: CPTII,,, | Performed by: FAMILY MEDICINE

## 2024-05-30 PROCEDURE — 1111F DSCHRG MED/CURRENT MED MERGE: CPT | Mod: CPTII,,,

## 2024-05-30 PROCEDURE — 1159F MED LIST DOCD IN RCRD: CPT | Mod: CPTII,,, | Performed by: FAMILY MEDICINE

## 2024-05-30 PROCEDURE — 1111F DSCHRG MED/CURRENT MED MERGE: CPT | Mod: CPTII,,, | Performed by: FAMILY MEDICINE

## 2024-05-30 PROCEDURE — 4010F ACE/ARB THERAPY RXD/TAKEN: CPT | Mod: CPTII,,,

## 2024-05-30 PROCEDURE — 99214 OFFICE O/P EST MOD 30 MIN: CPT | Mod: ,,, | Performed by: FAMILY MEDICINE

## 2024-05-30 PROCEDURE — 3079F DIAST BP 80-89 MM HG: CPT | Mod: CPTII,,, | Performed by: FAMILY MEDICINE

## 2024-05-30 PROCEDURE — 1160F RVW MEDS BY RX/DR IN RCRD: CPT | Mod: CPTII,,,

## 2024-05-30 RX ORDER — VELPATASVIR AND SOFOSBUVIR 100; 400 MG/1; MG/1
1 TABLET, FILM COATED ORAL
COMMUNITY

## 2024-05-30 NOTE — PROGRESS NOTES
Orthopedic Clinic - Hand    Chief Complaint: Post Op Visit    Consulting Physician: No ref. provider found    left Carpal and Cubital Tunnel Releases on 5/7/2024    History of Present Illness: Elsie Ahuja is a 33 y.o. male here today for post op CTR and cubital tunnel release visit.  Patient's postop course was complicated by some surgical wound dehiscence of his carpal tunnel incision.  He was briefly admitted on 05/18/2024 during which he received IV antibiotics.  He was discharged on 05/20/2024 and was seen at [facility] on 05/22/2024. Patient is doing well today.  He is finished his course of antibiotics.  No fevers or chills.  Denies drainage from his incision.  Numbness in hand is improving. Night time symptoms improving.        Past Medical History:   Diagnosis Date    ADHD (attention deficit hyperactivity disorder)     Anxiety     Carpal tunnel syndrome     GERD (gastroesophageal reflux disease)     Hypertension         Past Surgical History:   Procedure Laterality Date    CARPAL TUNNEL RELEASE Left 5/7/2024    Procedure: RELEASE, CARPAL TUNNEL;  Surgeon: Nura Palacio MD;  Location: [facility] OR;  Service: Orthopedics;  Laterality: Left;    SPLENECTOMY, TOTAL      ULNAR NERVE TRANSPOSITION Left 5/7/2024    Procedure: TRANSPOSITION, NERVE, ULNAR;  Surgeon: Nura Palacio MD;  Location: [facility] OR;  Service: Orthopedics;  Laterality: Left;        Social History     Tobacco Use    Smoking status: Former     Current packs/day: 0.25     Average packs/day: 0.3 packs/day for 5.0 years (1.3 ttl pk-yrs)     Types: Cigarettes    Smokeless tobacco: Never    Tobacco comments:     Smokes socially. Less than 1 pack in 6 months    Substance and Sexual Activity    Alcohol use: Not Currently    Drug use: Not Currently     Types: Marijuana    Sexual activity: Yes     Partners: Female     Birth control/protection: I.U.D.        Current Outpatient Medications   Medication Instructions    busPIRone (BUSPAR) 5 MG Tab take one tablet by  "mouth two times daily for anxiety    lisinopriL-hydrochlorothiazide (PRINZIDE,ZESTORETIC) 10-12.5 mg per tablet 1 tablet, Oral, Daily    pantoprazole (PROTONIX) 40 mg, Oral, Daily    VYVANSE 50 mg, Oral, Every morning       Review of patient's allergies indicates:   Allergen Reactions    Sulfa (sulfonamide antibiotics) Nausea And Vomiting and Nausea Only        Patient Care Team:  Zayda Sosa MD as PCP - General (Family Medicine)  Christy Gay MD (Psychiatry)  Andrei Momin MD as Consulting Physician (Family Medicine)  Jimbo Balderas MD as Consulting Physician (Gastroenterology)     Review of Systems:    Constitution:   Denies chills, fever, and sweats.  HENT:   Denies headaches or blurry vision.  Cardiovascular:   Denies chest pain or irregular heart beat.  Respiratory:   Denies cough or shortness of breath.  Gastrointestinal:  Denies abdominal pain, nausea, or vomiting.  Musculoskeletal:   Denies muscle cramps.  Neurological:   Denies dizziness or focal weakness.  Psychiatric/Behavior: Normal mental status.  Hematology/Lymph:  Denies bleeding problem or easy bruising/bleeding.  Skin:    Denies rash or suspicious lesions.    Physical Examination:    Vital Signs:    Vitals:    24 0809   Weight: 85.8 kg (189 lb 3.2 oz)   Height: 5' 10" (1.778 m)   Body mass index is 27.15 kg/m².    Constitution:   Well-developed, well nourished patient in no acute distress.  Neurological:   Alert and oriented x 3 and cooperative to examination.     Psychiatric/Behavior: Normal mental status.  Respiratory:   No shortness of breath. Non-labored breathing.  Eyes:    Extraoccular muscles intact.    Musculoskeletal Examination:   left Upper Extremity:  Surgical incision is healing with no evidence of infection.  Sensation to light touch in median and ulnar nerve distribution.  Radial pulses 2+ hand is warm well perfused.        Imagin}  No new imaging.     Assessment:  Post left carpal tunnel and " cubital tunnel release    Plan:  Patient is doing well. Patient was counseled on scar massage after incision is fully healed.  I educated the patient that full nerve recovery may take anywhere from 12-18 months.    Follow Up: No follow-ups on file. 274}    X-Ray at Next Visit: None

## 2024-05-30 NOTE — PROGRESS NOTES
Subjective:        Patient ID: Elsie Ahuja is a 33 y.o. male.    Chief Complaint: Follow-up (Work release )      Patient presents to clinic unaccompanied for work release.  He is due for his wellness visit in march     He is s/p splenectomy 2/2 rupture in [place] 3/16/2024. No trauma or injury. Not sure why it happened.  Got pneumonia shot while inpatient.  Staples removed 4/2024. Still having slight discomfort between belly button and incision.  Patient had US (Monday 04/15/2023) and has follow up with GI (Dr. Balderas).  Had EGD on 04/30/2024. Was good. Is on ppi.   Will be starting treatment for hep c with him.  Denies fever and having regular bowel movements.   He is an .   He has not been at work since. Does not do heavy lifting.  He denies any abdominal pain.  Has been going to gym.  Feels ready to go back to work.  Does not have any form to complete.  States got pneumonia shot while in [place]. Will bring by copy of record     Had left carpal tunnel/ulnar nerve release surgery with dr. Palacio 5/7/24. Had infection after a dog bite and was admitted 5/18/24-5/20/24. Saw him 5/24/24 for follow up.  Advised no lifting/pulling with left hand and follow up in 1 week.                  Hep c screening was positive in 3/2023.      He has htn. Reports compliance with meds.       has gerd.  On ppi.       He has anxiety and adhd.  Is on vyvanse and buspar.  Follows with psych, dr. Christy mejia.  Sees her q3 months.       He follows with dr. Momin for hormones.  Is no longer seeing him so not taking meds.   Will establish with urology     He is not allergic to any medications. He smokes cigarettes- has not smoked in a while.        Has family history of diabetes in both parents. No family history of colon cancer. Is in [employer].                Review of Systems   Constitutional: Negative.    HENT: Negative.     Eyes: Negative.    Respiratory: Negative.     Cardiovascular: Negative.    Gastrointestinal:  "Negative.    Endocrine: Negative.    Genitourinary: Negative.    Musculoskeletal: Negative.    Skin: Negative.    Allergic/Immunologic: Negative.    Neurological: Negative.    Hematological: Negative.    Psychiatric/Behavioral: Negative.     All other systems reviewed and are negative.        Review of patient's allergies indicates:   Allergen Reactions    Sulfa (sulfonamide antibiotics) Nausea And Vomiting and Nausea Only      Vitals:    05/30/24 1429   BP: 138/84   BP Location: Left arm   Pulse: 101   Resp: 16   Temp: 98.6 °F (37 °C)   TempSrc: Temporal   SpO2: 98%   Weight: 85.8 kg (189 lb 3.2 oz)   Height: 5' 10" (1.778 m)      Social History     Socioeconomic History    Marital status:    Tobacco Use    Smoking status: Former     Current packs/day: 0.25     Average packs/day: 0.3 packs/day for 5.0 years (1.3 ttl pk-yrs)     Types: Cigarettes    Smokeless tobacco: Never    Tobacco comments:     Smokes socially. Less than 1 pack in 6 months    Substance and Sexual Activity    Alcohol use: Not Currently    Drug use: Not Currently     Types: Marijuana    Sexual activity: Yes     Partners: Female     Birth control/protection: I.U.D.     Social Determinants of Health     Financial Resource Strain: Low Risk  (4/1/2024)    Overall Financial Resource Strain (CARDIA)     Difficulty of Paying Living Expenses: Not very hard   Food Insecurity: No Food Insecurity (4/1/2024)    Hunger Vital Sign     Worried About Running Out of Food in the Last Year: Never true     Ran Out of Food in the Last Year: Never true   Transportation Needs: No Transportation Needs (4/1/2024)    PRAPARE - Transportation     Lack of Transportation (Medical): No     Lack of Transportation (Non-Medical): No   Physical Activity: Sufficiently Active (4/1/2024)    Exercise Vital Sign     Days of Exercise per Week: 7 days     Minutes of Exercise per Session: 90 min   Stress: Stress Concern Present (4/1/2024)    [facility] " Occupational Health - Occupational Stress Questionnaire     Feeling of Stress : To some extent   Housing Stability: Low Risk  (4/1/2024)    Housing Stability Vital Sign     Unable to Pay for Housing in the Last Year: No     Number of Places Lived in the Last Year: 1     Unstable Housing in the Last Year: No      Family History   Problem Relation Name Age of Onset    Arthritis Mother Meghann Ahuja     Depression Mother Meghann Ahuja     Hyperlipidemia Mother Meghann Ahuja     Hypertension Mother Meghann Ahuja     Arthritis Father Amando Ahuja     Diabetes Father Amando Ahuja     Hearing loss Father Amando Ahuja     Hyperlipidemia Father Amando Ahuja     Hypertension Father Amando Ahuja           Objective:     Physical Exam  Vitals and nursing note reviewed.   Constitutional:       Appearance: Normal appearance. He is normal weight.   HENT:      Head: Normocephalic.      Nose: Nose normal.      Mouth/Throat:      Mouth: Mucous membranes are moist.      Pharynx: Oropharynx is clear.   Eyes:      Extraocular Movements: Extraocular movements intact.   Cardiovascular:      Rate and Rhythm: Normal rate and regular rhythm.   Pulmonary:      Effort: Pulmonary effort is normal.      Breath sounds: Normal breath sounds.   Musculoskeletal:         General: Normal range of motion.   Skin:     General: Skin is warm and dry.   Neurological:      General: No focal deficit present.      Mental Status: He is alert and oriented to person, place, and time. Mental status is at baseline.   Psychiatric:         Mood and Affect: Mood normal.     Current Outpatient Medications on File Prior to Visit   Medication Sig Dispense Refill    busPIRone (BUSPAR) 5 MG Tab take one tablet by mouth two times daily for anxiety      EPCLUSA 400-100 mg Tab Take 1 tablet by mouth.      lisinopriL-hydrochlorothiazide (PRINZIDE,ZESTORETIC) 10-12.5 mg per tablet Take 1 tablet by mouth once daily. 30 tablet 11    pantoprazole (PROTONIX) 40 MG  tablet Take 1 tablet (40 mg total) by mouth once daily. 30 tablet 11    VYVANSE 50 mg capsule Take 50 mg by mouth every morning.       No current facility-administered medications on file prior to visit.     Health Maintenance   Topic Date Due    Lipid Panel  03/08/2028    TETANUS VACCINE  02/02/2033    Hepatitis C Screening  Completed      Results for orders placed or performed during the hospital encounter of 05/18/24   Blood culture #1 **CANNOT BE ORDERED STAT**    Specimen: Arm, Right; Blood   Result Value Ref Range    Blood Culture No Growth at 5 days    Blood culture #2 **CANNOT BE ORDERED STAT**    Specimen: Antecubital, Right; Blood   Result Value Ref Range    Blood Culture No Growth at 5 days    Comprehensive metabolic panel   Result Value Ref Range    Sodium 140 136 - 145 mmol/L    Potassium 4.2 3.5 - 5.1 mmol/L    Chloride 106 98 - 107 mmol/L    CO2 26 22 - 29 mmol/L    Glucose 73 (L) 74 - 100 mg/dL    Blood Urea Nitrogen 11.5 8.9 - 20.6 mg/dL    Creatinine 0.89 0.73 - 1.18 mg/dL    Calcium 9.6 8.4 - 10.2 mg/dL    Protein Total 6.9 6.4 - 8.3 gm/dL    Albumin 3.8 3.5 - 5.0 g/dL    Globulin 3.1 2.4 - 3.5 gm/dL    Albumin/Globulin Ratio 1.2 1.1 - 2.0 ratio    Bilirubin Total 0.3 <=1.5 mg/dL    ALP 83 40 - 150 unit/L    ALT 60 (H) 0 - 55 unit/L    AST 89 (H) 5 - 34 unit/L    eGFR >60 mL/min/1.73/m2    Anion Gap 8.0 mEq/L    BUN/Creatinine Ratio 13    Lactic acid, plasma   Result Value Ref Range    Lactic Acid Level 1.7 0.5 - 2.2 mmol/L   Sedimentation Rate   Result Value Ref Range    Sed Rate 10 0 - 15 mm/hr   CRP, High Sensitivity   Result Value Ref Range    C-Reactive Protein High Sensitivity 1.93 <=5.00 mg/L   CBC with Differential   Result Value Ref Range    WBC 10.35 4.50 - 11.50 x10(3)/mcL    RBC 4.90 4.70 - 6.10 x10(6)/mcL    Hgb 14.0 14.0 - 18.0 g/dL    Hct 42.9 42.0 - 52.0 %    MCV 87.6 80.0 - 94.0 fL    MCH 28.6 27.0 - 31.0 pg    MCHC 32.6 (L) 33.0 - 36.0 g/dL    RDW 13.0 11.5 - 17.0 %     Platelet 454 (H) 130 - 400 x10(3)/mcL    MPV 10.0 7.4 - 10.4 fL    Neut % 61.0 %    Lymph % 20.1 %    Mono % 15.5 %    Eos % 2.3 %    Basophil % 0.7 %    Lymph # 2.08 0.6 - 4.6 x10(3)/mcL    Neut # 6.32 2.1 - 9.2 x10(3)/mcL    Mono # 1.60 (H) 0.1 - 1.3 x10(3)/mcL    Eos # 0.24 0 - 0.9 x10(3)/mcL    Baso # 0.07 <=0.2 x10(3)/mcL    IG# 0.04 0 - 0.04 x10(3)/mcL    IG% 0.4 %    NRBC% 0.0 %   Hemoglobin A1C   Result Value Ref Range    Hemoglobin A1c 4.9 <=7.0 %    Estimated Average Glucose 93.9 mg/dL   Comprehensive metabolic panel   Result Value Ref Range    Sodium 140 136 - 145 mmol/L    Potassium 3.9 3.5 - 5.1 mmol/L    Chloride 108 (H) 98 - 107 mmol/L    CO2 26 22 - 29 mmol/L    Glucose 114 (H) 74 - 100 mg/dL    Blood Urea Nitrogen 11.8 8.9 - 20.6 mg/dL    Creatinine 0.84 0.73 - 1.18 mg/dL    Calcium 9.4 8.4 - 10.2 mg/dL    Protein Total 6.3 (L) 6.4 - 8.3 gm/dL    Albumin 3.4 (L) 3.5 - 5.0 g/dL    Globulin 2.9 2.4 - 3.5 gm/dL    Albumin/Globulin Ratio 1.2 1.1 - 2.0 ratio    Bilirubin Total 0.1 <=1.5 mg/dL    ALP 77 40 - 150 unit/L    ALT 57 (H) 0 - 55 unit/L    AST 79 (H) 5 - 34 unit/L    eGFR >60 mL/min/1.73/m2    Anion Gap 6.0 mEq/L    BUN/Creatinine Ratio 14    CBC with Differential   Result Value Ref Range    WBC 8.44 4.50 - 11.50 x10(3)/mcL    RBC 4.52 (L) 4.70 - 6.10 x10(6)/mcL    Hgb 13.1 (L) 14.0 - 18.0 g/dL    Hct 40.0 (L) 42.0 - 52.0 %    MCV 88.5 80.0 - 94.0 fL    MCH 29.0 27.0 - 31.0 pg    MCHC 32.8 (L) 33.0 - 36.0 g/dL    RDW 13.2 11.5 - 17.0 %    Platelet 411 (H) 130 - 400 x10(3)/mcL    MPV 10.0 7.4 - 10.4 fL    Neut % 60.7 %    Lymph % 19.5 %    Mono % 13.2 %    Eos % 5.1 %    Basophil % 1.1 %    Lymph # 1.65 0.6 - 4.6 x10(3)/mcL    Neut # 5.13 2.1 - 9.2 x10(3)/mcL    Mono # 1.11 0.1 - 1.3 x10(3)/mcL    Eos # 0.43 0 - 0.9 x10(3)/mcL    Baso # 0.09 <=0.2 x10(3)/mcL    IG# 0.03 0 - 0.04 x10(3)/mcL    IG% 0.4 %    NRBC% 0.0 %          Assessment & Plan:     Active Problem List with Overview Notes     Diagnosis Date Noted    Encounter to obtain excuse from work 05/30/2024    Infection of superficial incisional surgical site after procedure 05/18/2024    Bilateral carpal tunnel syndrome 05/07/2024    Hypertension 05/02/2024    S/P splenectomy 05/02/2024    Carpal tunnel syndrome     Hepatitis C 07/10/2023    Nocturia 07/10/2023    Hernia of abdominal wall 07/10/2023    Screen for STD (sexually transmitted disease) 07/10/2023    Cigarette smoker 03/07/2023    Family history of diabetes mellitus 03/07/2023    Onychomycosis 03/07/2023    Anxiety     ADHD (attention deficit hyperactivity disorder)     Opioid abuse 01/26/2023       1. S/P splenectomy  Assessment & Plan:  Will request records from hospital stay 3/2024. Believes he got pneumonia vaccine while in hospital in [place]. Will verify records.  Healed well. Abd ct 3/30/24 reviewed.  Keep appts with GI.     States going to gym and is ready to return to work.  No paperwork from employer to fill out.       2. Encounter to obtain excuse from work  Assessment & Plan:  S/p splenectomy A&P      3. Carpal tunnel syndrome, unspecified laterality  Assessment & Plan:  S/p surgery with dr. Palacio 5/2024.  States has clearance from their office to return to work.            Follow up for as scheduled or sooner prn.

## 2024-05-30 NOTE — TELEPHONE ENCOUNTER
[employer] came to window requesting work release post appendectomy. We scheduled same day appt to discuss release with provider

## 2024-05-31 NOTE — ASSESSMENT & PLAN NOTE
Will request records from hospital stay 3/2024. Believes he got pneumonia vaccine while in hospital in [place]. Will verify records.  Healed well. Abd ct 3/30/24 reviewed.  Keep appts with GI.     States going to gym and is ready to return to work.  No paperwork from employer to fill out.

## 2024-06-10 ENCOUNTER — TELEPHONE (OUTPATIENT)
Dept: ORTHOPEDICS | Facility: CLINIC | Age: 34
End: 2024-06-10
Payer: COMMERCIAL

## 2024-06-10 NOTE — TELEPHONE ENCOUNTER
Called patient today to check on him and see how his incision is healing. He did not answer so, I left him a voicemail informing him to give me a call back when he gets a chance.

## 2024-06-21 ENCOUNTER — TELEPHONE (OUTPATIENT)
Dept: FAMILY MEDICINE | Facility: CLINIC | Age: 34
End: 2024-06-21
Payer: COMMERCIAL

## 2024-06-21 NOTE — TELEPHONE ENCOUNTER
I do not believe we have received his notes from his [place] hospital stay for splenectomy    Recommendations are   *Pcv23 if he has not had yet   *Hib- if he did not receive this as a child or unknown.  *Menactra - 2 doses at least 8 weeks apart and then booster every 5 years  *Bexsero 2 doses at least 1 month apart and then booster a booster x 1 at year and then every 2-3 years   *annual flu shot    We do not have hib in office.  So he may need to call the health unit for the meningitis shots as well as hib if needed.

## 2024-08-10 ENCOUNTER — PATIENT MESSAGE (OUTPATIENT)
Dept: FAMILY MEDICINE | Facility: CLINIC | Age: 34
End: 2024-08-10
Payer: COMMERCIAL

## 2024-08-10 DIAGNOSIS — Q89.01 ASPLENIA: Primary | ICD-10-CM

## 2024-08-13 RX ORDER — AMOXICILLIN 500 MG/1
500 TABLET, FILM COATED ORAL EVERY 12 HOURS
Qty: 180 TABLET | Refills: 3 | Status: SHIPPED | OUTPATIENT
Start: 2024-08-13 | End: 2025-08-13

## 2024-08-13 RX ORDER — AMOXICILLIN AND CLAVULANATE POTASSIUM 875; 125 MG/1; MG/1
1 TABLET, FILM COATED ORAL EVERY 12 HOURS
Qty: 14 TABLET | Refills: 0 | Status: SHIPPED | OUTPATIENT
Start: 2024-08-13 | End: 2024-08-20

## 2024-08-13 NOTE — TELEPHONE ENCOUNTER
Yes. Up-to date recommends daily antibiotic prophylaxis for adults at least one year following splenectomy.  We can reassess after that to determine risk vs benefit of daily prophylaxis (potential for hypersensitivity, alteration of microbiome, drug resistance of pathogens, etc).  I will send in amoxicillin 500mg bid.   I will also send in rx for augmentin for him to begin if he has fever or other concern for systemic infection.  If he has these symptoms, I recommend him to report to ER asap.     I also sent in a previous message vaccine recommendations.          I have signed for the following orders AND/OR meds.  Please call the patient and ask the patient to schedule the testing AND/OR inform about any medications that were sent.        Medications Ordered This Encounter   Medications    amoxicillin (AMOXIL) 500 MG Tab     Sig: Take 1 tablet (500 mg total) by mouth every 12 (twelve) hours. Dx Q89.01     Dispense:  180 tablet     Refill:  3    amoxicillin-clavulanate 875-125mg (AUGMENTIN) 875-125 mg per tablet     Sig: Take 1 tablet by mouth every 12 (twelve) hours. for 7 days     Dispense:  14 tablet     Refill:  0

## 2024-08-20 DIAGNOSIS — I10 HYPERTENSION, UNSPECIFIED TYPE: ICD-10-CM

## 2024-08-20 RX ORDER — LISINOPRIL AND HYDROCHLOROTHIAZIDE 10; 12.5 MG/1; MG/1
1 TABLET ORAL
Qty: 30 TABLET | Refills: 0 | Status: SHIPPED | OUTPATIENT
Start: 2024-08-20

## 2024-09-16 ENCOUNTER — HOSPITAL ENCOUNTER (OUTPATIENT)
Dept: RADIOLOGY | Facility: HOSPITAL | Age: 34
Discharge: HOME | End: 2024-09-16
Attending: FAMILY MEDICINE
Payer: COMMERCIAL

## 2024-09-16 ENCOUNTER — OFFICE VISIT (OUTPATIENT)
Dept: FAMILY MEDICINE | Facility: CLINIC | Age: 34
End: 2024-09-16
Payer: COMMERCIAL

## 2024-09-16 VITALS
BODY MASS INDEX: 27.94 KG/M2 | DIASTOLIC BLOOD PRESSURE: 84 MMHG | TEMPERATURE: 99 F | RESPIRATION RATE: 16 BRPM | WEIGHT: 195.19 LBS | HEIGHT: 70 IN | OXYGEN SATURATION: 98 % | SYSTOLIC BLOOD PRESSURE: 126 MMHG | HEART RATE: 92 BPM

## 2024-09-16 DIAGNOSIS — R06.02 SHORTNESS OF BREATH: ICD-10-CM

## 2024-09-16 DIAGNOSIS — Z23 NEEDS FLU SHOT: ICD-10-CM

## 2024-09-16 DIAGNOSIS — Z90.81 S/P SPLENECTOMY: Primary | ICD-10-CM

## 2024-09-16 PROBLEM — Z02.89 ENCOUNTER TO OBTAIN EXCUSE FROM WORK: Status: RESOLVED | Noted: 2024-05-30 | Resolved: 2024-09-16

## 2024-09-16 PROBLEM — T81.41XA INFECTION OF SUPERFICIAL INCISIONAL SURGICAL SITE AFTER PROCEDURE: Status: RESOLVED | Noted: 2024-05-18 | Resolved: 2024-09-16

## 2024-09-16 PROCEDURE — 99214 OFFICE O/P EST MOD 30 MIN: CPT | Mod: 25,,, | Performed by: FAMILY MEDICINE

## 2024-09-16 PROCEDURE — 90656 IIV3 VACC NO PRSV 0.5 ML IM: CPT | Mod: ,,, | Performed by: FAMILY MEDICINE

## 2024-09-16 PROCEDURE — 1159F MED LIST DOCD IN RCRD: CPT | Mod: CPTII,,, | Performed by: FAMILY MEDICINE

## 2024-09-16 PROCEDURE — 3008F BODY MASS INDEX DOCD: CPT | Mod: CPTII,,, | Performed by: FAMILY MEDICINE

## 2024-09-16 PROCEDURE — 3074F SYST BP LT 130 MM HG: CPT | Mod: CPTII,,, | Performed by: FAMILY MEDICINE

## 2024-09-16 PROCEDURE — 4010F ACE/ARB THERAPY RXD/TAKEN: CPT | Mod: CPTII,,, | Performed by: FAMILY MEDICINE

## 2024-09-16 PROCEDURE — 3079F DIAST BP 80-89 MM HG: CPT | Mod: CPTII,,, | Performed by: FAMILY MEDICINE

## 2024-09-16 PROCEDURE — 71046 X-RAY EXAM CHEST 2 VIEWS: CPT | Mod: TC

## 2024-09-16 PROCEDURE — 3044F HG A1C LEVEL LT 7.0%: CPT | Mod: CPTII,,, | Performed by: FAMILY MEDICINE

## 2024-09-16 PROCEDURE — 90471 IMMUNIZATION ADMIN: CPT | Mod: ,,, | Performed by: FAMILY MEDICINE

## 2024-09-16 RX ORDER — TADALAFIL 20 MG/1
20 TABLET ORAL DAILY PRN
COMMUNITY
Start: 2024-06-11

## 2024-09-16 RX ORDER — ALBUTEROL SULFATE 90 UG/1
2 INHALANT RESPIRATORY (INHALATION) EVERY 6 HOURS PRN
Qty: 18 G | Refills: 5 | Status: SHIPPED | OUTPATIENT
Start: 2024-09-16 | End: 2025-09-16

## 2024-09-16 RX ORDER — AMOXICILLIN 500 MG/1
500 CAPSULE ORAL EVERY 12 HOURS
COMMUNITY
Start: 2024-08-13

## 2024-09-16 NOTE — ASSESSMENT & PLAN NOTE
S/p splenectomy 3/2024 in [place]. Believes he got pneumonia vaccine while in hospital in [place]. Records have been requested.  Healed well. Abd ct 3/30/24 reviewed.  Keep appts with GI.     Up-to-date recommends daily antibiotic prophylaxis for adults at least one year following splenectomy.  We then can reassess after that to determine risk vs benefit of daily prophylaxis (potential for hypersensitivity, alteration of microbiome, drug resistance of pathogens, etc).  Rx for amoxicillin 500mg twice daily was sent in 8/2024. Prescription for augmentin for you to begin if you develop fever, chills, or any other concern for systemic infection was also sent in at that time.  He was advised to report to the ER asap if he has any of these symptoms.  He has not had to use augmentin. Has been taking amoxicillin as prescribed.     Recommend annual flu shot and pneumonia vaccine q5 years. Flu shot today

## 2024-09-16 NOTE — ASSESSMENT & PLAN NOTE
Reports symptoms since splenectomy 3/2024. Will get cxr today. Will call with results when available. Will get pfts. Will call with results when available.  Will send in albuterol inhaler to use as directed.  Monitor symptoms.  Will also get allergy testing as requested. Will call with results when available. Monitor symptoms. Contact clinic for concerns. Patient is agreeable to plan and verbalized understanding

## 2024-09-16 NOTE — PROGRESS NOTES
Subjective:        Patient ID: Elsie Ahuja is a 33 y.o. male.    Chief Complaint: Follow-up (States he had a cold for a month. Picked up abx prescribed for him but said he was told by pharmacist that the dose was too much so he would like to review )      Patient presents to clinic unaccompanied for follow up.  He is due for his wellness visit in march     He is s/p splenectomy 2/2 rupture in [place] 3/16/2024. No trauma or injury. Not sure why it happened.  Got pneumonia shot while inpatient in [place].  We have requested records. Not received. Will request again.   Staples removed 4/2024. Patient had US (Monday 04/15/2023) and has follow up with GI (Dr. Balderas).  Had EGD on 04/30/2024. Was good. Is on ppi.   Denies fever.  is having regular bowel movements.  Does not do heavy lifting.  He denies any abdominal pain.  Has been going to gym.   Up-to-date recommends daily antibiotic prophylaxis for adults at least one year following splenectomy.  We then can reassess after that to determine risk vs benefit of daily prophylaxis (potential for hypersensitivity, alteration of microbiome, drug resistance of pathogens, etc).  Rx for amoxicillin 500mg twice daily was sent in 8/2024. Prescription for augmentin for you to begin if you develop fever, chills, or any other concern for systemic infection was also sent in at that time.  He was advised to report to the ER asap if he has any of these symptoms.  He was concerned because his pharmacist had questions about dose.    C/o shortness of breath still since surgery. Sometimes feeling like may pass out.  Asking for allergy testing. Using incentive spirometer.  No history of asthma as child. Having some wheezing. Not using inhalers. Symptoms usually worse at night.            Had left carpal tunnel/ulnar nerve release surgery with dr. Palacio 5/7/24. Had infection after a dog bite and was admitted 5/18/24-5/20/24. Saw him 5/24/24 for follow up.   Doing well.  Not planning on  "doing right hand at this time.     Hep c screening was positive in 3/2023.  Doing treatment with dr. Balderas.      He has htn. Reports compliance with meds.       has gerd.  On ppi.       He has anxiety and adhd.  Is on vyvanse and buspar.  Follows with psych, dr. Christy mejia.  Sees her q3 months.       He follows with dr. Momin for hormones.  Is no longer seeing him so not taking meds.   Will establish with urology     He is not allergic to any medications. He smokes cigarettes- has not smoked in a while.        Has family history of diabetes in both parents. No family history of colon cancer.  He is an  in [employer].       Shortness of Breath  This is a new problem. The current episode started yesterday. The problem occurs intermittently. The problem has been waxing and waning. The average episode lasts 30 minutes. Associated symptoms include abdominal pain, coryza, orthopnea, rhinorrhea and a sore throat. The symptoms are aggravated by weather changes and lying flat. The patient has no known risk factors for DVT/PE. He has tried body position changes and rest for the symptoms. The treatment provided no relief. His past medical history is significant for allergies. There is no history of aspirin allergies, asthma, bronchiolitis, CAD, chronic lung disease, COPD, DVT, a heart failure, PE, pneumonia or a recent surgery.     Review of Systems   HENT:  Positive for rhinorrhea and sore throat.    Respiratory:  Positive for shortness of breath.    Cardiovascular:  Positive for orthopnea.   Gastrointestinal:  Positive for abdominal pain.         Review of patient's allergies indicates:   Allergen Reactions    Sulfa (sulfonamide antibiotics) Nausea And Vomiting and Nausea Only      Vitals:    09/16/24 0955   BP: 126/84   BP Location: Left arm   Pulse: 92   Resp: 16   Temp: 98.7 °F (37.1 °C)   TempSrc: Temporal   SpO2: 98%   Weight: 88.5 kg (195 lb 3.2 oz)   Height: 5' 10" (1.778 m)      Social History "     Socioeconomic History    Marital status:    Tobacco Use    Smoking status: Former     Current packs/day: 0.25     Average packs/day: 0.3 packs/day for 5.0 years (1.3 ttl pk-yrs)     Types: Cigarettes    Smokeless tobacco: Never    Tobacco comments:     Smokes socially. Less than 1 pack in 6 months    Substance and Sexual Activity    Alcohol use: Not Currently    Drug use: Not Currently     Types: Marijuana    Sexual activity: Yes     Partners: Female     Birth control/protection: I.U.D.     Social Determinants of Health     Financial Resource Strain: Low Risk  (4/1/2024)    Overall Financial Resource Strain (CARDIA)     Difficulty of Paying Living Expenses: Not very hard   Food Insecurity: No Food Insecurity (4/1/2024)    Hunger Vital Sign     Worried About Running Out of Food in the Last Year: Never true     Ran Out of Food in the Last Year: Never true   Transportation Needs: No Transportation Needs (4/1/2024)    PRAPARE - Transportation     Lack of Transportation (Medical): No     Lack of Transportation (Non-Medical): No   Physical Activity: Sufficiently Active (4/1/2024)    Exercise Vital Sign     Days of Exercise per Week: 7 days     Minutes of Exercise per Session: 90 min   Stress: Stress Concern Present (4/1/2024)    [employer] [place] [employer] - Occupational Stress Questionnaire     Feeling of Stress : To some extent   Housing Stability: Low Risk  (4/1/2024)    Housing Stability Vital Sign     Unable to Pay for Housing in the Last Year: No     Number of Places Lived in the Last Year: 1     Unstable Housing in the Last Year: No      Family History   Problem Relation Name Age of Onset    Arthritis Mother Meghann Ahuja     Depression Mother Meghann Ahuja     Hyperlipidemia Mother Meghann Ahuja     Hypertension Mother Meghann Ahuja     Arthritis Father Amando Leigha     Diabetes Father Amando Ahuja     Hearing loss Father Amando Leigha     Hyperlipidemia Father Amando Ahuja     Hypertension  Father Amando Ahuja           Objective:     Physical Exam  Vitals and nursing note reviewed.   Constitutional:       Appearance: Normal appearance. He is normal weight.   HENT:      Head: Normocephalic.      Nose: Nose normal.      Mouth/Throat:      Mouth: Mucous membranes are moist.      Pharynx: Oropharynx is clear.   Eyes:      Extraocular Movements: Extraocular movements intact.   Cardiovascular:      Rate and Rhythm: Normal rate and regular rhythm.   Pulmonary:      Effort: Pulmonary effort is normal. No respiratory distress.      Breath sounds: Normal breath sounds. No stridor. No wheezing, rhonchi or rales.   Chest:      Chest wall: No tenderness.   Musculoskeletal:         General: Normal range of motion.   Skin:     General: Skin is warm and dry.   Neurological:      General: No focal deficit present.      Mental Status: He is alert and oriented to person, place, and time. Mental status is at baseline.   Psychiatric:         Mood and Affect: Mood normal.       Current Outpatient Medications on File Prior to Visit   Medication Sig Dispense Refill    amoxicillin (AMOXIL) 500 MG capsule Take 500 mg by mouth every 12 (twelve) hours.      busPIRone (BUSPAR) 5 MG Tab take one tablet by mouth two times daily for anxiety      EPCLUSA 400-100 mg Tab Take 1 tablet by mouth.      lisinopriL-hydrochlorothiazide (PRINZIDE,ZESTORETIC) 10-12.5 mg per tablet TAKE ONE TABLET BY MOUTH ONE TIME DAILY 30 tablet 0    tadalafiL (CIALIS) 20 MG Tab Take 20 mg by mouth daily as needed.      VYVANSE 50 mg capsule Take 50 mg by mouth every morning.      pantoprazole (PROTONIX) 40 MG tablet Take 1 tablet (40 mg total) by mouth once daily. 30 tablet 11    [DISCONTINUED] amoxicillin (AMOXIL) 500 MG Tab Take 1 tablet (500 mg total) by mouth every 12 (twelve) hours. Dx Q89.01 180 tablet 3     No current facility-administered medications on file prior to visit.     Health Maintenance   Topic Date Due    Lipid Panel  03/08/2028    TETANUS  VACCINE  02/02/2033    Hepatitis C Screening  Completed      Results for orders placed or performed during the hospital encounter of 05/18/24   Blood culture #1 **CANNOT BE ORDERED STAT**    Specimen: Arm, Right; Blood   Result Value Ref Range    Blood Culture No Growth at 5 days    Blood culture #2 **CANNOT BE ORDERED STAT**    Specimen: Antecubital, Right; Blood   Result Value Ref Range    Blood Culture No Growth at 5 days    Comprehensive metabolic panel   Result Value Ref Range    Sodium 140 136 - 145 mmol/L    Potassium 4.2 3.5 - 5.1 mmol/L    Chloride 106 98 - 107 mmol/L    CO2 26 22 - 29 mmol/L    Glucose 73 (L) 74 - 100 mg/dL    Blood Urea Nitrogen 11.5 8.9 - 20.6 mg/dL    Creatinine 0.89 0.73 - 1.18 mg/dL    Calcium 9.6 8.4 - 10.2 mg/dL    Protein Total 6.9 6.4 - 8.3 gm/dL    Albumin 3.8 3.5 - 5.0 g/dL    Globulin 3.1 2.4 - 3.5 gm/dL    Albumin/Globulin Ratio 1.2 1.1 - 2.0 ratio    Bilirubin Total 0.3 <=1.5 mg/dL    ALP 83 40 - 150 unit/L    ALT 60 (H) 0 - 55 unit/L    AST 89 (H) 5 - 34 unit/L    eGFR >60 mL/min/1.73/m2    Anion Gap 8.0 mEq/L    BUN/Creatinine Ratio 13    Lactic acid, plasma   Result Value Ref Range    Lactic Acid Level 1.7 0.5 - 2.2 mmol/L   Sedimentation Rate   Result Value Ref Range    Sed Rate 10 0 - 15 mm/hr   CRP, High Sensitivity   Result Value Ref Range    C-Reactive Protein High Sensitivity 1.93 <=5.00 mg/L   CBC with Differential   Result Value Ref Range    WBC 10.35 4.50 - 11.50 x10(3)/mcL    RBC 4.90 4.70 - 6.10 x10(6)/mcL    Hgb 14.0 14.0 - 18.0 g/dL    Hct 42.9 42.0 - 52.0 %    MCV 87.6 80.0 - 94.0 fL    MCH 28.6 27.0 - 31.0 pg    MCHC 32.6 (L) 33.0 - 36.0 g/dL    RDW 13.0 11.5 - 17.0 %    Platelet 454 (H) 130 - 400 x10(3)/mcL    MPV 10.0 7.4 - 10.4 fL    Neut % 61.0 %    Lymph % 20.1 %    Mono % 15.5 %    Eos % 2.3 %    Basophil % 0.7 %    Lymph # 2.08 0.6 - 4.6 x10(3)/mcL    Neut # 6.32 2.1 - 9.2 x10(3)/mcL    Mono # 1.60 (H) 0.1 - 1.3 x10(3)/mcL    Eos # 0.24 0 - 0.9  x10(3)/mcL    Baso # 0.07 <=0.2 x10(3)/mcL    IG# 0.04 0 - 0.04 x10(3)/mcL    IG% 0.4 %    NRBC% 0.0 %   Hemoglobin A1C   Result Value Ref Range    Hemoglobin A1c 4.9 <=7.0 %    Estimated Average Glucose 93.9 mg/dL   Comprehensive metabolic panel   Result Value Ref Range    Sodium 140 136 - 145 mmol/L    Potassium 3.9 3.5 - 5.1 mmol/L    Chloride 108 (H) 98 - 107 mmol/L    CO2 26 22 - 29 mmol/L    Glucose 114 (H) 74 - 100 mg/dL    Blood Urea Nitrogen 11.8 8.9 - 20.6 mg/dL    Creatinine 0.84 0.73 - 1.18 mg/dL    Calcium 9.4 8.4 - 10.2 mg/dL    Protein Total 6.3 (L) 6.4 - 8.3 gm/dL    Albumin 3.4 (L) 3.5 - 5.0 g/dL    Globulin 2.9 2.4 - 3.5 gm/dL    Albumin/Globulin Ratio 1.2 1.1 - 2.0 ratio    Bilirubin Total 0.1 <=1.5 mg/dL    ALP 77 40 - 150 unit/L    ALT 57 (H) 0 - 55 unit/L    AST 79 (H) 5 - 34 unit/L    eGFR >60 mL/min/1.73/m2    Anion Gap 6.0 mEq/L    BUN/Creatinine Ratio 14    CBC with Differential   Result Value Ref Range    WBC 8.44 4.50 - 11.50 x10(3)/mcL    RBC 4.52 (L) 4.70 - 6.10 x10(6)/mcL    Hgb 13.1 (L) 14.0 - 18.0 g/dL    Hct 40.0 (L) 42.0 - 52.0 %    MCV 88.5 80.0 - 94.0 fL    MCH 29.0 27.0 - 31.0 pg    MCHC 32.8 (L) 33.0 - 36.0 g/dL    RDW 13.2 11.5 - 17.0 %    Platelet 411 (H) 130 - 400 x10(3)/mcL    MPV 10.0 7.4 - 10.4 fL    Neut % 60.7 %    Lymph % 19.5 %    Mono % 13.2 %    Eos % 5.1 %    Basophil % 1.1 %    Lymph # 1.65 0.6 - 4.6 x10(3)/mcL    Neut # 5.13 2.1 - 9.2 x10(3)/mcL    Mono # 1.11 0.1 - 1.3 x10(3)/mcL    Eos # 0.43 0 - 0.9 x10(3)/mcL    Baso # 0.09 <=0.2 x10(3)/mcL    IG# 0.03 0 - 0.04 x10(3)/mcL    IG% 0.4 %    NRBC% 0.0 %          Assessment & Plan:     Active Problem List with Overview Notes    Diagnosis Date Noted    Shortness of breath 09/16/2024    Needs flu shot 09/16/2024    Bilateral carpal tunnel syndrome 05/07/2024    Hypertension 05/02/2024    S/P splenectomy 05/02/2024    Carpal tunnel syndrome     Hepatitis C 07/10/2023    Nocturia 07/10/2023    Hernia of abdominal wall  07/10/2023    Screen for STD (sexually transmitted disease) 07/10/2023    Cigarette smoker 03/07/2023    Family history of diabetes mellitus 03/07/2023    Onychomycosis 03/07/2023    Anxiety     ADHD (attention deficit hyperactivity disorder)     Opioid abuse 01/26/2023       1. S/P splenectomy  Assessment & Plan:  S/p splenectomy 3/2024 in [place]. Believes he got pneumonia vaccine while in hospital in [place]. Records have been requested.  Healed well. Abd ct 3/30/24 reviewed.  Keep appts with GI.     Up-to-date recommends daily antibiotic prophylaxis for adults at least one year following splenectomy.  We then can reassess after that to determine risk vs benefit of daily prophylaxis (potential for hypersensitivity, alteration of microbiome, drug resistance of pathogens, etc).  Rx for amoxicillin 500mg twice daily was sent in 8/2024. Prescription for augmentin for you to begin if you develop fever, chills, or any other concern for systemic infection was also sent in at that time.  He was advised to report to the ER asap if he has any of these symptoms.  He has not had to use augmentin. Has been taking amoxicillin as prescribed.     Recommend annual flu shot and pneumonia vaccine q5 years. Flu shot today      2. Shortness of breath  Assessment & Plan:  Reports symptoms since splenectomy 3/2024. Will get cxr today. Will call with results when available. Will get pfts. Will call with results when available.  Will send in albuterol inhaler to use as directed.  Monitor symptoms.  Will also get allergy testing as requested. Will call with results when available. Monitor symptoms. Contact clinic for concerns. Patient is agreeable to plan and verbalized understanding    Orders:  -     Complete PFT w/ bronchodilator; Future  -     albuterol (PROAIR HFA) 90 mcg/actuation inhaler; Inhale 2 puffs into the lungs every 6 (six) hours as needed for Wheezing or Shortness of Breath. Rescue  Dispense: 18 g; Refill: 5  -     Allergen  Respiratory Panel IgE - Region 6; Future; Expected date: 09/16/2024  -     X-Ray Chest PA And Lateral; Future; Expected date: 09/16/2024    3. Needs flu shot  Assessment & Plan:  Flu shot today    Orders:  -     influenza (Flulaval, Fluzone, Fluarix) 45 mcg/0.5 mL IM vaccine (> or = 6 mo) 0.5 mL         Follow up for as scheduled or sooner prn.

## 2024-09-19 DIAGNOSIS — I10 HYPERTENSION, UNSPECIFIED TYPE: ICD-10-CM

## 2024-09-19 RX ORDER — LISINOPRIL AND HYDROCHLOROTHIAZIDE 10; 12.5 MG/1; MG/1
1 TABLET ORAL
Qty: 30 TABLET | Refills: 0 | Status: SHIPPED | OUTPATIENT
Start: 2024-09-19

## 2024-10-20 DIAGNOSIS — I10 HYPERTENSION, UNSPECIFIED TYPE: ICD-10-CM

## 2024-10-21 RX ORDER — LISINOPRIL AND HYDROCHLOROTHIAZIDE 10; 12.5 MG/1; MG/1
1 TABLET ORAL
Qty: 30 TABLET | Refills: 5 | Status: SHIPPED | OUTPATIENT
Start: 2024-10-21

## 2024-10-31 ENCOUNTER — PROCEDURE VISIT (OUTPATIENT)
Dept: RESPIRATORY THERAPY | Facility: HOSPITAL | Age: 34
End: 2024-10-31
Attending: FAMILY MEDICINE
Payer: COMMERCIAL

## 2024-10-31 VITALS — OXYGEN SATURATION: 94 % | HEART RATE: 78 BPM | RESPIRATION RATE: 20 BRPM

## 2024-10-31 DIAGNOSIS — R06.02 SHORTNESS OF BREATH: ICD-10-CM

## 2024-10-31 LAB
DLCO SINGLE BREATH LLN: 26.96
DLCO SINGLE BREATH PRE REF: 98.6 %
DLCO SINGLE BREATH REF: 33.88
DLCOC SBVA LLN: 3.51
DLCOC SBVA REF: 4.75
DLCOC SINGLE BREATH LLN: 26.96
DLCOC SINGLE BREATH REF: 33.88
DLCOCSBVAULN: 6
DLCOCSINGLEBREATHULN: 40.81
DLCOSINGLEBREATHULN: 40.81
DLCOVA LLN: 3.51
DLCOVA PRE REF: 104.7 %
DLCOVA PRE: 4.98 ML/(MIN*MMHG*L) (ref 3.51–6)
DLCOVA REF: 4.75
DLCOVAULN: 6
ERV LLN: -16448.5
ERV PRE REF: 74.6 %
ERV REF: 1.5
ERVULN: ABNORMAL
FEF 25 75 LLN: 2.89
FEF 25 75 PRE REF: 28.7 %
FEF 25 75 REF: 4.6
FEF2575CHANGE: 10.3 %
FET100CHANGE: 11.3 %
FEV1 FVC LLN: 71
FEV1 FVC PRE REF: 69.2 %
FEV1 FVC REF: 82
FEV1 LLN: 3.44
FEV1 PRE REF: 60.3 %
FEV1 REF: 4.33
FEV1CHANGE: 11.5 %
FEV1FVCCHANGE: 2.6 %
FRCPLETH LLN: 2.41
FRCPLETH PREREF: 112.2 %
FRCPLETH REF: 3.39
FRCPLETHULN: 4.38
FVC LLN: 4.27
FVC PRE REF: 86.8 %
FVC REF: 5.33
FVCCHANGE: 8.8 %
IVC PRE: 5.05 L (ref 4.27–6.41)
IVC SINGLE BREATH LLN: 4.27
IVC SINGLE BREATH PRE REF: 94.7 %
IVC SINGLE BREATH REF: 5.33
IVCSINGLEBREATHULN: 6.41
LLN IC: -9999996.32
MVV LLN: 140
MVV PRE REF: 47.7 %
MVV REF: 164
PEF LLN: 7.92
PEF PRE REF: 61.6 %
PEF REF: 10.24
PEFCHANGE: 12.8 %
POST FEF 25 75: 1.46 L/S (ref 2.89–6.31)
POST FET 100: 7.73 SEC
POST FEV1 FVC: 57.85 % (ref 71.01–90.39)
POST FEV1: 2.91 L (ref 3.44–5.19)
POST FVC: 5.03 L (ref 4.27–6.41)
POST PEF: 7.11 L/S (ref 7.92–12.55)
PRE DLCO: 33.41 ML/(MIN*MMHG) (ref 26.96–40.81)
PRE ERV: 1.12 L (ref -16448.5–16451.5)
PRE FEF 25 75: 1.32 L/S (ref 2.89–6.31)
PRE FET 100: 6.94 SEC
PRE FEV1 FVC: 56.4 % (ref 71.01–90.39)
PRE FEV1: 2.61 L (ref 3.44–5.19)
PRE FRC PL: 3.81 L (ref 2.41–4.38)
PRE FVC: 4.63 L (ref 4.27–6.41)
PRE IC: 3.51 L (ref -9999996.32–#######.####)
PRE MVV: 78.39 L/MIN (ref 139.79–189.12)
PRE PEF: 6.3 L/S (ref 7.92–12.55)
PRE REF IC: 95.2 %
PRE RV: 2.69 L (ref 1.22–2.57)
PRE TLC: 7.32 L (ref 5.97–8.28)
RAW PRE REF: 178.9 %
RAW PRE: 5.47 CMH2O*S/L (ref 3.06–3.06)
RAW REF: 3.06
REF IC: 3.68
RV LLN: 1.22
RV PRE REF: 142.1 %
RV REF: 1.89
RVTLC LLN: 19
RVTLC PRE REF: 131.2 %
RVTLC PRE: 36.74 % (ref 19.02–36.98)
RVTLC REF: 28
RVTLCULN: 37
RVULN: 2.57
SGAW PRE REF: 51.8 %
SGAW PRE: 0.04 1/(CMH2O*S) (ref 0.08–0.08)
SGAW REF: 0.08
TLC LLN: 5.97
TLC PRE REF: 102.7 %
TLC REF: 7.13
TLC ULN: 8.28
ULN IC: ABNORMAL
VA PRE: 6.71 L (ref 6.98–6.98)
VA SINGLE BREATH PRE REF: 96.2 %
VA SINGLE BREATH REF: 6.98
VC LLN: 4.27
VC PRE REF: 86.8 %
VC PRE: 4.63 L (ref 4.27–6.41)
VC REF: 5.33
VC ULN: 6.41

## 2024-10-31 PROCEDURE — 94060 EVALUATION OF WHEEZING: CPT

## 2024-10-31 PROCEDURE — 94729 DIFFUSING CAPACITY: CPT

## 2024-10-31 PROCEDURE — 94727 GAS DIL/WSHOT DETER LNG VOL: CPT

## 2024-10-31 PROCEDURE — 94760 N-INVAS EAR/PLS OXIMETRY 1: CPT

## 2024-10-31 RX ORDER — ALBUTEROL SULFATE 0.83 MG/ML
2.5 SOLUTION RESPIRATORY (INHALATION)
Status: COMPLETED | OUTPATIENT
Start: 2024-10-31 | End: 2024-10-31

## 2024-10-31 RX ORDER — ALBUTEROL SULFATE 0.83 MG/ML
SOLUTION RESPIRATORY (INHALATION)
Status: DISPENSED
Start: 2024-10-31 | End: 2024-11-01

## 2024-10-31 RX ADMIN — ALBUTEROL SULFATE 2.5 MG: 0.83 SOLUTION RESPIRATORY (INHALATION) at 10:10

## 2024-11-05 ENCOUNTER — LAB VISIT (OUTPATIENT)
Dept: LAB | Facility: HOSPITAL | Age: 34
End: 2024-11-05
Attending: STUDENT IN AN ORGANIZED HEALTH CARE EDUCATION/TRAINING PROGRAM
Payer: COMMERCIAL

## 2024-11-05 DIAGNOSIS — R06.02 SHORTNESS OF BREATH: Primary | ICD-10-CM

## 2024-11-05 DIAGNOSIS — B18.2 CHRONIC HEPATITIS C WITH HEPATIC COMA: Primary | ICD-10-CM

## 2024-11-05 DIAGNOSIS — K21.9 GASTROESOPHAGEAL REFLUX DISEASE, UNSPECIFIED WHETHER ESOPHAGITIS PRESENT: ICD-10-CM

## 2024-11-05 DIAGNOSIS — R94.2 ABNORMAL PFT: Primary | ICD-10-CM

## 2024-11-05 DIAGNOSIS — Z90.81 S/P SPLENECTOMY: ICD-10-CM

## 2024-11-05 PROCEDURE — 87522 HEPATITIS C REVRS TRNSCRPJ: CPT | Mod: 59

## 2024-11-05 PROCEDURE — 87522 HEPATITIS C REVRS TRNSCRPJ: CPT

## 2024-11-05 PROCEDURE — 36415 COLL VENOUS BLD VENIPUNCTURE: CPT

## 2024-11-05 RX ORDER — AMOXICILLIN 500 MG/1
500 CAPSULE ORAL EVERY 12 HOURS
Qty: 60 CAPSULE | Refills: 3 | Status: SHIPPED | OUTPATIENT
Start: 2024-11-05 | End: 2025-03-05

## 2024-11-05 RX ORDER — ALBUTEROL SULFATE 90 UG/1
1-2 AEROSOL, METERED RESPIRATORY (INHALATION) EVERY 4 HOURS PRN
Qty: 18 G | Refills: 11 | Status: SHIPPED | OUTPATIENT
Start: 2024-11-05 | End: 2025-11-05

## 2024-11-06 LAB
HCV RNA SERPL NAA+PROBE-ACNC: NORMAL IU/ML
HCV RNA SERPL NAA+PROBE-LOG IU: NOT DETECTED {LOG_IU}/ML

## 2024-12-03 ENCOUNTER — TELEPHONE (OUTPATIENT)
Dept: FAMILY MEDICINE | Facility: CLINIC | Age: 34
End: 2024-12-03
Payer: COMMERCIAL

## 2024-12-03 NOTE — TELEPHONE ENCOUNTER
----- Message from Brandon sent at 12/2/2024 10:40 AM CST -----  Who Called: Elsie Ahuja    Caller is requesting assistance/information from provider's office.    Symptoms (please be specific):    How long has patient had these symptoms:    List of preferred pharmacies on file (remove unneeded): [unfilled]  If different, enter pharmacy into here including location and phone number:       Preferred Method of Contact: Phone Call  Patient's Preferred Phone Number on File: [phone]   Best Call Back Number, if different:  Additional Information: Pt is requesting a cb from nurse in regards to getting paperwork faxed for a clearance for work states it was done in June, but his job needs an updated form.

## 2024-12-05 ENCOUNTER — TELEPHONE (OUTPATIENT)
Dept: FAMILY MEDICINE | Facility: CLINIC | Age: 34
End: 2024-12-05
Payer: COMMERCIAL

## 2024-12-12 ENCOUNTER — PATIENT MESSAGE (OUTPATIENT)
Dept: FAMILY MEDICINE | Facility: CLINIC | Age: 34
End: 2024-12-12
Payer: COMMERCIAL

## 2024-12-12 NOTE — LETTER
[facility]  [address], [address]  [place] [address]  Phone: [phone] December 13, 2024     Patient: Elsie Ahuja   YOB: 1990   Date of Visit: 12/12/2024       To Whom It May Concern:    My patient, Elsie Ahuja, completed pulmonary function testing 10/31/2024. Per pulmonologist read of results- Moderate obstruction. Positive bronchodilator response. There is evidence of air trapping. Normal diffusion capacity. He was sent in an inhaler. Elsie is released to work without restrictions. I will attach pulmonary function test report.      If you have any questions or concerns, please don't hesitate to contact my office.    Sincerely,        Zayda Sosa MD

## 2024-12-13 NOTE — TELEPHONE ENCOUNTER
Ok to fax pft results as requested    Per pulmonologist read of pft- Moderate obstruction. Positive bronchodilator response. There is evidence of air trapping. Normal diffusion capacity. He was sent in an inhaler

## 2024-12-19 DIAGNOSIS — Z90.81 S/P SPLENECTOMY: ICD-10-CM

## 2024-12-19 RX ORDER — AMOXICILLIN 500 MG/1
500 CAPSULE ORAL EVERY 12 HOURS
Qty: 180 CAPSULE | Refills: 0 | Status: SHIPPED | OUTPATIENT
Start: 2024-12-19 | End: 2025-03-19

## 2024-12-23 ENCOUNTER — TELEPHONE (OUTPATIENT)
Dept: FAMILY MEDICINE | Facility: CLINIC | Age: 34
End: 2024-12-23
Payer: COMMERCIAL

## 2024-12-23 NOTE — TELEPHONE ENCOUNTER
Spoke to dr. Giraldo at his direct number [phone].  He is requesting patient have pcv 20 and men b vaccinations since we were not able to get his records from splenectomy in [place].  Please attempt to request these again when patient comes in for visit.  He is also asking to educate patient on his pft result and inhaler use. Please schedule patient a visit with me when I get back after new year for this.  Please also confirm we have pcv 20 and men b vaccines to give him at his visit as well.  Ok to overbook him if needed. thanks

## 2025-01-03 ENCOUNTER — OFFICE VISIT (OUTPATIENT)
Dept: FAMILY MEDICINE | Facility: CLINIC | Age: 35
End: 2025-01-03
Payer: COMMERCIAL

## 2025-01-03 VITALS
BODY MASS INDEX: 28.4 KG/M2 | RESPIRATION RATE: 16 BRPM | TEMPERATURE: 98 F | OXYGEN SATURATION: 98 % | SYSTOLIC BLOOD PRESSURE: 128 MMHG | HEART RATE: 94 BPM | HEIGHT: 70 IN | DIASTOLIC BLOOD PRESSURE: 76 MMHG | WEIGHT: 198.38 LBS

## 2025-01-03 DIAGNOSIS — R94.2 ABNORMAL PFTS (PULMONARY FUNCTION TESTS): ICD-10-CM

## 2025-01-03 DIAGNOSIS — Z90.81 S/P SPLENECTOMY: Primary | ICD-10-CM

## 2025-01-03 DIAGNOSIS — B18.2 CHRONIC HEPATITIS C WITHOUT HEPATIC COMA: ICD-10-CM

## 2025-01-03 PROBLEM — Z23 NEEDS FLU SHOT: Status: RESOLVED | Noted: 2024-09-16 | Resolved: 2025-01-03

## 2025-01-03 PROBLEM — F11.10 OPIOID ABUSE: Status: RESOLVED | Noted: 2023-01-26 | Resolved: 2025-01-03

## 2025-01-03 PROCEDURE — 90471 IMMUNIZATION ADMIN: CPT | Mod: ,,, | Performed by: FAMILY MEDICINE

## 2025-01-03 PROCEDURE — 3008F BODY MASS INDEX DOCD: CPT | Mod: CPTII,,, | Performed by: FAMILY MEDICINE

## 2025-01-03 PROCEDURE — 90677 PCV20 VACCINE IM: CPT | Mod: ,,, | Performed by: FAMILY MEDICINE

## 2025-01-03 PROCEDURE — 1160F RVW MEDS BY RX/DR IN RCRD: CPT | Mod: CPTII,,, | Performed by: FAMILY MEDICINE

## 2025-01-03 PROCEDURE — 1159F MED LIST DOCD IN RCRD: CPT | Mod: CPTII,,, | Performed by: FAMILY MEDICINE

## 2025-01-03 PROCEDURE — 99214 OFFICE O/P EST MOD 30 MIN: CPT | Mod: 25,,, | Performed by: FAMILY MEDICINE

## 2025-01-03 PROCEDURE — 3074F SYST BP LT 130 MM HG: CPT | Mod: CPTII,,, | Performed by: FAMILY MEDICINE

## 2025-01-03 PROCEDURE — 3078F DIAST BP <80 MM HG: CPT | Mod: CPTII,,, | Performed by: FAMILY MEDICINE

## 2025-01-03 NOTE — PROGRESS NOTES
"Subjective:        Patient ID: Elsie Ahuja is a 34 y.o. male.    Chief Complaint: Follow-up (Employer requested release )      Patient presents to clinic unaccompanied for follow up.  He is due for his wellness visit in march    He is applying for a new job and his employer is requesting updated vaccinations of pcv 20 and men b.  We do not have men b and will need to refer him to outside clinic for this.       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 No trauma or injury. Not sure why it happened.  Got pneumonia shot while inpatient in [place].  We have requested records. Not received. Will request again.   Staples removed 4/2024. Patient had US (Monday 04/15/2023) and has follow up with GI (Dr. Balderas).  Had EGD on 04/30/2024. Was good. Is on ppi.   Denies fever.  is having regular bowel movements.  Does not do heavy lifting.  He denies any abdominal pain.  Has been going to gym.   Up-to-date recommends daily antibiotic prophylaxis for adults at least one year following splenectomy.  We then can reassess after that to determine risk vs benefit of daily prophylaxis (potential for hypersensitivity, alteration of microbiome, drug resistance of pathogens, etc).  Rx for amoxicillin 500mg twice daily was sent in 8/2024. Prescription for augmentin for you to begin if you develop fever, chills, or any other concern for systemic infection was also sent in at that time.  He was advised to report to the ER asap if he has any of these symptoms.  He was concerned because his pharmacist had questions about dose.     C/o shortness of breath since surgery. Sometimes feeling like may pass out.  Asking for allergy testing. Using incentive spirometer.  No history of asthma as child. Having some wheezing. Not using inhalers. Symptoms usually worse at night.  We completed PFTS on 10/2024 which showed  "Moderate obstruction. Positive bronchodilator response. There is evidence of air trapping. Normal diffusion " "capacity."  He has an inhaler to use prn. Uses a few times a day.  He was referred to pulm.  Has not heard from anyone.  Does not feel he needs to be seen at this time.               Had left carpal tunnel/ulnar nerve release surgery with dr. Palacio 5/7/24. Had infection after a dog bite and was admitted 5/18/24-5/20/24. Saw him 5/24/24 for follow up.   Doing well.  Not planning on doing right hand at this time.      Hep c screening was positive in 3/2023.  Doing treatment with dr. Balderas.      He has htn. Reports compliance with meds.       has gerd.  On ppi.       He has anxiety and adhd.  Is on vyvanse and buspar.  Follows with psych, dr. Christy mejia.  Sees her q3 months.       He follows with dr. Momin for hormones.  Is no longer seeing him so not taking meds.   Will establish with urology     He is not allergic to any medications. He smokes cigarettes- has not smoked in a while.        Has family history of diabetes in both parents. No family history of colon cancer.  He is an  in [employer].            Review of Systems   Constitutional: Negative.    HENT: Negative.     Eyes: Negative.    Respiratory: Negative.     Cardiovascular: Negative.    Gastrointestinal: Negative.    Endocrine: Negative.    Genitourinary: Negative.    Musculoskeletal: Negative.    Skin: Negative.    Allergic/Immunologic: Negative.    Neurological: Negative.    Hematological: Negative.    Psychiatric/Behavioral: Negative.     All other systems reviewed and are negative.        Review of patient's allergies indicates:   Allergen Reactions    Sulfa (sulfonamide antibiotics) Nausea And Vomiting and Nausea Only      Vitals:    01/03/25 0914   BP: 128/76   BP Location: Left arm   Patient Position: Sitting   Pulse: 94   Resp: 16   Temp: 97.6 °F (36.4 °C)   TempSrc: Temporal   SpO2: 98%   Weight: 90 kg (198 lb 6.4 oz)   Height: 5' 10" (1.778 m)      Social History     Socioeconomic History    Marital status:    Tobacco Use "    Smoking status: Former     Current packs/day: 0.25     Average packs/day: 0.3 packs/day for 5.0 years (1.3 ttl pk-yrs)     Types: Cigarettes    Smokeless tobacco: Never    Tobacco comments:     Smokes socially. Less than 1 pack in 6 months    Substance and Sexual Activity    Alcohol use: Not Currently    Drug use: Not Currently     Types: Marijuana    Sexual activity: Yes     Partners: Female     Birth control/protection: I.U.D.     Social Drivers of Health     Financial Resource Strain: Low Risk  (4/1/2024)    Overall Financial Resource Strain (CARDIA)     Difficulty of Paying Living Expenses: Not very hard   Food Insecurity: No Food Insecurity (4/1/2024)    Hunger Vital Sign     Worried About Running Out of Food in the Last Year: Never true     Ran Out of Food in the Last Year: Never true   Transportation Needs: No Transportation Needs (4/1/2024)    PRAPARE - Transportation     Lack of Transportation (Medical): No     Lack of Transportation (Non-Medical): No   Physical Activity: Sufficiently Active (4/1/2024)    Exercise Vital Sign     Days of Exercise per Week: 7 days     Minutes of Exercise per Session: 90 min   Stress: Stress Concern Present (4/1/2024)    [employer] [place] [employer] - Occupational Stress Questionnaire     Feeling of Stress : To some extent   Housing Stability: Low Risk  (4/1/2024)    Housing Stability Vital Sign     Unable to Pay for Housing in the Last Year: No     Number of Places Lived in the Last Year: 1     Unstable Housing in the Last Year: No      Family History   Problem Relation Name Age of Onset    Arthritis Mother Meghann Ahuja     Depression Mother Meghann Ahuja     Hyperlipidemia Mother Meghann Ahuja     Hypertension Mother Meghann Ahuja     Arthritis Father Amando Ahuja     Diabetes Father Amando Ahuja     Hearing loss Father Amando Ahuja     Hyperlipidemia Father Amando Ahuja     Hypertension Father Amando Ahuja           Objective:     Physical Exam  Vitals and  nursing note reviewed.   Constitutional:       Appearance: Normal appearance. He is normal weight.   HENT:      Head: Normocephalic.      Nose: Nose normal.      Mouth/Throat:      Mouth: Mucous membranes are moist.      Pharynx: Oropharynx is clear.   Eyes:      Extraocular Movements: Extraocular movements intact.   Cardiovascular:      Rate and Rhythm: Normal rate and regular rhythm.   Pulmonary:      Effort: Pulmonary effort is normal.      Breath sounds: Normal breath sounds.   Musculoskeletal:         General: Normal range of motion.   Skin:     General: Skin is warm and dry.   Neurological:      General: No focal deficit present.      Mental Status: He is alert and oriented to person, place, and time. Mental status is at baseline.   Psychiatric:         Mood and Affect: Mood normal.       Current Outpatient Medications on File Prior to Visit   Medication Sig Dispense Refill    albuterol (VENTOLIN HFA) 90 mcg/actuation inhaler Inhale 1-2 puffs into the lungs every 4 (four) hours as needed for Wheezing or Shortness of Breath. Rescue 18 g 11    amoxicillin (AMOXIL) 500 MG capsule Take 1 capsule (500 mg total) by mouth every 12 (twelve) hours. 180 capsule 0    lisinopriL-hydrochlorothiazide (PRINZIDE,ZESTORETIC) 10-12.5 mg per tablet TAKE ONE TABLET BY MOUTH ONE TIME DAILY 30 tablet 5    pantoprazole (PROTONIX) 40 MG tablet Take 1 tablet (40 mg total) by mouth once daily. 30 tablet 11    tadalafiL (CIALIS) 20 MG Tab Take 20 mg by mouth daily as needed.      VYVANSE 50 mg capsule Take 50 mg by mouth every morning.       No current facility-administered medications on file prior to visit.     Health Maintenance   Topic Date Due    COVID-19 Vaccine (5 - 2024-25 season) 09/01/2024    Hemoglobin A1c (Diabetic Prevention Screening)  05/18/2027    Lipid Panel  03/08/2028    TETANUS VACCINE  02/02/2033    RSV Vaccine (Age 60+ and Pregnant patients) (1 - 1-dose 75+ series) 09/22/2063    Hepatitis C Screening  Completed     Influenza Vaccine  Completed    HIV Screening  Completed    Pneumococcal Vaccines (Age 0-49)  Completed      Results for orders placed or performed in visit on 11/05/24   Hepatitis C Virus Quantitative    Collection Time: 11/05/24  7:34 AM   Result Value Ref Range    HCV RNA Detect/Quant Undetected Undetected IU/mL   Hepatitis C RNA, Quantitative, PCR    Collection Time: 11/05/24  7:34 AM   Result Value Ref Range    Hepatitis C RNA, Qualitative Not Detected Not Detected          Assessment & Plan:     Active Problem List with Overview Notes    Diagnosis Date Noted    Abnormal PFTs (pulmonary function tests) 01/03/2025    Shortness of breath 09/16/2024    Bilateral carpal tunnel syndrome 05/07/2024    Hypertension 05/02/2024    S/P splenectomy 05/02/2024    Carpal tunnel syndrome     Chronic hepatitis C without hepatic coma 07/10/2023    Nocturia 07/10/2023    Hernia of abdominal wall 07/10/2023    Screen for STD (sexually transmitted disease) 07/10/2023    Cigarette smoker 03/07/2023    Family history of diabetes mellitus 03/07/2023    Onychomycosis 03/07/2023    Anxiety     ADHD (attention deficit hyperactivity disorder)        1. S/P splenectomy  Assessment & Plan:  S/p splenectomy 3/2024 in [place]. Believes he got pneumonia vaccine while in hospital in [place]. Records have been requested.  We have not received. Will update pcv 20 here in clinic today.  We do not have men b vaccine in office.  Advised to go to health unit or other clinic to obtain asap.  Healed well. Abd ct 3/30/24 reviewed.  Keep appts with GI.     Up-to-date recommends daily antibiotic prophylaxis for adults at least one year following splenectomy.  We then can reassess after that to determine risk vs benefit of daily prophylaxis (potential for hypersensitivity, alteration of microbiome, drug resistance of pathogens, etc).  Rx for amoxicillin 500mg twice daily was sent in 8/2024. Prescription for augmentin for you to begin if you develop  "fever, chills, or any other concern for systemic infection was also sent in at that time.  He was advised to report to the ER asap if he has any of these symptoms.  He has not had to use augmentin. Has been taking amoxicillin as prescribed.     Recommend annual flu shot and pneumonia vaccine q5 years.     Orders:  -     pneumoc 20-rosio conj-dip cr(PF) (PREVNAR-20 (PF)) injection Syrg 0.5 mL    2. Abnormal PFTs (pulmonary function tests)  Assessment & Plan:  PFT 10/2024 showed "Moderate obstruction. Positive bronchodilator response. There is evidence of air trapping. Normal diffusion capacity. "  He has rescue inhaler which he uses prn.  Education given to patient. He is a former smoker.       3. Chronic hepatitis C without hepatic coma  Assessment & Plan:  Treatment (epclusa) completed with Dr. Balderas ().            Follow up for as scheduled or sooner prn.   "

## 2025-01-03 NOTE — ASSESSMENT & PLAN NOTE
"PFT 10/2024 showed "Moderate obstruction. Positive bronchodilator response. There is evidence of air trapping. Normal diffusion capacity. "  He has rescue inhaler which he uses prn.  Education given to patient. He is a former smoker.   "

## 2025-01-03 NOTE — ASSESSMENT & PLAN NOTE
S/p splenectomy 3/2024 in [place]. Believes he got pneumonia vaccine while in hospital in [place]. Records have been requested.  We have not received. Will update pcv 20 here in clinic today.  We do not have men b vaccine in office.  Advised to go to health unit or other clinic to obtain asap.  Healed well. Abd ct 3/30/24 reviewed.  Keep appts with GI.     Up-to-date recommends daily antibiotic prophylaxis for adults at least one year following splenectomy.  We then can reassess after that to determine risk vs benefit of daily prophylaxis (potential for hypersensitivity, alteration of microbiome, drug resistance of pathogens, etc).  Rx for amoxicillin 500mg twice daily was sent in 8/2024. Prescription for augmentin for you to begin if you develop fever, chills, or any other concern for systemic infection was also sent in at that time.  He was advised to report to the ER asap if he has any of these symptoms.  He has not had to use augmentin. Has been taking amoxicillin as prescribed.     Recommend annual flu shot and pneumonia vaccine q5 years.

## 2025-01-07 ENCOUNTER — PATIENT MESSAGE (OUTPATIENT)
Dept: FAMILY MEDICINE | Facility: CLINIC | Age: 35
End: 2025-01-07
Payer: COMMERCIAL

## 2025-02-25 ENCOUNTER — RESULTS FOLLOW-UP (OUTPATIENT)
Dept: FAMILY MEDICINE | Facility: CLINIC | Age: 35
End: 2025-02-25
Payer: COMMERCIAL

## 2025-02-25 ENCOUNTER — PATIENT MESSAGE (OUTPATIENT)
Dept: FAMILY MEDICINE | Facility: CLINIC | Age: 35
End: 2025-02-25
Payer: COMMERCIAL

## 2025-02-25 ENCOUNTER — LAB VISIT (OUTPATIENT)
Dept: LAB | Facility: HOSPITAL | Age: 35
End: 2025-02-25
Attending: FAMILY MEDICINE
Payer: COMMERCIAL

## 2025-02-25 DIAGNOSIS — F90.9 ATTENTION DEFICIT HYPERACTIVITY DISORDER (ADHD), UNSPECIFIED ADHD TYPE: ICD-10-CM

## 2025-02-25 DIAGNOSIS — K21.9 GASTROESOPHAGEAL REFLUX DISEASE, UNSPECIFIED WHETHER ESOPHAGITIS PRESENT: ICD-10-CM

## 2025-02-25 DIAGNOSIS — Z90.81 S/P SPLENECTOMY: ICD-10-CM

## 2025-02-25 DIAGNOSIS — F17.210 CIGARETTE SMOKER: ICD-10-CM

## 2025-02-25 DIAGNOSIS — Z00.00 WELLNESS EXAMINATION: ICD-10-CM

## 2025-02-25 DIAGNOSIS — R03.0 ELEVATED BLOOD PRESSURE READING: ICD-10-CM

## 2025-02-25 DIAGNOSIS — I10 HYPERTENSION, UNSPECIFIED TYPE: ICD-10-CM

## 2025-02-25 DIAGNOSIS — F41.9 ANXIETY: ICD-10-CM

## 2025-02-25 LAB
ALBUMIN SERPL-MCNC: 4 G/DL (ref 3.5–5)
ALBUMIN/GLOB SERPL: 1.3 RATIO (ref 1.1–2)
ALP SERPL-CCNC: 72 UNIT/L (ref 40–150)
ALT SERPL-CCNC: 31 UNIT/L (ref 0–55)
ANION GAP SERPL CALC-SCNC: 8 MEQ/L
AST SERPL-CCNC: 28 UNIT/L (ref 5–34)
BASOPHILS # BLD AUTO: 0.09 X10(3)/MCL
BASOPHILS NFR BLD AUTO: 1.4 %
BILIRUB SERPL-MCNC: 0.6 MG/DL
BILIRUB UR QL STRIP.AUTO: NEGATIVE
BUN SERPL-MCNC: 19.5 MG/DL (ref 8.9–20.6)
CALCIUM SERPL-MCNC: 9.2 MG/DL (ref 8.4–10.2)
CHLORIDE SERPL-SCNC: 105 MMOL/L (ref 98–107)
CHOLEST SERPL-MCNC: 162 MG/DL
CHOLEST/HDLC SERPL: 3 {RATIO} (ref 0–5)
CLARITY UR: CLEAR
CO2 SERPL-SCNC: 29 MMOL/L (ref 22–29)
COLOR UR AUTO: NORMAL
CREAT SERPL-MCNC: 1.17 MG/DL (ref 0.72–1.25)
CREAT/UREA NIT SERPL: 17
EOSINOPHIL # BLD AUTO: 0.37 X10(3)/MCL (ref 0–0.9)
EOSINOPHIL NFR BLD AUTO: 5.6 %
ERYTHROCYTE [DISTWIDTH] IN BLOOD BY AUTOMATED COUNT: 14.5 % (ref 11.5–17)
GFR SERPLBLD CREATININE-BSD FMLA CKD-EPI: >60 ML/MIN/1.73/M2
GLOBULIN SER-MCNC: 3 GM/DL (ref 2.4–3.5)
GLUCOSE SERPL-MCNC: 102 MG/DL (ref 74–100)
GLUCOSE UR QL STRIP: NEGATIVE
HCT VFR BLD AUTO: 47 % (ref 42–52)
HDLC SERPL-MCNC: 64 MG/DL (ref 35–60)
HGB BLD-MCNC: 15.6 G/DL (ref 14–18)
HGB UR QL STRIP: NEGATIVE
IMM GRANULOCYTES # BLD AUTO: 0.03 X10(3)/MCL (ref 0–0.04)
IMM GRANULOCYTES NFR BLD AUTO: 0.5 %
KETONES UR QL STRIP: NEGATIVE
LDLC SERPL CALC-MCNC: 87 MG/DL (ref 50–140)
LEUKOCYTE ESTERASE UR QL STRIP: NEGATIVE
LYMPHOCYTES # BLD AUTO: 2.42 X10(3)/MCL (ref 0.6–4.6)
LYMPHOCYTES NFR BLD AUTO: 36.4 %
MCH RBC QN AUTO: 28.8 PG (ref 27–31)
MCHC RBC AUTO-ENTMCNC: 33.2 G/DL (ref 33–36)
MCV RBC AUTO: 86.9 FL (ref 80–94)
MONOCYTES # BLD AUTO: 0.95 X10(3)/MCL (ref 0.1–1.3)
MONOCYTES NFR BLD AUTO: 14.3 %
NEUTROPHILS # BLD AUTO: 2.79 X10(3)/MCL (ref 2.1–9.2)
NEUTROPHILS NFR BLD AUTO: 41.8 %
NITRITE UR QL STRIP: NEGATIVE
NRBC BLD AUTO-RTO: 0 %
PH UR STRIP: 7 [PH]
PLATELET # BLD AUTO: 359 X10(3)/MCL (ref 130–400)
PMV BLD AUTO: 10.2 FL (ref 7.4–10.4)
POTASSIUM SERPL-SCNC: 4.2 MMOL/L (ref 3.5–5.1)
PROT SERPL-MCNC: 7 GM/DL (ref 6.4–8.3)
PROT UR QL STRIP: NEGATIVE
RBC # BLD AUTO: 5.41 X10(6)/MCL (ref 4.7–6.1)
SODIUM SERPL-SCNC: 142 MMOL/L (ref 136–145)
SP GR UR STRIP.AUTO: 1.02 (ref 1–1.03)
TRIGL SERPL-MCNC: 57 MG/DL (ref 34–140)
TSH SERPL-ACNC: 0.88 UIU/ML (ref 0.35–4.94)
UROBILINOGEN UR STRIP-ACNC: 0.2
VLDLC SERPL CALC-MCNC: 11 MG/DL
WBC # BLD AUTO: 6.65 X10(3)/MCL (ref 4.5–11.5)

## 2025-02-25 PROCEDURE — 36415 COLL VENOUS BLD VENIPUNCTURE: CPT

## 2025-02-25 PROCEDURE — 81003 URINALYSIS AUTO W/O SCOPE: CPT

## 2025-02-25 PROCEDURE — 80053 COMPREHEN METABOLIC PANEL: CPT

## 2025-02-25 PROCEDURE — 80061 LIPID PANEL: CPT

## 2025-02-25 PROCEDURE — 85025 COMPLETE CBC W/AUTO DIFF WBC: CPT

## 2025-02-25 PROCEDURE — 84443 ASSAY THYROID STIM HORMONE: CPT

## 2025-02-25 RX ORDER — AMOXICILLIN 500 MG/1
500 CAPSULE ORAL EVERY 12 HOURS
Qty: 180 CAPSULE | Refills: 0 | Status: SHIPPED | OUTPATIENT
Start: 2025-02-25 | End: 2025-05-26

## 2025-02-25 NOTE — TELEPHONE ENCOUNTER
See below  Does pt need to repeat labs  Please advise      Hey I'm afraid we're going to have to retest Labs as that i wasn't fasted. I was under the impression that these Labs were a follow up for a treatment done with Dr. Balderas that a fast wasn't required. I'm scheduled to leave for 6 wks. I may be able to drop in first thing tomorrow morning or we'll have to scheduled for sometime after 6 wks from tomorrow. I apologize for the inconvenience.

## 2025-03-26 ENCOUNTER — PATIENT MESSAGE (OUTPATIENT)
Dept: FAMILY MEDICINE | Facility: CLINIC | Age: 35
End: 2025-03-26
Payer: COMMERCIAL

## 2025-03-26 DIAGNOSIS — Z11.3 SCREEN FOR STD (SEXUALLY TRANSMITTED DISEASE): Primary | ICD-10-CM

## 2025-03-27 NOTE — TELEPHONE ENCOUNTER
I have signed for the following orders AND/OR meds.  Please call the patient and ask the patient to schedule the testing AND/OR inform about any medications that were sent.      Orders Placed This Encounter   Procedures    Chlamydia/GC, PCR     Standing Status:   Future     Expected Date:   3/27/2025     Expiration Date:   6/25/2026     Release to patient:   Immediate    RPR (DX) with reflex to titer and confirmatory testing     Standing Status:   Future     Number of Occurrences:   1     Expected Date:   3/27/2025     Expiration Date:   6/27/2025     Release to patient:   Immediate    HIV 1/2 Ag/Ab (4th Gen)     Standing Status:   Future     Expected Date:   3/27/2025     Expiration Date:   6/27/2025     Release to patient:   Immediate    Hepatitis Panel, Acute     Standing Status:   Future     Expected Date:   3/27/2025     Expiration Date:   5/26/2026    Trichomonas Vaginalis, MEREDITH     Standing Status:   Future     Number of Occurrences:   1     Expected Date:   3/27/2025     Expiration Date:   5/26/2026     Release to patient:   Immediate     Send normal result to authorizing provider's In Basket if patient is active on MyChart::   Yes

## 2025-04-18 ENCOUNTER — PATIENT MESSAGE (OUTPATIENT)
Dept: FAMILY MEDICINE | Facility: CLINIC | Age: 35
End: 2025-04-18
Payer: COMMERCIAL

## 2025-04-18 DIAGNOSIS — F90.9 ATTENTION DEFICIT HYPERACTIVITY DISORDER (ADHD), UNSPECIFIED ADHD TYPE: ICD-10-CM

## 2025-04-18 DIAGNOSIS — Z00.00 WELLNESS EXAMINATION: Primary | ICD-10-CM

## 2025-04-18 DIAGNOSIS — I10 PRIMARY HYPERTENSION: ICD-10-CM

## 2025-04-21 NOTE — TELEPHONE ENCOUNTER
I added wellness labs. His std lab orders are still in chart and should be active.     I have signed for the following orders AND/OR meds.  Please call the patient and ask the patient to schedule the testing AND/OR inform about any medications that were sent.      Orders Placed This Encounter   Procedures    CBC Auto Differential     Standing Status:   Future     Expected Date:   4/21/2025     Expiration Date:   7/21/2025    Comprehensive Metabolic Panel     Standing Status:   Future     Expected Date:   4/21/2025     Expiration Date:   7/21/2025    Lipid Panel     Standing Status:   Future     Expected Date:   4/21/2025     Expiration Date:   7/21/2025    TSH     Standing Status:   Future     Expected Date:   4/21/2025     Expiration Date:   7/21/2025    Hemoglobin A1C     Standing Status:   Future     Expected Date:   4/21/2025     Expiration Date:   7/21/2025    Urinalysis     Standing Status:   Future     Expected Date:   4/21/2025     Expiration Date:   4/21/2026

## 2025-04-23 ENCOUNTER — OFFICE VISIT (OUTPATIENT)
Dept: FAMILY MEDICINE | Facility: CLINIC | Age: 35
End: 2025-04-23
Payer: COMMERCIAL

## 2025-04-23 VITALS
RESPIRATION RATE: 16 BRPM | HEIGHT: 70 IN | BODY MASS INDEX: 30.21 KG/M2 | HEART RATE: 98 BPM | DIASTOLIC BLOOD PRESSURE: 80 MMHG | SYSTOLIC BLOOD PRESSURE: 138 MMHG | OXYGEN SATURATION: 98 % | TEMPERATURE: 98 F | WEIGHT: 211 LBS

## 2025-04-23 DIAGNOSIS — K21.9 GASTROESOPHAGEAL REFLUX DISEASE, UNSPECIFIED WHETHER ESOPHAGITIS PRESENT: ICD-10-CM

## 2025-04-23 DIAGNOSIS — F41.9 ANXIETY: ICD-10-CM

## 2025-04-23 DIAGNOSIS — Z90.81 S/P SPLENECTOMY: ICD-10-CM

## 2025-04-23 DIAGNOSIS — F90.9 ATTENTION DEFICIT HYPERACTIVITY DISORDER (ADHD), UNSPECIFIED ADHD TYPE: ICD-10-CM

## 2025-04-23 DIAGNOSIS — B18.2 CHRONIC HEPATITIS C WITHOUT HEPATIC COMA: ICD-10-CM

## 2025-04-23 DIAGNOSIS — R06.02 SHORTNESS OF BREATH: ICD-10-CM

## 2025-04-23 DIAGNOSIS — I10 HYPERTENSION, UNSPECIFIED TYPE: ICD-10-CM

## 2025-04-23 DIAGNOSIS — Z00.00 WELLNESS EXAMINATION: Primary | ICD-10-CM

## 2025-04-23 PROCEDURE — 4010F ACE/ARB THERAPY RXD/TAKEN: CPT | Mod: CPTII,,, | Performed by: FAMILY MEDICINE

## 2025-04-23 PROCEDURE — 3075F SYST BP GE 130 - 139MM HG: CPT | Mod: CPTII,,, | Performed by: FAMILY MEDICINE

## 2025-04-23 PROCEDURE — 1159F MED LIST DOCD IN RCRD: CPT | Mod: CPTII,,, | Performed by: FAMILY MEDICINE

## 2025-04-23 PROCEDURE — 1160F RVW MEDS BY RX/DR IN RCRD: CPT | Mod: CPTII,,, | Performed by: FAMILY MEDICINE

## 2025-04-23 PROCEDURE — 99395 PREV VISIT EST AGE 18-39: CPT | Mod: ,,, | Performed by: FAMILY MEDICINE

## 2025-04-23 PROCEDURE — 3079F DIAST BP 80-89 MM HG: CPT | Mod: CPTII,,, | Performed by: FAMILY MEDICINE

## 2025-04-23 PROCEDURE — 3008F BODY MASS INDEX DOCD: CPT | Mod: CPTII,,, | Performed by: FAMILY MEDICINE

## 2025-04-23 RX ORDER — ALBUTEROL SULFATE 90 UG/1
1-2 INHALANT RESPIRATORY (INHALATION) EVERY 4 HOURS PRN
Qty: 18 G | Refills: 11 | Status: SHIPPED | OUTPATIENT
Start: 2025-04-23 | End: 2026-04-23

## 2025-04-23 RX ORDER — AMOXICILLIN AND CLAVULANATE POTASSIUM 875; 125 MG/1; MG/1
1 TABLET, FILM COATED ORAL 2 TIMES DAILY
Qty: 20 TABLET | Refills: 0 | Status: SHIPPED | OUTPATIENT
Start: 2025-04-23 | End: 2025-05-03

## 2025-04-23 RX ORDER — BUSPIRONE HYDROCHLORIDE 5 MG/1
5 TABLET ORAL 2 TIMES DAILY
COMMUNITY

## 2025-04-23 RX ORDER — LISINOPRIL AND HYDROCHLOROTHIAZIDE 10; 12.5 MG/1; MG/1
1 TABLET ORAL DAILY
Qty: 90 TABLET | Refills: 3 | Status: SHIPPED | OUTPATIENT
Start: 2025-04-23 | End: 2026-04-23

## 2025-04-23 NOTE — ASSESSMENT & PLAN NOTE
Stable on inhaler prn. Refill sent in as requested.     Pft 11/2024- pft- Moderate obstruction. Positive bronchodilator response. There is evidence of air trapping. Normal diffusion capacity.     Allergy panel 9/2024- negative      Monitor symptoms.  Contact clinic for concerns. Patient is agreeable to plan and verbalized understanding

## 2025-04-23 NOTE — ASSESSMENT & PLAN NOTE
S/p splenectomy 3/2024 in [place]. Believes he got pneumonia vaccine while in hospital in [place]. Records have been requested.   Keep appts with GI.     Up-to-date recommends daily antibiotic prophylaxis for adults at least one year following splenectomy.  We then can reassess after that to determine risk vs benefit of daily prophylaxis (potential for hypersensitivity, alteration of microbiome, drug resistance of pathogens, etc).  Rx for amoxicillin 500mg twice daily was sent in 8/2024. Prescription for augmentin for you to begin if you develop fever, chills, or any other concern for systemic infection was also sent in at that time.  He was advised to report to the ER asap if he has any of these symptoms.  He has had to use augmentin 3/2025 so we will send in refill to keep on hand. Has been taking amoxicillin as prescribed.     Recommend annual flu shot and pneumonia vaccine q5 years.

## 2025-04-23 NOTE — ASSESSMENT & PLAN NOTE
Fasting labs ordered. Will call with results when available  Denies family history of colon cancer  Declines immunizations today

## 2025-04-23 NOTE — PROGRESS NOTES
"Subjective:        Patient ID: Elsie Ahuja is a 34 y.o. male.    Chief Complaint: Annual Exam (Wellness/Review labs )      Patient presents to clinic unaccompanied for his wellness visit.  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 No trauma or injury. Not sure why it happened.  Got pneumonia shot while inpatient in [place].  We have requested records. Not received. Will request again.   Staples removed 4/2024. Patient had US (Monday 04/15/2023) and has follow up with GI (Dr. Balderas).  Had EGD on 04/30/2024. Was good. Is on ppi.   Denies fever.  is having regular bowel movements.  Does not do heavy lifting.  He denies any abdominal pain.  Has been going to gym.   Up-to-date recommends daily antibiotic prophylaxis for adults at least one year following splenectomy.  We then can reassess after that to determine risk vs benefit of daily prophylaxis (potential for hypersensitivity, alteration of microbiome, drug resistance of pathogens, etc).  Rx for amoxicillin 500mg twice daily was sent in 8/2024. Prescription for augmentin for you to begin if you develop fever, chills, or any other concern for systemic infection was also sent in at that time.  Took the augmentin rx about 1 month ago. Felt like was getting sick.  He was advised to report to the ER asap if he has any of these symptoms.  He was concerned because his pharmacist had questions about dose.     C/o shortness of breath since surgery.   allergy testing 9/2024 was negative. Using incentive spirometer.  No history of asthma as child. Symptoms usually worse at night.  We completed PFTS on 10/2024 which showed  "Moderate obstruction. Positive bronchodilator response. There is evidence of air trapping. Normal diffusion capacity."  He has an inhaler to use prn. Uses a few times a day.needs refill.    He was referred to pulm.  Has not heard from anyone.  Does not feel he needs to be seen at this time.      Had left carpal " "tunnel/ulnar nerve release surgery with dr. Palacio 5/7/24. Had infection after a dog bite and was admitted 5/18/24-5/20/24. Saw him 5/24/24 for follow up.   Doing well.  Not planning on doing right hand at this time.      Hep c screening was positive in 3/2023.  Did epclusa with dr. Balderas.  Has follow up but not sure when. Asking for labs.      He has htn. Reports compliance with meds- lisinopril -hctz.       has gerd.  On ppi.       He has anxiety and adhd.  Is on vyvanse and buspar.  Follows with psych, dr. Christy mejia.  Sees her q3 months.       He follows with dr. Momin for hormones.  Is no longer seeing him so not taking meds.   Will establish with urology     He is not allergic to any medications. He smokes cigarettes- has not smoked in a while.        Has family history of diabetes in both parents. No family history of colon cancer.  He is an  in [employer].            Review of Systems   Constitutional: Negative.    HENT: Negative.     Eyes: Negative.    Respiratory: Negative.     Cardiovascular: Negative.    Gastrointestinal: Negative.    Endocrine: Negative.    Genitourinary: Negative.    Musculoskeletal: Negative.    Skin: Negative.    Allergic/Immunologic: Negative.    Neurological: Negative.    Hematological: Negative.    Psychiatric/Behavioral: Negative.     All other systems reviewed and are negative.        Review of patient's allergies indicates:   Allergen Reactions    Sulfa (sulfonamide antibiotics) Nausea And Vomiting and Nausea Only      Vitals:    04/23/25 1401   BP: 138/80   BP Location: Left arm   Patient Position: Sitting   Pulse: 98   Resp: 16   Temp: 98.4 °F (36.9 °C)   TempSrc: Oral   SpO2: 98%   Weight: 95.7 kg (211 lb)   Height: 5' 10" (1.778 m)      Social History     Socioeconomic History    Marital status:    Tobacco Use    Smoking status: Former     Current packs/day: 0.25     Average packs/day: 0.3 packs/day for 5.2 years (1.3 ttl pk-yrs)     Types: Cigarettes "    Smokeless tobacco: Never    Tobacco comments:     Smokes socially. Less than 1 pack in 6 months    Substance and Sexual Activity    Alcohol use: Not Currently    Drug use: Never     Types: Marijuana    Sexual activity: Yes     Partners: Female     Birth control/protection: I.U.D.     Social Drivers of Health     Financial Resource Strain: Low Risk  (4/1/2024)    Overall Financial Resource Strain (CARDIA)     Difficulty of Paying Living Expenses: Not very hard   Food Insecurity: No Food Insecurity (4/1/2024)    Hunger Vital Sign     Worried About Running Out of Food in the Last Year: Never true     Ran Out of Food in the Last Year: Never true   Transportation Needs: No Transportation Needs (4/1/2024)    PRAPARE - Transportation     Lack of Transportation (Medical): No     Lack of Transportation (Non-Medical): No   Physical Activity: Sufficiently Active (4/1/2024)    Exercise Vital Sign     Days of Exercise per Week: 7 days     Minutes of Exercise per Session: 90 min   Stress: Stress Concern Present (4/1/2024)    [employer] - Occupational Stress Questionnaire     Feeling of Stress : To some extent   Housing Stability: Low Risk  (4/1/2024)    Housing Stability Vital Sign     Unable to Pay for Housing in the Last Year: No     Number of Places Lived in the Last Year: 1     Unstable Housing in the Last Year: No      Family History   Problem Relation Name Age of Onset    Arthritis Mother Meghann Ahuja     Depression Mother Meghann Ahuja     Hyperlipidemia Mother Meghann Ahuja     Hypertension Mother Meghann Ahuja     Arthritis Father Amando Ahuja     Diabetes Father Amando Ahuja     Hearing loss Father Amando Ahuja     Hyperlipidemia Father Amando Ahuja     Hypertension Father Amando Ahuja           Objective:     Physical Exam  Vitals and nursing note reviewed.   Constitutional:       Appearance: Normal appearance. He is obese.   HENT:      Head: Normocephalic.      Nose: Nose normal.       Mouth/Throat:      Mouth: Mucous membranes are moist.      Pharynx: Oropharynx is clear.   Eyes:      Extraocular Movements: Extraocular movements intact.   Cardiovascular:      Rate and Rhythm: Normal rate and regular rhythm.   Pulmonary:      Effort: Pulmonary effort is normal.      Breath sounds: Normal breath sounds.   Musculoskeletal:         General: Normal range of motion.   Skin:     General: Skin is warm and dry.   Neurological:      General: No focal deficit present.      Mental Status: He is alert and oriented to person, place, and time. Mental status is at baseline.   Psychiatric:         Mood and Affect: Mood normal.       Medications Ordered Prior to Encounter[1]  Health Maintenance   Topic Date Due    COVID-19 Vaccine (5 - 2024-25 season) 09/01/2024    Hemoglobin A1c (Diabetic Prevention Screening)  05/18/2027    Lipid Panel  02/25/2030    TETANUS VACCINE  02/02/2033    RSV Vaccine (Age 60+ and Pregnant patients) (1 - 1-dose 75+ series) 09/22/2063    Hepatitis C Screening  Completed    Influenza Vaccine  Completed    HIV Screening  Completed    Pneumococcal Vaccines (Age 0-49)  Completed      Results for orders placed or performed in visit on 02/25/25   Comprehensive Metabolic Panel    Collection Time: 02/25/25  7:43 AM   Result Value Ref Range    Sodium 142 136 - 145 mmol/L    Potassium 4.2 3.5 - 5.1 mmol/L    Chloride 105 98 - 107 mmol/L    CO2 29 22 - 29 mmol/L    Glucose 102 (H) 74 - 100 mg/dL    Blood Urea Nitrogen 19.5 8.9 - 20.6 mg/dL    Creatinine 1.17 0.72 - 1.25 mg/dL    Calcium 9.2 8.4 - 10.2 mg/dL    Protein Total 7.0 6.4 - 8.3 gm/dL    Albumin 4.0 3.5 - 5.0 g/dL    Globulin 3.0 2.4 - 3.5 gm/dL    Albumin/Globulin Ratio 1.3 1.1 - 2.0 ratio    Bilirubin Total 0.6 <=1.5 mg/dL    ALP 72 40 - 150 unit/L    ALT 31 0 - 55 unit/L    AST 28 5 - 34 unit/L    eGFR >60 mL/min/1.73/m2    Anion Gap 8.0 mEq/L    BUN/Creatinine Ratio 17    Lipid Panel    Collection Time: 02/25/25  7:43 AM   Result Value  Ref Range    Cholesterol Total 162 <=200 mg/dL    HDL Cholesterol 64 (H) 35 - 60 mg/dL    Triglyceride 57 34 - 140 mg/dL    Cholesterol/HDL Ratio 3 0 - 5    Very Low Density Lipoprotein 11     LDL Cholesterol 87.00 50.00 - 140.00 mg/dL   TSH    Collection Time: 02/25/25  7:43 AM   Result Value Ref Range    TSH 0.884 0.350 - 4.940 uIU/mL   Urinalysis, Reflex to Urine Culture    Collection Time: 02/25/25  7:43 AM    Specimen: Urine   Result Value Ref Range    Color, UA Straw Yellow, Light-Yellow, Dark Yellow, Maria T, Straw    Appearance, UA Clear Clear    Specific Gravity, UA 1.020 1.005 - 1.030    pH, UA 7.0 5.0 - 8.5    Protein, UA Negative Negative    Glucose, UA Negative Negative, Normal    Ketones, UA Negative Negative    Blood, UA Negative Negative    Bilirubin, UA Negative Negative    Urobilinogen, UA 0.2 0.2, 1.0, Normal    Nitrites, UA Negative Negative    Leukocyte Esterase, UA Negative Negative   CBC with Differential    Collection Time: 02/25/25  7:43 AM   Result Value Ref Range    WBC 6.65 4.50 - 11.50 x10(3)/mcL    RBC 5.41 4.70 - 6.10 x10(6)/mcL    Hgb 15.6 14.0 - 18.0 g/dL    Hct 47.0 42.0 - 52.0 %    MCV 86.9 80.0 - 94.0 fL    MCH 28.8 27.0 - 31.0 pg    MCHC 33.2 33.0 - 36.0 g/dL    RDW 14.5 11.5 - 17.0 %    Platelet 359 130 - 400 x10(3)/mcL    MPV 10.2 7.4 - 10.4 fL    Neut % 41.8 %    Lymph % 36.4 %    Mono % 14.3 %    Eos % 5.6 %    Basophil % 1.4 %    Imm Grans % 0.5 %    Neut # 2.79 2.1 - 9.2 x10(3)/mcL    Lymph # 2.42 0.6 - 4.6 x10(3)/mcL    Mono # 0.95 0.1 - 1.3 x10(3)/mcL    Eos # 0.37 0 - 0.9 x10(3)/mcL    Baso # 0.09 <=0.2 x10(3)/mcL    Imm Gran # 0.03 0.00 - 0.04 x10(3)/mcL    NRBC% 0.0 %          Assessment & Plan:     Active Problem List with Overview Notes    Diagnosis Date Noted    GERD (gastroesophageal reflux disease)     Abnormal PFTs (pulmonary function tests) 01/03/2025    Shortness of breath 09/16/2024    Bilateral carpal tunnel syndrome 05/07/2024    Hypertension 05/02/2024    S/P  splenectomy 05/02/2024    Carpal tunnel syndrome     Chronic hepatitis C without hepatic coma 07/10/2023    Nocturia 07/10/2023    Hernia of abdominal wall 07/10/2023    Screen for STD (sexually transmitted disease) 07/10/2023    Cigarette smoker 03/07/2023    Wellness examination 03/07/2023    Family history of diabetes mellitus 03/07/2023    Onychomycosis 03/07/2023    Anxiety     ADHD (attention deficit hyperactivity disorder)        1. Wellness examination  Assessment & Plan:  Fasting labs ordered. Will call with results when available  Denies family history of colon cancer  Declines immunizations today    Orders:  -     Hepatitis C Viral(HCV) RNA, Quant Real-Time PCR w/Reflexs; Future; Expected date: 04/23/2025  -     Hepatitis C Virus Quantitative; Future; Expected date: 04/23/2025    2. S/P splenectomy  Assessment & Plan:  S/p splenectomy 3/2024 in [place]. Believes he got pneumonia vaccine while in hospital in [place]. Records have been requested.   Keep appts with GI.     Up-to-date recommends daily antibiotic prophylaxis for adults at least one year following splenectomy.  We then can reassess after that to determine risk vs benefit of daily prophylaxis (potential for hypersensitivity, alteration of microbiome, drug resistance of pathogens, etc).  Rx for amoxicillin 500mg twice daily was sent in 8/2024. Prescription for augmentin for you to begin if you develop fever, chills, or any other concern for systemic infection was also sent in at that time.  He was advised to report to the ER asap if he has any of these symptoms.  He has had to use augmentin 3/2025 so we will send in refill to keep on hand. Has been taking amoxicillin as prescribed.     Recommend annual flu shot and pneumonia vaccine q5 years.     Orders:  -     Hepatitis C Viral(HCV) RNA, Quant Real-Time PCR w/Reflexs; Future; Expected date: 04/23/2025  -     Hepatitis C Virus Quantitative; Future; Expected date: 04/23/2025    3. Shortness of  breath  Assessment & Plan:  Stable on inhaler prn. Refill sent in as requested.     Pft 11/2024- pft- Moderate obstruction. Positive bronchodilator response. There is evidence of air trapping. Normal diffusion capacity.     Allergy panel 9/2024- negative      Monitor symptoms.  Contact clinic for concerns. Patient is agreeable to plan and verbalized understanding    Orders:  -     albuterol (VENTOLIN HFA) 90 mcg/actuation inhaler; Inhale 1-2 puffs into the lungs every 4 (four) hours as needed for Wheezing or Shortness of Breath. Rescue  Dispense: 18 g; Refill: 11    4. Attention deficit hyperactivity disorder (ADHD), unspecified ADHD type  Assessment & Plan:  Stable on vyvanse.  Keep appts with psych    Orders:  -     Hepatitis C Viral(HCV) RNA, Quant Real-Time PCR w/Reflexs; Future; Expected date: 04/23/2025  -     Hepatitis C Virus Quantitative; Future; Expected date: 04/23/2025    5. Anxiety  Assessment & Plan:  Stable on buspar. Keep appts with psych      6. Hypertension, unspecified type  Assessment & Plan:  Well controlled on current prescription med- lisinopril-hctz    Orders:  -     lisinopriL-hydrochlorothiazide (PRINZIDE,ZESTORETIC) 10-12.5 mg per tablet; Take 1 tablet by mouth once daily.  Dispense: 90 tablet; Refill: 3    7. Chronic hepatitis C without hepatic coma  Assessment & Plan:  Treatment (epclusa) completed with Dr. Balderas (GI). Asking to add hep c labs.  Keep scheduled appts with dr. Balderas.       8. Gastroesophageal reflux disease, unspecified whether esophagitis present  Assessment & Plan:  Stable on ppi. Keep appts with GI      Other orders  -     amoxicillin-clavulanate 875-125mg (AUGMENTIN) 875-125 mg per tablet; Take 1 tablet by mouth 2 (two) times daily. for 10 days  Dispense: 20 tablet; Refill: 0         Follow up in about 1 year (around 4/23/2026) for Wellness with Labs.          [1]   Current Outpatient Medications on File Prior to Visit   Medication Sig Dispense Refill     amoxicillin (AMOXIL) 500 MG capsule Take 1 capsule (500 mg total) by mouth every 12 (twelve) hours. 180 capsule 0    busPIRone (BUSPAR) 5 MG Tab Take 5 mg by mouth 2 (two) times daily.      tadalafiL (CIALIS) 20 MG Tab Take 20 mg by mouth daily as needed.      VYVANSE 50 mg capsule Take 50 mg by mouth every morning.      [DISCONTINUED] albuterol (VENTOLIN HFA) 90 mcg/actuation inhaler Inhale 1-2 puffs into the lungs every 4 (four) hours as needed for Wheezing or Shortness of Breath. Rescue 18 g 11    [DISCONTINUED] lisinopriL-hydrochlorothiazide (PRINZIDE,ZESTORETIC) 10-12.5 mg per tablet TAKE ONE TABLET BY MOUTH ONE TIME DAILY 30 tablet 5    pantoprazole (PROTONIX) 40 MG tablet Take 1 tablet (40 mg total) by mouth once daily. 30 tablet 11     No current facility-administered medications on file prior to visit.

## 2025-04-23 NOTE — ASSESSMENT & PLAN NOTE
Treatment (epclusa) completed with Dr. Balderas (). Asking to add hep c labs.  Keep scheduled appts with dr. Balderas.

## 2025-04-24 ENCOUNTER — LAB VISIT (OUTPATIENT)
Dept: LAB | Facility: HOSPITAL | Age: 35
End: 2025-04-24
Attending: FAMILY MEDICINE
Payer: COMMERCIAL

## 2025-04-24 ENCOUNTER — RESULTS FOLLOW-UP (OUTPATIENT)
Dept: FAMILY MEDICINE | Facility: CLINIC | Age: 35
End: 2025-04-24

## 2025-04-24 DIAGNOSIS — I10 PRIMARY HYPERTENSION: ICD-10-CM

## 2025-04-24 DIAGNOSIS — I10 HYPERTENSION, UNSPECIFIED TYPE: Primary | ICD-10-CM

## 2025-04-24 DIAGNOSIS — Z90.81 S/P SPLENECTOMY: ICD-10-CM

## 2025-04-24 DIAGNOSIS — Z00.00 WELLNESS EXAMINATION: ICD-10-CM

## 2025-04-24 DIAGNOSIS — F90.9 ATTENTION DEFICIT HYPERACTIVITY DISORDER (ADHD), UNSPECIFIED ADHD TYPE: ICD-10-CM

## 2025-04-24 LAB
ALBUMIN SERPL-MCNC: 3.6 G/DL (ref 3.5–5)
ALBUMIN/GLOB SERPL: 1.2 RATIO (ref 1.1–2)
ALP SERPL-CCNC: 61 UNIT/L (ref 40–150)
ALT SERPL-CCNC: 44 UNIT/L (ref 0–55)
ANION GAP SERPL CALC-SCNC: 8 MEQ/L
AST SERPL-CCNC: 70 UNIT/L (ref 11–45)
BASOPHILS # BLD AUTO: 0.08 X10(3)/MCL
BASOPHILS NFR BLD AUTO: 0.6 %
BILIRUB SERPL-MCNC: 0.3 MG/DL
BUN SERPL-MCNC: 20.4 MG/DL (ref 8.9–20.6)
CALCIUM SERPL-MCNC: 9 MG/DL (ref 8.4–10.2)
CHLORIDE SERPL-SCNC: 107 MMOL/L (ref 98–107)
CHOLEST SERPL-MCNC: 117 MG/DL
CHOLEST/HDLC SERPL: 4 {RATIO} (ref 0–5)
CO2 SERPL-SCNC: 27 MMOL/L (ref 22–29)
CREAT SERPL-MCNC: 1.13 MG/DL (ref 0.72–1.25)
CREAT/UREA NIT SERPL: 18
EOSINOPHIL # BLD AUTO: 0.28 X10(3)/MCL (ref 0–0.9)
EOSINOPHIL NFR BLD AUTO: 2.1 %
ERYTHROCYTE [DISTWIDTH] IN BLOOD BY AUTOMATED COUNT: 14.6 % (ref 11.5–17)
EST. AVERAGE GLUCOSE BLD GHB EST-MCNC: 93.9 MG/DL
GFR SERPLBLD CREATININE-BSD FMLA CKD-EPI: >60 ML/MIN/1.73/M2
GLOBULIN SER-MCNC: 3 GM/DL (ref 2.4–3.5)
GLUCOSE SERPL-MCNC: 89 MG/DL (ref 74–100)
HBA1C MFR BLD: 4.9 %
HCT VFR BLD AUTO: 44.3 % (ref 42–52)
HDLC SERPL-MCNC: 33 MG/DL (ref 35–60)
HGB BLD-MCNC: 14.8 G/DL (ref 14–18)
IMM GRANULOCYTES # BLD AUTO: 0.05 X10(3)/MCL (ref 0–0.04)
IMM GRANULOCYTES NFR BLD AUTO: 0.4 %
LDLC SERPL CALC-MCNC: 65 MG/DL (ref 50–140)
LYMPHOCYTES # BLD AUTO: 1.6 X10(3)/MCL (ref 0.6–4.6)
LYMPHOCYTES NFR BLD AUTO: 12 %
MCH RBC QN AUTO: 30.2 PG (ref 27–31)
MCHC RBC AUTO-ENTMCNC: 33.4 G/DL (ref 33–36)
MCV RBC AUTO: 90.4 FL (ref 80–94)
MONOCYTES # BLD AUTO: 1.43 X10(3)/MCL (ref 0.1–1.3)
MONOCYTES NFR BLD AUTO: 10.7 %
NEUTROPHILS # BLD AUTO: 9.88 X10(3)/MCL (ref 2.1–9.2)
NEUTROPHILS NFR BLD AUTO: 74.2 %
NRBC BLD AUTO-RTO: 0 %
PLATELET # BLD AUTO: 373 X10(3)/MCL (ref 130–400)
PMV BLD AUTO: 10.1 FL (ref 7.4–10.4)
POTASSIUM SERPL-SCNC: 4.1 MMOL/L (ref 3.5–5.1)
PROT SERPL-MCNC: 6.6 GM/DL (ref 6.4–8.3)
RBC # BLD AUTO: 4.9 X10(6)/MCL (ref 4.7–6.1)
SODIUM SERPL-SCNC: 142 MMOL/L (ref 136–145)
TRIGL SERPL-MCNC: 95 MG/DL (ref 34–140)
TSH SERPL-ACNC: 1.09 UIU/ML (ref 0.35–4.94)
VLDLC SERPL CALC-MCNC: 19 MG/DL
WBC # BLD AUTO: 13.32 X10(3)/MCL (ref 4.5–11.5)

## 2025-04-24 PROCEDURE — 87522 HEPATITIS C REVRS TRNSCRPJ: CPT

## 2025-04-24 PROCEDURE — 36415 COLL VENOUS BLD VENIPUNCTURE: CPT

## 2025-04-24 PROCEDURE — 80053 COMPREHEN METABOLIC PANEL: CPT

## 2025-04-24 PROCEDURE — 84443 ASSAY THYROID STIM HORMONE: CPT

## 2025-04-24 PROCEDURE — 85025 COMPLETE CBC W/AUTO DIFF WBC: CPT

## 2025-04-24 PROCEDURE — 83036 HEMOGLOBIN GLYCOSYLATED A1C: CPT

## 2025-04-24 PROCEDURE — 80061 LIPID PANEL: CPT

## 2025-04-24 NOTE — PROGRESS NOTES
Please inform patient of results.    1. Ast is elevated. Other liver fx wnl. Avoid tylenol, etoh and herbal teas. Encourage fluids. Monitor.  History of hep c treated with dr. Balderas.   2. Hdl is low. Supplement with fish oil otc.   3. Wbc is elevated. Neutrophils are elevated. Any recent illness or immunizations? We may want to repeat this in 1 month.     Hep c labs are pending. Will call and forward to dr. Balderas per patient request    I don't see the std labs were done??    Other labwork within acceptable ranges.

## 2025-04-25 ENCOUNTER — LAB VISIT (OUTPATIENT)
Dept: LAB | Facility: HOSPITAL | Age: 35
End: 2025-04-25
Attending: FAMILY MEDICINE
Payer: COMMERCIAL

## 2025-04-25 DIAGNOSIS — Z11.3 SCREEN FOR STD (SEXUALLY TRANSMITTED DISEASE): ICD-10-CM

## 2025-04-25 LAB
BILIRUB UR QL STRIP.AUTO: NEGATIVE
C TRACH DNA SPEC QL NAA+PROBE: NOT DETECTED
CLARITY UR: CLEAR
COLOR UR AUTO: YELLOW
GLUCOSE UR QL STRIP: NEGATIVE
HAV IGM SERPL QL IA: NONREACTIVE
HBV CORE IGM SERPL QL IA: NONREACTIVE
HBV SURFACE AG SERPL QL IA: NONREACTIVE
HCV AB SERPL QL IA: REACTIVE
HCV RNA SERPL NAA+PROBE-ACNC: NORMAL IU/ML
HGB UR QL STRIP: NEGATIVE
HIV 1+2 AB+HIV1 P24 AG SERPL QL IA: NONREACTIVE
KETONES UR QL STRIP: NEGATIVE
LEUKOCYTE ESTERASE UR QL STRIP: NEGATIVE
N GONORRHOEA DNA SPEC QL NAA+PROBE: NOT DETECTED
NITRITE UR QL STRIP: NEGATIVE
PH UR STRIP: 7 [PH]
PROT UR QL STRIP: NEGATIVE
SOURCE (OHS): NORMAL
SP GR UR STRIP.AUTO: 1.01 (ref 1–1.03)
UROBILINOGEN UR STRIP-ACNC: 1

## 2025-04-25 PROCEDURE — 87591 N.GONORRHOEAE DNA AMP PROB: CPT

## 2025-04-25 PROCEDURE — 81003 URINALYSIS AUTO W/O SCOPE: CPT | Performed by: FAMILY MEDICINE

## 2025-04-25 PROCEDURE — 87389 HIV-1 AG W/HIV-1&-2 AB AG IA: CPT

## 2025-04-25 PROCEDURE — 80074 ACUTE HEPATITIS PANEL: CPT

## 2025-04-25 PROCEDURE — 87661 TRICHOMONAS VAGINALIS AMPLIF: CPT

## 2025-04-27 LAB
SPECIMEN SOURCE: NORMAL
T VAGINALIS RRNA SPEC QL NAA+PROBE: NEGATIVE

## 2025-04-28 ENCOUNTER — RESULTS FOLLOW-UP (OUTPATIENT)
Dept: FAMILY MEDICINE | Facility: CLINIC | Age: 35
End: 2025-04-28

## 2025-07-21 ENCOUNTER — PATIENT MESSAGE (OUTPATIENT)
Dept: FAMILY MEDICINE | Facility: CLINIC | Age: 35
End: 2025-07-21
Payer: COMMERCIAL

## 2025-07-21 DIAGNOSIS — I10 PRIMARY HYPERTENSION: Primary | ICD-10-CM

## 2025-07-22 NOTE — TELEPHONE ENCOUNTER
Call patient to schedule visit. Have him bring his recent labs he is referencing so we can see.  We can discuss and see what other labs are needed at visit.

## 2025-07-23 NOTE — TELEPHONE ENCOUNTER
Cbc will check his wbc.  Without being able to review his outside labs I am unsure if any other tests are needed      I have signed for the following orders AND/OR meds.  Please call the patient and ask the patient to schedule the testing AND/OR inform about any medications that were sent.      Orders Placed This Encounter   Procedures    CBC Auto Differential     Standing Status:   Future     Expected Date:   7/23/2025     Expiration Date:   10/23/2025    Comprehensive Metabolic Panel     Standing Status:   Future     Expected Date:   7/23/2025     Expiration Date:   10/23/2025

## 2025-07-23 NOTE — TELEPHONE ENCOUNTER
Ast was elevated. Cmp is already ordered.   Wbc was elevated and platelets.  They did not do a diff.  Ordered with cbc.  Any recent illness or vaccine?     It also says your estradiol and testosterone were low.  I do not treat that but we can refer to urology if he would like.

## 2025-07-24 ENCOUNTER — OFFICE VISIT (OUTPATIENT)
Dept: FAMILY MEDICINE | Facility: CLINIC | Age: 35
End: 2025-07-24
Payer: COMMERCIAL

## 2025-07-24 ENCOUNTER — LAB VISIT (OUTPATIENT)
Dept: LAB | Facility: HOSPITAL | Age: 35
End: 2025-07-24
Attending: FAMILY MEDICINE
Payer: COMMERCIAL

## 2025-07-24 VITALS
SYSTOLIC BLOOD PRESSURE: 136 MMHG | TEMPERATURE: 99 F | WEIGHT: 200 LBS | DIASTOLIC BLOOD PRESSURE: 84 MMHG | HEART RATE: 78 BPM | BODY MASS INDEX: 28.63 KG/M2 | HEIGHT: 70 IN | OXYGEN SATURATION: 98 % | RESPIRATION RATE: 16 BRPM

## 2025-07-24 DIAGNOSIS — I10 PRIMARY HYPERTENSION: ICD-10-CM

## 2025-07-24 DIAGNOSIS — R79.89 LOW TESTOSTERONE: Primary | ICD-10-CM

## 2025-07-24 DIAGNOSIS — D72.829 LEUKOCYTOSIS, UNSPECIFIED TYPE: ICD-10-CM

## 2025-07-24 LAB
ALBUMIN SERPL-MCNC: 3.6 G/DL (ref 3.5–5)
ALBUMIN/GLOB SERPL: 1.1 RATIO (ref 1.1–2)
ALP SERPL-CCNC: 73 UNIT/L (ref 40–150)
ALT SERPL-CCNC: 39 UNIT/L (ref 0–55)
ANION GAP SERPL CALC-SCNC: 6 MEQ/L
AST SERPL-CCNC: 51 UNIT/L (ref 11–45)
BASOPHILS # BLD AUTO: 0.1 X10(3)/MCL
BASOPHILS NFR BLD AUTO: 1.2 %
BILIRUB SERPL-MCNC: 0.7 MG/DL
BUN SERPL-MCNC: 23.5 MG/DL (ref 8.9–20.6)
CALCIUM SERPL-MCNC: 9.3 MG/DL (ref 8.4–10.2)
CHLORIDE SERPL-SCNC: 104 MMOL/L (ref 98–107)
CO2 SERPL-SCNC: 30 MMOL/L (ref 22–29)
CREAT SERPL-MCNC: 1.23 MG/DL (ref 0.72–1.25)
CREAT/UREA NIT SERPL: 19
EOSINOPHIL # BLD AUTO: 0.49 X10(3)/MCL (ref 0–0.9)
EOSINOPHIL NFR BLD AUTO: 5.6 %
ERYTHROCYTE [DISTWIDTH] IN BLOOD BY AUTOMATED COUNT: 15.2 % (ref 11.5–17)
GFR SERPLBLD CREATININE-BSD FMLA CKD-EPI: >60 ML/MIN/1.73/M2
GLOBULIN SER-MCNC: 3.3 GM/DL (ref 2.4–3.5)
GLUCOSE SERPL-MCNC: 96 MG/DL (ref 74–100)
HCT VFR BLD AUTO: 47.3 % (ref 42–52)
HGB BLD-MCNC: 15.7 G/DL (ref 14–18)
IMM GRANULOCYTES # BLD AUTO: 0.03 X10(3)/MCL (ref 0–0.04)
IMM GRANULOCYTES NFR BLD AUTO: 0.3 %
LYMPHOCYTES # BLD AUTO: 2.51 X10(3)/MCL (ref 0.6–4.6)
LYMPHOCYTES NFR BLD AUTO: 28.9 %
MCH RBC QN AUTO: 28.8 PG (ref 27–31)
MCHC RBC AUTO-ENTMCNC: 33.2 G/DL (ref 33–36)
MCV RBC AUTO: 86.8 FL (ref 80–94)
MONOCYTES # BLD AUTO: 1.13 X10(3)/MCL (ref 0.1–1.3)
MONOCYTES NFR BLD AUTO: 13 %
NEUTROPHILS # BLD AUTO: 4.43 X10(3)/MCL (ref 2.1–9.2)
NEUTROPHILS NFR BLD AUTO: 51 %
NRBC BLD AUTO-RTO: 0 %
PLATELET # BLD AUTO: 370 X10(3)/MCL (ref 130–400)
PMV BLD AUTO: 10.1 FL (ref 7.4–10.4)
POTASSIUM SERPL-SCNC: 4.8 MMOL/L (ref 3.5–5.1)
PROT SERPL-MCNC: 6.9 GM/DL (ref 6.4–8.3)
RBC # BLD AUTO: 5.45 X10(6)/MCL (ref 4.7–6.1)
SODIUM SERPL-SCNC: 140 MMOL/L (ref 136–145)
WBC # BLD AUTO: 8.69 X10(3)/MCL (ref 4.5–11.5)

## 2025-07-24 PROCEDURE — 3044F HG A1C LEVEL LT 7.0%: CPT | Mod: CPTII,,, | Performed by: FAMILY MEDICINE

## 2025-07-24 PROCEDURE — 36415 COLL VENOUS BLD VENIPUNCTURE: CPT

## 2025-07-24 PROCEDURE — 85025 COMPLETE CBC W/AUTO DIFF WBC: CPT

## 2025-07-24 PROCEDURE — 3008F BODY MASS INDEX DOCD: CPT | Mod: CPTII,,, | Performed by: FAMILY MEDICINE

## 2025-07-24 PROCEDURE — 3079F DIAST BP 80-89 MM HG: CPT | Mod: CPTII,,, | Performed by: FAMILY MEDICINE

## 2025-07-24 PROCEDURE — 4010F ACE/ARB THERAPY RXD/TAKEN: CPT | Mod: CPTII,,, | Performed by: FAMILY MEDICINE

## 2025-07-24 PROCEDURE — 3075F SYST BP GE 130 - 139MM HG: CPT | Mod: CPTII,,, | Performed by: FAMILY MEDICINE

## 2025-07-24 PROCEDURE — 99214 OFFICE O/P EST MOD 30 MIN: CPT | Mod: ,,, | Performed by: FAMILY MEDICINE

## 2025-07-24 PROCEDURE — 80053 COMPREHEN METABOLIC PANEL: CPT

## 2025-07-24 PROCEDURE — 1159F MED LIST DOCD IN RCRD: CPT | Mod: CPTII,,, | Performed by: FAMILY MEDICINE

## 2025-07-24 PROCEDURE — 1160F RVW MEDS BY RX/DR IN RCRD: CPT | Mod: CPTII,,, | Performed by: FAMILY MEDICINE

## 2025-07-24 RX ORDER — AMOXICILLIN 500 MG/1
500 CAPSULE ORAL EVERY 12 HOURS
COMMUNITY
Start: 2025-06-27

## 2025-07-24 NOTE — ASSESSMENT & PLAN NOTE
Seen on labs from outside provider 2/2025. Denied any recent illness or vaccination.  Repeat labs today reviewed with patient today at visit. Cbc and diff is wnl.  Monitor.

## 2025-07-24 NOTE — ASSESSMENT & PLAN NOTE
Low on labs from outside provider 2/2025. Will be following with specialist for supplementation.  Has not started meds yet.

## 2025-07-24 NOTE — PROGRESS NOTES
Subjective:        Patient ID: Elsie Ahuja is a 34 y.o. male.    Chief Complaint: Follow-up (Lab work follow up )      Patient presents to clinic unaccompanied for complaint. He is due for his wellness visit in April    Did labs at outside facility per another provider 2/2025.  His testosterone was low and his wbc was elevated. Diff was not done.  He denies any illness or immunization at time of lab draw.  Denies any current symptoms.  He did repeat labs prior to his appt and they were reviewed with patient at time of appt today.  Wbc is wnl and diff is wnl as well.   Has not started testosterone supplementation yet.  His ast was still elevated but trending down.  Discussed to avoid hepatotoxic drugs. Monitor.            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 No trauma or injury. Not sure why it happened.  Got pneumonia shot while inpatient in [place].  We have requested records. Not received. Will request again.   Staples removed 4/2024. Patient had US (Monday 04/15/2023) and has follow up with GI (Dr. Balderas).  Had EGD on 04/30/2024. Was good. Is on ppi.   Denies fever.  is having regular bowel movements.  Does not do heavy lifting.  He denies any abdominal pain.  Has been going to gym.   Up-to-date recommends daily antibiotic prophylaxis for adults at least one year following splenectomy.  We then can reassess after that to determine risk vs benefit of daily prophylaxis (potential for hypersensitivity, alteration of microbiome, drug resistance of pathogens, etc).  Rx for amoxicillin 500mg twice daily was sent in 8/2024. Prescription for augmentin for you to begin if you develop fever, chills, or any other concern for systemic infection was also sent in at that time.  Took the augmentin rx about 1 month ago. Felt like was getting sick.  He was advised to report to the ER asap if he has any of these symptoms.  He was concerned because his pharmacist had questions about dose.     C/o shortness  "of breath since surgery.   allergy testing 9/2024 was negative. Using incentive spirometer.  No history of asthma as child. Symptoms usually worse at night.  We completed PFTS on 10/2024 which showed  "Moderate obstruction. Positive bronchodilator response. There is evidence of air trapping. Normal diffusion capacity."  He has an inhaler to use prn. Uses a few times a day.needs refill.    He was referred to pulm.  Has not heard from anyone.  Does not feel he needs to be seen at this time.      Had left carpal tunnel/ulnar nerve release surgery with dr. Palacio 5/7/24. Had infection after a dog bite and was admitted 5/18/24-5/20/24. Saw him 5/24/24 for follow up.   Doing well.  Not planning on doing right hand at this time.      Hep c screening was positive in 3/2023.  Did epclusa with dr. Balderas.  Has follow up but not sure when. Asking for labs.      He has htn. Reports compliance with meds- lisinopril -hctz.       has gerd.  On ppi.       He has anxiety and adhd.  Is on vyvanse and buspar.  Follows with psych, dr. Christy mejia.  Sees her q3 months.       He follows with dr. Momin for hormones.  Is no longer seeing him so not taking meds.   Will establish with urology     He is not allergic to any medications. He smokes cigarettes- has not smoked in a while.        Has family history of diabetes in both parents. No family history of colon cancer.  He is an  in [employer].            Review of Systems   Constitutional: Negative.    HENT: Negative.     Eyes: Negative.    Respiratory: Negative.     Cardiovascular: Negative.    Gastrointestinal: Negative.    Endocrine: Negative.    Genitourinary: Negative.    Musculoskeletal: Negative.    Skin: Negative.    Allergic/Immunologic: Negative.    Neurological: Negative.    Hematological: Negative.    Psychiatric/Behavioral: Negative.     All other systems reviewed and are negative.        Review of patient's allergies indicates:   Allergen Reactions    Sulfa " "(sulfonamide antibiotics) Nausea And Vomiting and Nausea Only      Vitals:    07/24/25 1019   BP: 136/84   BP Location: Left arm   Patient Position: Sitting   Pulse: 78   Resp: 16   Temp: 98.7 °F (37.1 °C)   TempSrc: Oral   SpO2: 98%   Weight: 90.7 kg (200 lb)   Height: 5' 10" (1.778 m)      Social History     Socioeconomic History    Marital status:    Tobacco Use    Smoking status: Former     Current packs/day: 0.25     Average packs/day: 0.3 packs/day for 5.2 years (1.3 ttl pk-yrs)     Types: Cigarettes    Smokeless tobacco: Never    Tobacco comments:     Smokes socially. Less than 1 pack in 6 months    Substance and Sexual Activity    Alcohol use: Not Currently    Drug use: Never     Types: Marijuana    Sexual activity: Yes     Partners: Female     Birth control/protection: I.U.D.     Social Drivers of Health     Financial Resource Strain: Low Risk  (4/1/2024)    Overall Financial Resource Strain (CARDIA)     Difficulty of Paying Living Expenses: Not very hard   Food Insecurity: No Food Insecurity (4/1/2024)    Hunger Vital Sign     Worried About Running Out of Food in the Last Year: Never true     Ran Out of Food in the Last Year: Never true   Transportation Needs: No Transportation Needs (4/1/2024)    PRAPARE - Transportation     Lack of Transportation (Medical): No     Lack of Transportation (Non-Medical): No   Physical Activity: Sufficiently Active (4/1/2024)    Exercise Vital Sign     Days of Exercise per Week: 7 days     Minutes of Exercise per Session: 90 min   Stress: Stress Concern Present (4/1/2024)    [employer] - Occupational Stress Questionnaire     Feeling of Stress : To some extent   Housing Stability: Low Risk  (4/1/2024)    Housing Stability Vital Sign     Unable to Pay for Housing in the Last Year: No     Number of Places Lived in the Last Year: 1     Unstable Housing in the Last Year: No      Family History   Problem Relation Name Age of Onset    Arthritis " Mother Meghann Ahuja     Depression Mother Meghann Ahuja     Hyperlipidemia Mother Meghann Ahuja     Hypertension Mother Meghann Ahuja     Arthritis Father Amando Ahuja     Diabetes Father Amando Ahuja     Hearing loss Father Amando Ahuja     Hyperlipidemia Father Amando Ahuja     Hypertension Father Amando Ahuja           Objective:     Physical Exam  Vitals and nursing note reviewed.   Constitutional:       Appearance: Normal appearance. He is normal weight.   HENT:      Head: Normocephalic.      Nose: Nose normal.      Mouth/Throat:      Mouth: Mucous membranes are moist.      Pharynx: Oropharynx is clear.   Eyes:      Extraocular Movements: Extraocular movements intact.   Cardiovascular:      Rate and Rhythm: Normal rate and regular rhythm.   Pulmonary:      Effort: Pulmonary effort is normal.      Breath sounds: Normal breath sounds.   Musculoskeletal:         General: Normal range of motion.   Skin:     General: Skin is warm and dry.   Neurological:      General: No focal deficit present.      Mental Status: He is alert and oriented to person, place, and time. Mental status is at baseline.   Psychiatric:         Mood and Affect: Mood normal.       Medications Ordered Prior to Encounter[1]  Health Maintenance   Topic Date Due    COVID-19 Vaccine (5 - 2024-25 season) 09/01/2024    Influenza Vaccine (1) 09/01/2025    Hemoglobin A1c (Diabetic Prevention Screening)  04/24/2028    Lipid Panel  04/24/2030    TETANUS VACCINE  02/02/2033    RSV Vaccine (Age 60+ and Pregnant patients) (1 - 1-dose 75+ series) 09/22/2063    Hepatitis C Screening  Completed    HIV Screening  Completed    Pneumococcal Vaccines (Age 0-49)  Completed      Results for orders placed or performed in visit on 07/24/25   Comprehensive Metabolic Panel    Collection Time: 07/24/25  7:23 AM   Result Value Ref Range    Sodium 140 136 - 145 mmol/L    Potassium 4.8 3.5 - 5.1 mmol/L    Chloride 104 98 - 107 mmol/L    CO2 30 (H) 22 - 29 mmol/L    Glucose 96  74 - 100 mg/dL    Blood Urea Nitrogen 23.5 (H) 8.9 - 20.6 mg/dL    Creatinine 1.23 0.72 - 1.25 mg/dL    Calcium 9.3 8.4 - 10.2 mg/dL    Protein Total 6.9 6.4 - 8.3 gm/dL    Albumin 3.6 3.5 - 5.0 g/dL    Globulin 3.3 2.4 - 3.5 gm/dL    Albumin/Globulin Ratio 1.1 1.1 - 2.0 ratio    Bilirubin Total 0.7 <=1.5 mg/dL    ALP 73 40 - 150 unit/L    ALT 39 0 - 55 unit/L    AST 51 (H) 11 - 45 unit/L    eGFR >60 mL/min/1.73/m2    Anion Gap 6.0 mEq/L    BUN/Creatinine Ratio 19    CBC with Differential    Collection Time: 07/24/25  7:23 AM   Result Value Ref Range    WBC 8.69 4.50 - 11.50 x10(3)/mcL    RBC 5.45 4.70 - 6.10 x10(6)/mcL    Hgb 15.7 14.0 - 18.0 g/dL    Hct 47.3 42.0 - 52.0 %    MCV 86.8 80.0 - 94.0 fL    MCH 28.8 27.0 - 31.0 pg    MCHC 33.2 33.0 - 36.0 g/dL    RDW 15.2 11.5 - 17.0 %    Platelet 370 130 - 400 x10(3)/mcL    MPV 10.1 7.4 - 10.4 fL    Neut % 51.0 %    Lymph % 28.9 %    Mono % 13.0 %    Eos % 5.6 %    Basophil % 1.2 %    Imm Grans % 0.3 %    Neut # 4.43 2.1 - 9.2 x10(3)/mcL    Lymph # 2.51 0.6 - 4.6 x10(3)/mcL    Mono # 1.13 0.1 - 1.3 x10(3)/mcL    Eos # 0.49 0 - 0.9 x10(3)/mcL    Baso # 0.10 <=0.2 x10(3)/mcL    Imm Gran # 0.03 0.00 - 0.04 x10(3)/mcL    NRBC% 0.0 %          Assessment & Plan:     Active Problem List with Overview Notes    Diagnosis Date Noted    Low testosterone 07/24/2025    Elevated WBC count 07/24/2025    GERD (gastroesophageal reflux disease)     Abnormal PFTs (pulmonary function tests) 01/03/2025    Shortness of breath 09/16/2024    Bilateral carpal tunnel syndrome 05/07/2024    Hypertension 05/02/2024    S/P splenectomy 05/02/2024    Carpal tunnel syndrome     Chronic hepatitis C without hepatic coma 07/10/2023    Nocturia 07/10/2023    Hernia of abdominal wall 07/10/2023    Screen for STD (sexually transmitted disease) 07/10/2023    Cigarette smoker 03/07/2023    Wellness examination 03/07/2023    Family history of diabetes mellitus 03/07/2023    Onychomycosis 03/07/2023    Anxiety      ADHD (attention deficit hyperactivity disorder)        1. Low testosterone  Assessment & Plan:  Low on labs from outside provider 2/2025. Will be following with specialist for supplementation.  Has not started meds yet.       2. Leukocytosis, unspecified type  Assessment & Plan:  Seen on labs from outside provider 2/2025. Denied any recent illness or vaccination.  Repeat labs today reviewed with patient today at visit. Cbc and diff is wnl.  Monitor.            Follow up for as scheduled or sooner prn.          [1]   Current Outpatient Medications on File Prior to Visit   Medication Sig Dispense Refill    albuterol (VENTOLIN HFA) 90 mcg/actuation inhaler Inhale 1-2 puffs into the lungs every 4 (four) hours as needed for Wheezing or Shortness of Breath. Rescue 18 g 11    amoxicillin (AMOXIL) 500 MG capsule Take 500 mg by mouth every 12 (twelve) hours.      busPIRone (BUSPAR) 5 MG Tab Take 5 mg by mouth 2 (two) times daily.      lisinopriL-hydrochlorothiazide (PRINZIDE,ZESTORETIC) 10-12.5 mg per tablet Take 1 tablet by mouth once daily. 90 tablet 3    pantoprazole (PROTONIX) 40 MG tablet Take 1 tablet (40 mg total) by mouth once daily. 30 tablet 11    tadalafiL (CIALIS) 20 MG Tab Take 20 mg by mouth daily as needed.      VYVANSE 50 mg capsule Take 50 mg by mouth every morning.       No current facility-administered medications on file prior to visit.

## (undated) DEVICE — APPLICATOR CHLORAPREP ORN 26ML

## (undated) DEVICE — ELECTRODE PATIENT RETURN DISP

## (undated) DEVICE — SUT 4-0 ETHILON 18 PS-2

## (undated) DEVICE — DRAPE HAND STERILE

## (undated) DEVICE — BANDAGE VELCLOSE ELAS 3INX5YD

## (undated) DEVICE — KIT DRAIN WOUND RND SPRNG RESV

## (undated) DEVICE — BANDAGE ESMARK LATEX FREE 4INX

## (undated) DEVICE — SUT MONOCRYL 3-0 PS-2 UND

## (undated) DEVICE — CORD BIPOLAR 12 FOOT

## (undated) DEVICE — BLADE SURG STAINLESS STEEL #15

## (undated) DEVICE — GLOVE SENSICARE PI GRN 6.5

## (undated) DEVICE — GLOVE PROTEXIS PI CRM 7

## (undated) DEVICE — GLOVE SENSICARE PI SURG 6.5

## (undated) DEVICE — SLING ARM LARGE FOAM STRAP

## (undated) DEVICE — GLOVE SENSICARE PI GRN 7.5

## (undated) DEVICE — SUT ETHILON 3-0 FS-1 30

## (undated) DEVICE — SUT ETHILON 3-0 PS2 18 BLK

## (undated) DEVICE — DRESSING XEROFORM NONADH 1X8IN

## (undated) DEVICE — KIT SURGICAL TURNOVER

## (undated) DEVICE — GOWN NONREINF SET-IN SLV XL

## (undated) DEVICE — CUFF ATS 2 PORT SNGL BLDR 18IN

## (undated) DEVICE — BANDAGE VELCLOSE ELAS 2INX5YD

## (undated) DEVICE — SOL NACL IRR 1000ML BTL

## (undated) DEVICE — BANDAGE ROLL COTTN 4.5INX4.1YD

## (undated) DEVICE — KIT ANTIFOG W/SPONG & FLUID

## (undated) DEVICE — SPONGE KERLIX SUPER 6X6.75IN

## (undated) DEVICE — Device